# Patient Record
Sex: MALE | Race: WHITE | NOT HISPANIC OR LATINO | Employment: OTHER | URBAN - METROPOLITAN AREA
[De-identification: names, ages, dates, MRNs, and addresses within clinical notes are randomized per-mention and may not be internally consistent; named-entity substitution may affect disease eponyms.]

---

## 2018-10-17 ENCOUNTER — TELEPHONE (OUTPATIENT)
Dept: UROLOGY | Facility: AMBULATORY SURGERY CENTER | Age: 73
End: 2018-10-17

## 2018-10-17 NOTE — TELEPHONE ENCOUNTER
Reason for appointment/Complaint/Diagnosis : elevated PSA  10 8    Insurance: [employer]     History of Cancer? No      Previous urologist?     Dr Austin Fried               Records requested/where? In [facility]     [facility]     Location Preference for office visit?  [place]    Patient stated that he is going to drop off  results at [facility]

## 2018-10-17 NOTE — TELEPHONE ENCOUNTER
Spoke with patient  Patient reports he had been seeing Dr Baca Patient for a number of years  PSA has ranged up to 7-8 over the years  Patient reports he has never had a prostate biopsy done  Patient reports current PSA is 10 87  Patient reports he has a number of PSA results he will bring to the appointment  Patient scheduled for 10/18/18 at 1:15 in the [facility] office with Dr Juan Jose Blackburn, Address and location confirmed with patient  Patient to arrive approx 15 minutes early to complete new patient paperwork

## 2018-10-18 ENCOUNTER — TELEPHONE (OUTPATIENT)
Dept: UROLOGY | Facility: CLINIC | Age: 73
End: 2018-10-18

## 2018-10-18 ENCOUNTER — OFFICE VISIT (OUTPATIENT)
Dept: UROLOGY | Facility: CLINIC | Age: 73
End: 2018-10-18
Payer: MEDICARE

## 2018-10-18 VITALS
HEART RATE: 62 BPM | WEIGHT: 142 LBS | SYSTOLIC BLOOD PRESSURE: 118 MMHG | HEIGHT: 68 IN | BODY MASS INDEX: 21.52 KG/M2 | DIASTOLIC BLOOD PRESSURE: 68 MMHG

## 2018-10-18 DIAGNOSIS — R97.20 ELEVATED PSA: Primary | ICD-10-CM

## 2018-10-18 PROCEDURE — 99204 OFFICE O/P NEW MOD 45 MIN: CPT | Performed by: UROLOGY

## 2018-10-18 RX ORDER — CIPROFLOXACIN 500 MG/1
500 TABLET, FILM COATED ORAL EVERY 12 HOURS SCHEDULED
Qty: 6 TABLET | Refills: 0 | Status: SHIPPED | OUTPATIENT
Start: 2018-10-18 | End: 2018-10-21

## 2018-10-18 RX ORDER — TADALAFIL 5 MG/1
TABLET ORAL EVERY 24 HOURS
COMMUNITY
End: 2018-10-18

## 2018-10-18 RX ORDER — SILDENAFIL CITRATE 20 MG/1
TABLET ORAL
Qty: 90 TABLET | Refills: 0 | Status: SHIPPED | OUTPATIENT
Start: 2018-10-18 | End: 2019-02-26 | Stop reason: ALTCHOICE

## 2018-10-18 RX ORDER — LORATADINE 10 MG/1
TABLET ORAL
COMMUNITY
Start: 2010-01-19 | End: 2019-05-31 | Stop reason: ALTCHOICE

## 2018-10-18 RX ORDER — SIMVASTATIN 20 MG
20 TABLET ORAL
COMMUNITY
End: 2019-09-12 | Stop reason: ALTCHOICE

## 2018-10-18 RX ORDER — MODAFINIL 200 MG/1
TABLET ORAL
COMMUNITY
Start: 2018-05-01 | End: 2019-05-31 | Stop reason: DRUGHIGH

## 2018-10-18 RX ORDER — BIOTIN 1 MG
TABLET ORAL EVERY 24 HOURS
COMMUNITY
Start: 2017-02-17 | End: 2019-02-26 | Stop reason: ALTCHOICE

## 2018-10-18 RX ORDER — ATORVASTATIN CALCIUM 20 MG/1
20 TABLET, FILM COATED ORAL
COMMUNITY
End: 2019-02-26 | Stop reason: ALTCHOICE

## 2018-10-18 NOTE — LETTER
October 18, 2018     [facility] [id number]    Patient: Isela Barton   YOB: 1945   Date of Visit: 10/18/2018       Dear Dr Di Dean:    Thank you for referring Usha Levine to me for evaluation  Below are my notes for this consultation  If you have questions, please do not hesitate to call me  I look forward to following your patient along with you  Sincerely,        Christi William MD        CC: No Recipients  Christi William MD  10/18/2018  1:58 PM  Sign at close encounter  10/18/2018    Isela Barton  1945  [id number]        Assessment  Elevated PSA with prostate nodule    Plan  I am concerned about his PSA elevation, trend, and his prostate examination  We discussed this in detail  He understands that there is reasonable risk for prostate cancer  I recommend prostate biopsy  We discussed the technique, risks, benefits of typical 12 core biopsy  I may also biopsy additional cores from the suspicious areas  He is amenable to this  He had questions about treatment options  We briefly discussed typical treatment options, however understanding that this premature without any pathology  He also asked about treatment for erectile dysfunction  He has been using Cialis 5 mg generic from [facility]  I recommended generic sildenafil for medisuite  He would like to try this, as the cost is much less  Prescription was sent  All questions answered to his satisfaction  History of Present Illness  Lobo Hall is a 75 y o  male who presents for evaluation of elevated PSA  He had been seeing Dr Td Stratton for several years  Initially he was seen for erectile dysfunction, however prostate screening revealed elevated PSA  In 2014 PSA was 7 1  In 2016 PSA was 8 5, and current PSA from the [place] is 10 87  Prior to this year, these had been observed without biopsy or other intervention      His only complaint is nocturia with double voiding at times overnight  AUA symptom score is 8  He is a [place], but served on a [employer], rather than on land  He did not have Agent Orange exposure  His father had bladder cancer and underwent radical cystectomy in his [de-identified], and lived to age 80  AUA SYMPTOM SCORE      Most Recent Value   AUA SYMPTOM SCORE   How often have you had a sensation of not emptying your bladder completely after you finished urinating? 3   How often have you had to urinate again less than two hours after you finished urinating? 2   How often have you found you stopped and started again several times when you urinate? 1   How often have you found it difficult to postpone urination? 0   How often have you had a weak urinary stream?  0   How often have you had to push or strain to begin urination? 0   How many times did you most typically get up to urinate from the time you went to bed at night until the time you got up in the morning? 2   Quality of Life: If you were to spend the rest of your life with your urinary condition just the way it is now, how would you feel about that?  1   AUA SYMPTOM SCORE  8          Review of Systems  Review of Systems   Constitutional: Negative  HENT: Negative  Respiratory: Negative  Cardiovascular: Negative  Gastrointestinal: Negative  Genitourinary:        As per HPI   Musculoskeletal: Negative  Skin: Negative  Neurological: Negative  Hematological: Negative            Past Medical History  Past Medical History:   Diagnosis Date    Cancer ([facility] [address] )     skin    Elevated PSA     Narcolepsy     Pacemaker 1988       Past Social History  Past Surgical History:   Procedure Laterality Date    ATRIAL CARDIAC PACEMAKER INSERTION      CARDIAC CATHETERIZATION  02/12/2015    SKIN CANCER EXCISION      left cheek       Past Family History  Family History   Problem Relation Age of Onset    Coronary artery disease Mother     Coronary artery disease Father        Past Social history  Social History     Social History    Marital status: /Civil Union     Spouse name: N/A    Number of children: N/A    Years of education: N/A     Occupational History    Not on file  Social History Main Topics    Smoking status: Never Smoker    Smokeless tobacco: Never Used      Comment: No passive smoke exposure    Alcohol use Yes      Comment: 1 glass beer/wine daily    Drug use: No    Sexual activity: Not on file     Other Topics Concern    Not on file     Social History Narrative    No narrative on file     History   Smoking Status    Never Smoker   Smokeless Tobacco    Never Used     Comment: No passive smoke exposure       Current Medications  Current Outpatient Prescriptions   Medication Sig Dispense Refill    atorvastatin (LIPITOR) 20 mg tablet Take 20 mg by mouth      Cholecalciferol (VITAMIN D3) 1000 units CAPS every 24 hours      loratadine (CLARITIN) 10 mg tablet       modafinil (PROVIGIL) 200 MG tablet TAKE 1 TABLET BY MOUTH  TWICE A DAY      simvastatin (ZOCOR) 20 mg tablet Take 20 mg by mouth      ciprofloxacin (CIPRO) 500 mg tablet Take 1 tablet (500 mg total) by mouth every 12 (twelve) hours for 3 days Start day prior to procedure 6 tablet 0    sildenafil (REVATIO) 20 mg tablet Take 3 to 5 tablets as directed  Use no more than once daily  90 tablet 0     No current facility-administered medications for this visit  Allergies  No Known Allergies    Past Medical History, Social History, Family History, medications and allergies were reviewed  Vitals  Vitals:    10/18/18 1315   BP: 118/68   BP Location: Left arm   Patient Position: Sitting   Cuff Size: Standard   Pulse: 62   Weight: 64 4 kg (142 lb)   Height: 5' 7 5" (1 715 m)       Physical Exam  Physical Exam   Constitutional: He is oriented to person, place, and time  He appears well-developed and well-nourished  Cardiovascular: Normal rate  Pulmonary/Chest: Effort normal    Abdominal: Soft  Genitourinary:   Genitourinary Comments: Prostate about 35 g, nodule palpable at the right base, additional left-sided nodule left lateral as well  Musculoskeletal: Normal range of motion  Neurological: He is alert and oriented to person, place, and time  Skin: Skin is warm, dry and intact  Psychiatric: He has a normal mood and affect  Vitals reviewed          Results  No results found for: PSA  No results found for: GLUCOSE, CALCIUM, NA, K, CO2, CL, BUN, CREATININE  No results found for: WBC, HGB, HCT, MCV, PLT

## 2018-10-18 NOTE — TELEPHONE ENCOUNTER
Patient managed by Dr Reji Earl  Seen in [facility] office  Patient seen today by Dr Reji Earl, who advised prostate biopsy  Patient called and left message to schedule biopsy  Attempted to return phone call to schedule, left message for patient to return call to schedule

## 2018-10-18 NOTE — PROGRESS NOTES
10/18/2018    Dolly Hendricks  1945  [id number]        Assessment  Elevated PSA with prostate nodule    Plan  I am concerned about his PSA elevation, trend, and his prostate examination  We discussed this in detail  He understands that there is reasonable risk for prostate cancer  I recommend prostate biopsy  We discussed the technique, risks, benefits of typical 12 core biopsy  I may also biopsy additional cores from the suspicious areas  He is amenable to this  He had questions about treatment options  We briefly discussed typical treatment options, however understanding that this premature without any pathology  He also asked about treatment for erectile dysfunction  He has been using Cialis 5 mg generic from [facility]  I recommended generic sildenafil for medisuite  He would like to try this, as the cost is much less  Prescription was sent  All questions answered to his satisfaction  History of Present Illness  Della Daley is a 75 y o  male who presents for evaluation of elevated PSA  He had been seeing Dr Agusto Lake for several years  Initially he was seen for erectile dysfunction, however prostate screening revealed elevated PSA  In 2014 PSA was 7 1  In 2016 PSA was 8 5, and current PSA from the [facility] is 10 87  Prior to this year, these had been observed without biopsy or other intervention  His only complaint is nocturia with double voiding at times overnight  AUA symptom score is 8  He is a [place], but served on a [employer], rather than on land  He did not have Agent Orange exposure  His father had bladder cancer and underwent radical cystectomy in his [de-identified], and lived to age 80  AUA SYMPTOM SCORE      Most Recent Value   AUA SYMPTOM SCORE   How often have you had a sensation of not emptying your bladder completely after you finished urinating? 3   How often have you had to urinate again less than two hours after you finished urinating?   2   How often have you found you stopped and started again several times when you urinate? 1   How often have you found it difficult to postpone urination? 0   How often have you had a weak urinary stream?  0   How often have you had to push or strain to begin urination? 0   How many times did you most typically get up to urinate from the time you went to bed at night until the time you got up in the morning? 2   Quality of Life: If you were to spend the rest of your life with your urinary condition just the way it is now, how would you feel about that?  1   AUA SYMPTOM SCORE  8          Review of Systems  Review of Systems   Constitutional: Negative  HENT: Negative  Respiratory: Negative  Cardiovascular: Negative  Gastrointestinal: Negative  Genitourinary:        As per HPI   Musculoskeletal: Negative  Skin: Negative  Neurological: Negative  Hematological: Negative  Past Medical History  Past Medical History:   Diagnosis Date    Cancer ([facility] [address] )     skin    Elevated PSA     Narcolepsy     Pacemaker 1988       Past Social History  Past Surgical History:   Procedure Laterality Date    ATRIAL CARDIAC PACEMAKER INSERTION      CARDIAC CATHETERIZATION  02/12/2015    SKIN CANCER EXCISION      left cheek       Past Family History  Family History   Problem Relation Age of Onset    Coronary artery disease Mother     Coronary artery disease Father        Past Social history  Social History     Social History    Marital status: /Civil Union     Spouse name: N/A    Number of children: N/A    Years of education: N/A     Occupational History    Not on file       Social History Main Topics    Smoking status: Never Smoker    Smokeless tobacco: Never Used      Comment: No passive smoke exposure    Alcohol use Yes      Comment: 1 glass beer/wine daily    Drug use: No    Sexual activity: Not on file     Other Topics Concern    Not on file     Social History Narrative    No narrative on file History   Smoking Status    Never Smoker   Smokeless Tobacco    Never Used     Comment: No passive smoke exposure       Current Medications  Current Outpatient Prescriptions   Medication Sig Dispense Refill    atorvastatin (LIPITOR) 20 mg tablet Take 20 mg by mouth      Cholecalciferol (VITAMIN D3) 1000 units CAPS every 24 hours      loratadine (CLARITIN) 10 mg tablet       modafinil (PROVIGIL) 200 MG tablet TAKE 1 TABLET BY MOUTH  TWICE A DAY      simvastatin (ZOCOR) 20 mg tablet Take 20 mg by mouth      ciprofloxacin (CIPRO) 500 mg tablet Take 1 tablet (500 mg total) by mouth every 12 (twelve) hours for 3 days Start day prior to procedure 6 tablet 0    sildenafil (REVATIO) 20 mg tablet Take 3 to 5 tablets as directed  Use no more than once daily  90 tablet 0     No current facility-administered medications for this visit  Allergies  No Known Allergies    Past Medical History, Social History, Family History, medications and allergies were reviewed  Vitals  Vitals:    10/18/18 1315   BP: 118/68   BP Location: Left arm   Patient Position: Sitting   Cuff Size: Standard   Pulse: 62   Weight: 64 4 kg (142 lb)   Height: 5' 7 5" (1 715 m)       Physical Exam  Physical Exam   Constitutional: He is oriented to person, place, and time  He appears well-developed and well-nourished  Cardiovascular: Normal rate  Pulmonary/Chest: Effort normal    Abdominal: Soft  Genitourinary:   Genitourinary Comments: Prostate about 35 g, nodule palpable at the right base, additional left-sided nodule left lateral as well  Musculoskeletal: Normal range of motion  Neurological: He is alert and oriented to person, place, and time  Skin: Skin is warm, dry and intact  Psychiatric: He has a normal mood and affect  Vitals reviewed          Results  No results found for: PSA  No results found for: GLUCOSE, CALCIUM, NA, K, CO2, CL, BUN, CREATININE  No results found for: WBC, HGB, HCT, MCV, PLT

## 2018-10-19 NOTE — TELEPHONE ENCOUNTER
Pt called office to schedule biopsy, pt scheduled 11/5/18 in [place] for biopsy, and 11/21/18 in [place] for results  Pt aware both appts in [place], mailed appt cards directions and dirrections

## 2018-11-05 ENCOUNTER — PROCEDURE VISIT (OUTPATIENT)
Dept: UROLOGY | Facility: AMBULATORY SURGERY CENTER | Age: 73
End: 2018-11-05
Payer: MEDICARE

## 2018-11-05 VITALS
WEIGHT: 147.4 LBS | DIASTOLIC BLOOD PRESSURE: 78 MMHG | HEIGHT: 68 IN | SYSTOLIC BLOOD PRESSURE: 118 MMHG | BODY MASS INDEX: 22.34 KG/M2

## 2018-11-05 DIAGNOSIS — R97.20 ELEVATED PSA: Primary | ICD-10-CM

## 2018-11-05 PROCEDURE — 76872 US TRANSRECTAL: CPT | Performed by: UROLOGY

## 2018-11-05 PROCEDURE — G0416 PROSTATE BIOPSY, ANY MTHD: HCPCS | Performed by: PATHOLOGY

## 2018-11-05 PROCEDURE — 55700 PR BIOPSY OF PROSTATE,NEEDLE/PUNCH: CPT | Performed by: UROLOGY

## 2018-11-05 PROCEDURE — 76942 ECHO GUIDE FOR BIOPSY: CPT | Performed by: UROLOGY

## 2018-11-05 RX ORDER — TADALAFIL 5 MG/1
TABLET ORAL EVERY 24 HOURS
COMMUNITY
End: 2018-11-05 | Stop reason: CLARIF

## 2018-11-05 NOTE — PROGRESS NOTES
Prostate Biopsy note: The patient returns to the office today to undergo a transrectal ultrasound-guided biopsy of the prostate secondary to an elevated PSA  Risk and benefits of the procedure were discussed  Informed consent was obtained  The patient's prebiopsy preparation was deemed to be adequate  Antibiotics had been taken as prescribed  If appropriate blood thinners had been placed on hold  The patient completed an enema as prescribed  The patient was placed in the lateral decubitus position  Digital rectal examination was performed revealing a 30 gram prostate  Palpable nodule at the right base and left lateral portions  Viscous lidocaine jelly was instilled into the rectum  Transrectal ultrasonography was then performed  The prostate measured 25 grams  Hypoechoic area at the right base  5 cc of 2% lidocaine were then injected bilaterally between the junction of the base of the prostate and the seminal vesicles  Ultrasound guidance was utilized to place the needle into proper position for the administration of the local anesthetic  A standard 12 core biopsy was then performed  4 cores were taken from the right peripheral zone  2 cores were taken from the right central zone  4 cores were taken from the left peripheral zone  2 cores were taken from the left central zone  Overall the patient tolerated the procedure and there were no complications

## 2018-11-21 ENCOUNTER — OFFICE VISIT (OUTPATIENT)
Dept: UROLOGY | Facility: AMBULATORY SURGERY CENTER | Age: 73
End: 2018-11-21
Payer: MEDICARE

## 2018-11-21 VITALS
SYSTOLIC BLOOD PRESSURE: 116 MMHG | WEIGHT: 148 LBS | HEART RATE: 62 BPM | BODY MASS INDEX: 22.43 KG/M2 | HEIGHT: 68 IN | DIASTOLIC BLOOD PRESSURE: 64 MMHG

## 2018-11-21 DIAGNOSIS — C61 PROSTATE CANCER (HCC): Primary | ICD-10-CM

## 2018-11-21 PROCEDURE — 99215 OFFICE O/P EST HI 40 MIN: CPT | Performed by: UROLOGY

## 2018-11-21 NOTE — PROGRESS NOTES
11/21/2018    [facility]  1945  [id number]        Assessment  High volume [place] 6 prostate cancer    Plan  We had an extended discussion regarding the grading and staging of prostate cancer in detail  We discussed his particular situation with Hi 6 prostate cancer likely for several years, with extensive bilateral invasion  Nevertheless, we discussed that it is unlikely that Hi 6 prostate cancer will metastasize  We spent some time discussing options at this point  Explained that typically [place] prostate cancer is observed with active surveillance protocol and not treated  However, due to the degree of cancer identified, he certainly has the option of treatment  We discussed surgical treatment as well as radiation therapy  We both agree that surgery is not appropriate for him  However he is extremely healthy and fit, and may wish to pursue radiation therapy  He will review literature given to him and also online literature  Will also speak to family members or knowledgeable about this  He may wish to pursue Radiation Oncology consultation, but is not ready for this yet  For now will plan on PSA in 3 months with follow-up  If he changes his mind we may refer him for radiation oncology consultation  Otherwise will plan for now on active surveillance with confirmatory biopsy in 1 year or sooner depending on PSA  All questions answered to their satisfaction  Total visit time was 40 minutes of which over 50% was spent on counseling  History of Present Illness  Norah Benítez is a 75 y o  male returning for prostate biopsy results  He has no complaints regarding his prostate biopsy  This was tolerated well  Review of Systems  Review of Systems   Constitutional: Negative  HENT: Negative  Respiratory: Negative  Cardiovascular: Negative  Gastrointestinal: Negative  Genitourinary:        As per HPI   Musculoskeletal: Negative  Skin: Negative  Neurological: Negative  Hematological: Negative  Past Medical History  Past Medical History:   Diagnosis Date    Cancer ([address] )     skin    Elevated PSA     Narcolepsy     Pacemaker 1988       Past Social History  Past Surgical History:   Procedure Laterality Date    ATRIAL CARDIAC PACEMAKER INSERTION      CARDIAC CATHETERIZATION  02/12/2015    SKIN CANCER EXCISION      left cheek       Past Family History  Family History   Problem Relation Age of Onset    Coronary artery disease Mother     Coronary artery disease Father        Past Social history  Social History     Social History    Marital status: /Civil Union     Spouse name: N/A    Number of children: N/A    Years of education: N/A     Occupational History    Not on file  Social History Main Topics    Smoking status: Never Smoker    Smokeless tobacco: Never Used      Comment: No passive smoke exposure    Alcohol use Yes      Comment: 1 glass beer/wine daily    Drug use: No    Sexual activity: Not on file     Other Topics Concern    Not on file     Social History Narrative    No narrative on file     History   Smoking Status    Never Smoker   Smokeless Tobacco    Never Used     Comment: No passive smoke exposure       Current Medications  Current Outpatient Prescriptions   Medication Sig Dispense Refill    atorvastatin (LIPITOR) 20 mg tablet Take 20 mg by mouth      Cholecalciferol (VITAMIN D3) 1000 units CAPS every 24 hours      loratadine (CLARITIN) 10 mg tablet       modafinil (PROVIGIL) 200 MG tablet TAKE 1 TABLET BY MOUTH  TWICE A DAY      sildenafil (REVATIO) 20 mg tablet Take 3 to 5 tablets as directed  Use no more than once daily  90 tablet 0    simvastatin (ZOCOR) 20 mg tablet Take 20 mg by mouth       No current facility-administered medications for this visit          Allergies  No Known Allergies    Past Medical History, Social History, Family History, medications and allergies were reviewed  Vitals  Vitals:    11/21/18 1550   BP: 116/64   BP Location: Left arm   Patient Position: Sitting   Cuff Size: Adult   Pulse: 62   Weight: 67 1 kg (148 lb)   Height: 5' 7 5" (1 715 m)       Physical Exam  Physical Exam   Constitutional: He is oriented to person, place, and time  He appears well-developed and well-nourished  Cardiovascular: Normal rate  Pulmonary/Chest: Effort normal    Abdominal: Soft  Musculoskeletal: Normal range of motion  Neurological: He is alert and oriented to person, place, and time  Skin: Skin is warm, dry and intact  Psychiatric: He has a normal mood and affect  Vitals reviewed          Results  No results found for: PSA  No results found for: GLUCOSE, CALCIUM, NA, K, CO2, CL, BUN, CREATININE  No results found for: WBC, HGB, HCT, MCV, PLT

## 2018-11-21 NOTE — LETTER
November 21, 2018     [facility] [id number]    Patient: Valentino Fordyce   YOB: 1945   Date of Visit: 11/21/2018       Dear Dr Banegas Post:    Thank you for referring Ledale Dougherty to me for evaluation  Below are my notes for this consultation  If you have questions, please do not hesitate to call me  I look forward to following your patient along with you  Sincerely,        Aisha Gil MD        CC: No Recipients  Aisha Gil MD  11/21/2018  4:25 PM  Sign at close encounter  11/21/2018    Valentino Fordyce  1945  [id number]        Assessment  High volume [place] 6 prostate cancer    Plan  We had an extended discussion regarding the grading and staging of prostate cancer in detail  We discussed his particular situation with Hi 6 prostate cancer likely for several years, with extensive bilateral invasion  Nevertheless, we discussed that it is unlikely that [place] 6 prostate cancer will metastasize  We spent some time discussing options at this point  Explained that typically Hi 6 prostate cancer is observed with active surveillance protocol and not treated  However, due to the degree of cancer identified, he certainly has the option of treatment  We discussed surgical treatment as well as radiation therapy  We both agree that surgery is not appropriate for him  However he is extremely healthy and fit, and may wish to pursue radiation therapy  He will review literature given to him and also online literature  Will also speak to family members or knowledgeable about this  He may wish to pursue Radiation Oncology consultation, but is not ready for this yet  For now will plan on PSA in 3 months with follow-up  If he changes his mind we may refer him for radiation oncology consultation  Otherwise will plan for now on active surveillance with confirmatory biopsy in 1 year or sooner depending on PSA    All questions answered to their satisfaction  Total visit time was 40 minutes of which over 50% was spent on counseling  History of Present Illness  Linda Cole is a 75 y o  male returning for prostate biopsy results  He has no complaints regarding his prostate biopsy  This was tolerated well  Review of Systems  Review of Systems   Constitutional: Negative  HENT: Negative  Respiratory: Negative  Cardiovascular: Negative  Gastrointestinal: Negative  Genitourinary:        As per HPI   Musculoskeletal: Negative  Skin: Negative  Neurological: Negative  Hematological: Negative  Past Medical History  Past Medical History:   Diagnosis Date    Cancer ([facility] [address] )     skin    Elevated PSA     Narcolepsy     Pacemaker 1988       Past Social History  Past Surgical History:   Procedure Laterality Date    ATRIAL CARDIAC PACEMAKER INSERTION      CARDIAC CATHETERIZATION  02/12/2015    SKIN CANCER EXCISION      left cheek       Past Family History  Family History   Problem Relation Age of Onset    Coronary artery disease Mother     Coronary artery disease Father        Past Social history  Social History     Social History    Marital status: /Civil Union     Spouse name: N/A    Number of children: N/A    Years of education: N/A     Occupational History    Not on file       Social History Main Topics    Smoking status: Never Smoker    Smokeless tobacco: Never Used      Comment: No passive smoke exposure    Alcohol use Yes      Comment: 1 glass beer/wine daily    Drug use: No    Sexual activity: Not on file     Other Topics Concern    Not on file     Social History Narrative    No narrative on file     History   Smoking Status    Never Smoker   Smokeless Tobacco    Never Used     Comment: No passive smoke exposure       Current Medications  Current Outpatient Prescriptions   Medication Sig Dispense Refill    atorvastatin (LIPITOR) 20 mg tablet Take 20 mg by mouth      Cholecalciferol (VITAMIN D3) 1000 units CAPS every 24 hours      loratadine (CLARITIN) 10 mg tablet       modafinil (PROVIGIL) 200 MG tablet TAKE 1 TABLET BY MOUTH  TWICE A DAY      sildenafil (REVATIO) 20 mg tablet Take 3 to 5 tablets as directed  Use no more than once daily  90 tablet 0    simvastatin (ZOCOR) 20 mg tablet Take 20 mg by mouth       No current facility-administered medications for this visit  Allergies  No Known Allergies    Past Medical History, Social History, Family History, medications and allergies were reviewed  Vitals  Vitals:    11/21/18 1550   BP: 116/64   BP Location: Left arm   Patient Position: Sitting   Cuff Size: Adult   Pulse: 62   Weight: 67 1 kg (148 lb)   Height: 5' 7 5" (1 715 m)       Physical Exam  Physical Exam   Constitutional: He is oriented to person, place, and time  He appears well-developed and well-nourished  Cardiovascular: Normal rate  Pulmonary/Chest: Effort normal    Abdominal: Soft  Musculoskeletal: Normal range of motion  Neurological: He is alert and oriented to person, place, and time  Skin: Skin is warm, dry and intact  Psychiatric: He has a normal mood and affect  Vitals reviewed          Results  No results found for: PSA  No results found for: GLUCOSE, CALCIUM, NA, K, CO2, CL, BUN, CREATININE  No results found for: WBC, HGB, HCT, MCV, PLT

## 2019-01-30 ENCOUNTER — TELEPHONE (OUTPATIENT)
Dept: CARDIOLOGY CLINIC | Facility: CLINIC | Age: 74
End: 2019-01-30

## 2019-01-30 NOTE — TELEPHONE ENCOUNTER
RECORDS CAME TO [facility] OFFICE ON 1/30/19 FOR AN UPCOMING  APPT BUT IT IS SCHEDULED WITH DR GARCIA AT [facility] OFFICE, SO I SENT THEM INTEROFFICE MAIL TO [place] ON 1/30/19 AT 11:55 PM   Deepak ACKERMAN MA, [facility]

## 2019-02-26 ENCOUNTER — OFFICE VISIT (OUTPATIENT)
Dept: CARDIOLOGY CLINIC | Facility: CLINIC | Age: 74
End: 2019-02-26
Payer: MEDICARE

## 2019-02-26 VITALS
SYSTOLIC BLOOD PRESSURE: 140 MMHG | WEIGHT: 151.3 LBS | HEART RATE: 47 BPM | HEIGHT: 68 IN | BODY MASS INDEX: 22.93 KG/M2 | DIASTOLIC BLOOD PRESSURE: 70 MMHG

## 2019-02-26 DIAGNOSIS — E78.00 PURE HYPERCHOLESTEROLEMIA: ICD-10-CM

## 2019-02-26 DIAGNOSIS — Z95.0 PACEMAKER: Primary | ICD-10-CM

## 2019-02-26 PROCEDURE — 99204 OFFICE O/P NEW MOD 45 MIN: CPT | Performed by: INTERNAL MEDICINE

## 2019-02-26 RX ORDER — TADALAFIL 5 MG/1
5 TABLET ORAL DAILY PRN
COMMUNITY
End: 2019-06-11 | Stop reason: DRUGHIGH

## 2019-03-01 ENCOUNTER — HOSPITAL ENCOUNTER (OUTPATIENT)
Dept: RADIOLOGY | Facility: HOSPITAL | Age: 74
Discharge: HOME | End: 2019-03-01
Attending: INTERNAL MEDICINE
Payer: MEDICARE

## 2019-03-01 DIAGNOSIS — Z95.0 PACEMAKER: ICD-10-CM

## 2019-03-01 PROCEDURE — 71046 X-RAY EXAM CHEST 2 VIEWS: CPT

## 2019-03-06 ENCOUNTER — TELEPHONE (OUTPATIENT)
Dept: CARDIOLOGY CLINIC | Facility: CLINIC | Age: 74
End: 2019-03-06

## 2019-03-06 ENCOUNTER — TELEPHONE (OUTPATIENT)
Dept: UROLOGY | Facility: AMBULATORY SURGERY CENTER | Age: 74
End: 2019-03-06

## 2019-03-06 NOTE — TELEPHONE ENCOUNTER
Patient received message to r/s his appointment with Dr Espinoza Sees he would like [address] [facility]ure[facility] Ontiveros or Gopi as soon as possible can't do 03/07

## 2019-03-06 NOTE — TELEPHONE ENCOUNTER
P/C from radiology about CXR results from 3/1/2019, ordered by DT     "CHEST      INDICATION:   Z95 0: Presence of cardiac pacemaker      COMPARISON:  None     EXAM PERFORMED/VIEWS:  XR CHEST PA & LATERAL  Images: 5     FINDINGS:  Right-sided multilead cardiac pacer  One of the leads exhibits 2 regions of apparent proximal segmental interruption      Cardiomediastinal silhouette appears unremarkable      The lungs are clear    No pneumothorax or pleural effusion      Osseous structures appear within normal limits for patient age      IMPRESSION:  Cardiac pacer with suspected lead interruptions     No acute cardiopulmonary disease "     I spoke with Andree Mcguire in device clinic-no problems at his device check at [address] [place] [address] with DT on 2/26/19; CXR done 3/1/19

## 2019-03-07 ENCOUNTER — OFFICE VISIT (OUTPATIENT)
Dept: UROLOGY | Facility: CLINIC | Age: 74
End: 2019-03-07
Payer: MEDICARE

## 2019-03-07 VITALS
SYSTOLIC BLOOD PRESSURE: 124 MMHG | HEART RATE: 65 BPM | HEIGHT: 68 IN | BODY MASS INDEX: 22.97 KG/M2 | WEIGHT: 151.6 LBS | DIASTOLIC BLOOD PRESSURE: 62 MMHG

## 2019-03-07 DIAGNOSIS — C61 PROSTATE CANCER (HCC): Primary | ICD-10-CM

## 2019-03-07 PROCEDURE — 99213 OFFICE O/P EST LOW 20 MIN: CPT | Performed by: UROLOGY

## 2019-03-07 NOTE — PROGRESS NOTES
3/7/2019    Kristina Lewis  1945  [id number]        Assessment  High volume [place] 6 prostate cancer    Plan  Once again, we discussed continued active surveillance versus treatment with radiation therapy  He is comfortable with surveillance as he is not anxious at this time  Agrees with another PSA and examination in 3 months and confirmatory biopsy in 6 months around September  Discussed potential risks, benefits, complications of radiation therapy if he chooses  All questions answered to satisfaction  History of Present Illness  Candace Hoffmann is a 75 y o  male  of [facility] however no Agent [place] exposure  He was found to have prostate cancer, [place] 6 based on PSA of 10 87 and examination indicating bilateral nodules  We have decided to proceed with active surveillance, however he has still been considering radiation therapy  No current complaints  PSA 10 5        AUA SYMPTOM SCORE      Most Recent Value   AUA SYMPTOM SCORE   How often have you had a sensation of not emptying your bladder completely after you finished urinating? 5   How often have you had to urinate again less than two hours after you finished urinating? 1   How often have you found you stopped and started again several times when you urinate? 1   How often have you found it difficult to postpone urination? 0   How often have you had a weak urinary stream?  1   How often have you had to push or strain to begin urination? 0   How many times did you most typically get up to urinate from the time you went to bed at night until the time you got up in the morning? 2   Quality of Life: If you were to spend the rest of your life with your urinary condition just the way it is now, how would you feel about that?  1   AUA SYMPTOM SCORE  10          Review of Systems  Review of Systems   Constitutional: Negative  HENT: Negative  Respiratory: Negative  Cardiovascular: Negative  Gastrointestinal: Negative  Genitourinary:        As per HPI   Musculoskeletal: Negative  Skin: Negative  Neurological: Negative  Hematological: Negative            Past Medical History  Past Medical History:   Diagnosis Date    Cancer ([facility] [address] )     skin    Elevated PSA     Narcolepsy     Pacemaker 1988       Past Social History  Past Surgical History:   Procedure Laterality Date    ATRIAL CARDIAC PACEMAKER INSERTION      CARDIAC CATHETERIZATION  02/12/2015    SKIN CANCER EXCISION      left cheek       Past Family History  Family History   Problem Relation Age of Onset    Coronary artery disease Mother     Coronary artery disease Father        Past Social history  Social History     Socioeconomic History    Marital status: /Civil Union     Spouse name: Not on file    Number of children: Not on file    Years of education: Not on file    Highest education level: Not on file   Occupational History    Not on file   Social Needs    Financial resource strain: Not on file    Food insecurity:     Worry: Not on file     Inability: Not on file    Transportation needs:     Medical: Not on file     Non-medical: Not on file   Tobacco Use    Smoking status: Never Smoker    Smokeless tobacco: Never Used    Tobacco comment: No passive smoke exposure   Substance and Sexual Activity    Alcohol use: Yes     Comment: 1 glass beer/wine daily    Drug use: No    Sexual activity: Not on file   Lifestyle    Physical activity:     Days per week: Not on file     Minutes per session: Not on file    Stress: Not on file   Relationships    Social connections:     Talks on phone: Not on file     Gets together: Not on file     Attends Restoration service: Not on file     Active member of club or organization: Not on file     Attends meetings of clubs or organizations: Not on file     Relationship status: Not on file    Intimate partner violence:     Fear of current or ex partner: Not on file     Emotionally abused: Not on file Physically abused: Not on file     Forced sexual activity: Not on file   Other Topics Concern    Not on file   Social History Narrative    Not on file     Social History     Tobacco Use   Smoking Status Never Smoker   Smokeless Tobacco Never Used   Tobacco Comment    No passive smoke exposure       Current Medications  Current Outpatient Medications   Medication Sig Dispense Refill    loratadine (CLARITIN) 10 mg tablet       modafinil (PROVIGIL) 200 MG tablet TAKE 1 TABLET BY MOUTH  TWICE A DAY      simvastatin (ZOCOR) 20 mg tablet Take 20 mg by mouth      tadalafil (CIALIS) 5 MG tablet Take 5 mg by mouth daily as needed for erectile dysfunction       No current facility-administered medications for this visit  Allergies  No Known Allergies    Past Medical History, Social History, Family History, medications and allergies were reviewed  Vitals  Vitals:    03/07/19 1432   BP: 124/62   BP Location: Left arm   Patient Position: Sitting   Cuff Size: Adult   Pulse: 65   Weight: 68 8 kg (151 lb 9 6 oz)   Height: 5' 7 5" (1 715 m)       Physical Exam  Physical Exam   Constitutional: He is oriented to person, place, and time  He appears well-developed and well-nourished  Cardiovascular: Normal rate  Pulmonary/Chest: Effort normal    Abdominal: Soft  Genitourinary:   Genitourinary Comments: Prostate about 30 g, nodule at the right base as well as left  Musculoskeletal: Normal range of motion  Neurological: He is alert and oriented to person, place, and time  Skin: Skin is warm, dry and intact  Psychiatric: He has a normal mood and affect  Vitals reviewed          Results  No results found for: PSA  No results found for: GLUCOSE, CALCIUM, NA, K, CO2, CL, BUN, CREATININE  No results found for: WBC, HGB, HCT, MCV, PLT

## 2019-04-25 ENCOUNTER — TELEPHONE (OUTPATIENT)
Dept: UROLOGY | Facility: MEDICAL CENTER | Age: 74
End: 2019-04-25

## 2019-04-25 DIAGNOSIS — N50.82 SCROTAL PAIN: Primary | ICD-10-CM

## 2019-04-29 ENCOUNTER — HOSPITAL ENCOUNTER (OUTPATIENT)
Dept: ULTRASOUND IMAGING | Facility: HOSPITAL | Age: 74
Discharge: HOME | End: 2019-04-29
Payer: MEDICARE

## 2019-04-29 DIAGNOSIS — N50.82 SCROTAL PAIN: ICD-10-CM

## 2019-04-29 PROCEDURE — 76870 US EXAM SCROTUM: CPT

## 2019-05-02 ENCOUNTER — OFFICE VISIT (OUTPATIENT)
Dept: UROLOGY | Facility: CLINIC | Age: 74
End: 2019-05-02
Payer: MEDICARE

## 2019-05-02 VITALS
HEART RATE: 64 BPM | DIASTOLIC BLOOD PRESSURE: 60 MMHG | WEIGHT: 149 LBS | BODY MASS INDEX: 22.58 KG/M2 | SYSTOLIC BLOOD PRESSURE: 118 MMHG | HEIGHT: 68 IN

## 2019-05-02 DIAGNOSIS — C61 PROSTATE CANCER (HCC): Primary | ICD-10-CM

## 2019-05-02 PROCEDURE — 99214 OFFICE O/P EST MOD 30 MIN: CPT | Performed by: UROLOGY

## 2019-05-02 RX ORDER — CIPROFLOXACIN 500 MG/1
500 TABLET, FILM COATED ORAL EVERY 12 HOURS SCHEDULED
Qty: 6 TABLET | Refills: 0 | Status: SHIPPED | OUTPATIENT
Start: 2019-05-02 | End: 2019-05-05

## 2019-05-03 PROCEDURE — 93000 ELECTROCARDIOGRAM COMPLETE: CPT | Performed by: INTERNAL MEDICINE

## 2019-05-20 ENCOUNTER — LAB (OUTPATIENT)
Dept: LAB | Facility: HOSPITAL | Age: 74
End: 2019-05-20
Attending: INTERNAL MEDICINE
Payer: MEDICARE

## 2019-05-20 ENCOUNTER — TRANSCRIBE ORDERS (OUTPATIENT)
Dept: LAB | Facility: HOSPITAL | Age: 74
End: 2019-05-20

## 2019-05-20 DIAGNOSIS — E78.00 PURE HYPERCHOLESTEROLEMIA: Primary | ICD-10-CM

## 2019-05-20 DIAGNOSIS — E78.00 PURE HYPERCHOLESTEROLEMIA: ICD-10-CM

## 2019-05-20 LAB
ALBUMIN SERPL BCP-MCNC: 3.7 G/DL (ref 3.5–5)
ALP SERPL-CCNC: 61 U/L (ref 46–116)
ALT SERPL W P-5'-P-CCNC: 27 U/L (ref 12–78)
ANION GAP SERPL CALCULATED.3IONS-SCNC: 5 MMOL/L (ref 4–13)
AST SERPL W P-5'-P-CCNC: 29 U/L (ref 5–45)
BASOPHILS # BLD AUTO: 0.05 THOUSANDS/ΜL (ref 0–0.1)
BASOPHILS NFR BLD AUTO: 1 % (ref 0–1)
BILIRUB DIRECT SERPL-MCNC: 0.12 MG/DL (ref 0–0.2)
BILIRUB SERPL-MCNC: 0.41 MG/DL (ref 0.2–1)
BUN SERPL-MCNC: 15 MG/DL (ref 5–25)
CALCIUM SERPL-MCNC: 9 MG/DL (ref 8.3–10.1)
CHLORIDE SERPL-SCNC: 105 MMOL/L (ref 100–108)
CHOLEST SERPL-MCNC: 191 MG/DL (ref 50–200)
CO2 SERPL-SCNC: 30 MMOL/L (ref 21–32)
CREAT SERPL-MCNC: 0.9 MG/DL (ref 0.6–1.3)
EOSINOPHIL # BLD AUTO: 0.2 THOUSAND/ΜL (ref 0–0.61)
EOSINOPHIL NFR BLD AUTO: 4 % (ref 0–6)
ERYTHROCYTE [DISTWIDTH] IN BLOOD BY AUTOMATED COUNT: 12.4 % (ref 11.6–15.1)
GFR SERPL CREATININE-BSD FRML MDRD: 84 ML/MIN/1.73SQ M
GLUCOSE P FAST SERPL-MCNC: 97 MG/DL (ref 65–99)
HCT VFR BLD AUTO: 43.6 % (ref 36.5–49.3)
HDLC SERPL-MCNC: 58 MG/DL (ref 40–60)
HGB BLD-MCNC: 14.3 G/DL (ref 12–17)
IMM GRANULOCYTES # BLD AUTO: 0.03 THOUSAND/UL (ref 0–0.2)
IMM GRANULOCYTES NFR BLD AUTO: 1 % (ref 0–2)
LDLC SERPL CALC-MCNC: 118 MG/DL (ref 0–100)
LYMPHOCYTES # BLD AUTO: 0.89 THOUSANDS/ΜL (ref 0.6–4.47)
LYMPHOCYTES NFR BLD AUTO: 16 % (ref 14–44)
MCH RBC QN AUTO: 32.2 PG (ref 26.8–34.3)
MCHC RBC AUTO-ENTMCNC: 32.8 G/DL (ref 31.4–37.4)
MCV RBC AUTO: 98 FL (ref 82–98)
MONOCYTES # BLD AUTO: 0.58 THOUSAND/ΜL (ref 0.17–1.22)
MONOCYTES NFR BLD AUTO: 10 % (ref 4–12)
NEUTROPHILS # BLD AUTO: 3.81 THOUSANDS/ΜL (ref 1.85–7.62)
NEUTS SEG NFR BLD AUTO: 68 % (ref 43–75)
NONHDLC SERPL-MCNC: 133 MG/DL
NRBC BLD AUTO-RTO: 0 /100 WBCS
PLATELET # BLD AUTO: 173 THOUSANDS/UL (ref 149–390)
PMV BLD AUTO: 10.3 FL (ref 8.9–12.7)
POTASSIUM SERPL-SCNC: 4.2 MMOL/L (ref 3.5–5.3)
PROT SERPL-MCNC: 6.8 G/DL (ref 6.4–8.2)
RBC # BLD AUTO: 4.44 MILLION/UL (ref 3.88–5.62)
SODIUM SERPL-SCNC: 140 MMOL/L (ref 136–145)
TRIGL SERPL-MCNC: 75 MG/DL
TSH SERPL DL<=0.05 MIU/L-ACNC: 1.93 UIU/ML (ref 0.36–3.74)
WBC # BLD AUTO: 5.56 THOUSAND/UL (ref 4.31–10.16)

## 2019-05-20 PROCEDURE — 84443 ASSAY THYROID STIM HORMONE: CPT | Performed by: INTERNAL MEDICINE

## 2019-05-20 PROCEDURE — 80061 LIPID PANEL: CPT

## 2019-05-20 PROCEDURE — 80048 BASIC METABOLIC PNL TOTAL CA: CPT | Performed by: INTERNAL MEDICINE

## 2019-05-20 PROCEDURE — 80076 HEPATIC FUNCTION PANEL: CPT | Performed by: INTERNAL MEDICINE

## 2019-05-20 PROCEDURE — 36415 COLL VENOUS BLD VENIPUNCTURE: CPT | Performed by: INTERNAL MEDICINE

## 2019-05-20 PROCEDURE — 85025 COMPLETE CBC W/AUTO DIFF WBC: CPT | Performed by: INTERNAL MEDICINE

## 2019-05-31 ENCOUNTER — OFFICE VISIT (OUTPATIENT)
Dept: FAMILY MEDICINE CLINIC | Facility: CLINIC | Age: 74
End: 2019-05-31
Payer: MEDICARE

## 2019-05-31 ENCOUNTER — TELEPHONE (OUTPATIENT)
Dept: FAMILY MEDICINE CLINIC | Facility: CLINIC | Age: 74
End: 2019-05-31

## 2019-05-31 VITALS
BODY MASS INDEX: 22.69 KG/M2 | WEIGHT: 149.7 LBS | HEIGHT: 68 IN | HEART RATE: 55 BPM | SYSTOLIC BLOOD PRESSURE: 126 MMHG | DIASTOLIC BLOOD PRESSURE: 80 MMHG | TEMPERATURE: 98 F | RESPIRATION RATE: 18 BRPM | OXYGEN SATURATION: 97 %

## 2019-05-31 DIAGNOSIS — N50.819 TESTICLE PAIN: Primary | ICD-10-CM

## 2019-05-31 PROCEDURE — 99213 OFFICE O/P EST LOW 20 MIN: CPT | Performed by: FAMILY MEDICINE

## 2019-05-31 RX ORDER — MODAFINIL 200 MG/1
200 TABLET ORAL DAILY
COMMUNITY

## 2019-05-31 RX ORDER — MELOXICAM 15 MG/1
15 TABLET ORAL DAILY
Qty: 10 TABLET | Refills: 2 | Status: SHIPPED | OUTPATIENT
Start: 2019-05-31 | End: 2019-06-07 | Stop reason: ALTCHOICE

## 2019-06-03 ENCOUNTER — HOSPITAL ENCOUNTER (OUTPATIENT)
Dept: ULTRASOUND IMAGING | Facility: HOSPITAL | Age: 74
Discharge: HOME | End: 2019-06-03
Payer: MEDICARE

## 2019-06-03 ENCOUNTER — APPOINTMENT (OUTPATIENT)
Dept: LAB | Facility: HOSPITAL | Age: 74
End: 2019-06-03
Attending: UROLOGY
Payer: MEDICARE

## 2019-06-03 DIAGNOSIS — C61 PROSTATE CANCER (HCC): ICD-10-CM

## 2019-06-03 DIAGNOSIS — N50.819 TESTICLE PAIN: ICD-10-CM

## 2019-06-03 LAB — PSA SERPL-MCNC: 10.5 NG/ML (ref 0–4)

## 2019-06-03 PROCEDURE — 84153 ASSAY OF PSA TOTAL: CPT

## 2019-06-03 PROCEDURE — 51798 US URINE CAPACITY MEASURE: CPT

## 2019-06-06 ENCOUNTER — IN-CLINIC DEVICE VISIT (OUTPATIENT)
Dept: CARDIOLOGY CLINIC | Facility: CLINIC | Age: 74
End: 2019-06-06
Payer: MEDICARE

## 2019-06-06 DIAGNOSIS — I49.5 SSS (SICK SINUS SYNDROME) (HCC): Primary | ICD-10-CM

## 2019-06-06 DIAGNOSIS — Z95.0 PRESENCE OF CARDIAC PACEMAKER: ICD-10-CM

## 2019-06-06 PROCEDURE — 93280 PM DEVICE PROGR EVAL DUAL: CPT | Performed by: INTERNAL MEDICINE

## 2019-06-07 ENCOUNTER — HOSPITAL ENCOUNTER (EMERGENCY)
Facility: HOSPITAL | Age: 74
Discharge: HOME | End: 2019-06-07
Admitting: EMERGENCY MEDICINE
Payer: MEDICARE

## 2019-06-07 ENCOUNTER — APPOINTMENT (EMERGENCY)
Dept: RADIOLOGY | Facility: HOSPITAL | Age: 74
End: 2019-06-07
Payer: MEDICARE

## 2019-06-07 VITALS
TEMPERATURE: 97.4 F | DIASTOLIC BLOOD PRESSURE: 69 MMHG | HEART RATE: 52 BPM | OXYGEN SATURATION: 100 % | RESPIRATION RATE: 16 BRPM | BODY MASS INDEX: 22.29 KG/M2 | WEIGHT: 146.61 LBS | SYSTOLIC BLOOD PRESSURE: 160 MMHG

## 2019-06-07 DIAGNOSIS — T82.9XXA AICD PROBLEM: ICD-10-CM

## 2019-06-07 DIAGNOSIS — Z95.810 AICD PROBLEM: ICD-10-CM

## 2019-06-07 DIAGNOSIS — R25.3 FASCICULATIONS OF MUSCLE: Primary | ICD-10-CM

## 2019-06-07 PROCEDURE — 99284 EMERGENCY DEPT VISIT MOD MDM: CPT

## 2019-06-07 PROCEDURE — 93005 ELECTROCARDIOGRAM TRACING: CPT

## 2019-06-07 PROCEDURE — 99282 EMERGENCY DEPT VISIT SF MDM: CPT | Performed by: EMERGENCY MEDICINE

## 2019-06-07 PROCEDURE — 71046 X-RAY EXAM CHEST 2 VIEWS: CPT

## 2019-06-09 LAB
ATRIAL RATE: 50 BPM
P AXIS: 80 DEGREES
PR INTERVAL: 180 MS
QRS AXIS: 86 DEGREES
QRSD INTERVAL: 84 MS
QT INTERVAL: 460 MS
QTC INTERVAL: 419 MS
T WAVE AXIS: 49 DEGREES
VENTRICULAR RATE: 50 BPM

## 2019-06-09 PROCEDURE — 93010 ELECTROCARDIOGRAM REPORT: CPT | Performed by: INTERNAL MEDICINE

## 2019-06-10 DIAGNOSIS — N52.9 ERECTILE DYSFUNCTION, UNSPECIFIED ERECTILE DYSFUNCTION TYPE: Primary | ICD-10-CM

## 2019-06-11 ENCOUNTER — TELEPHONE (OUTPATIENT)
Dept: FAMILY MEDICINE CLINIC | Facility: CLINIC | Age: 74
End: 2019-06-11

## 2019-06-12 RX ORDER — SILDENAFIL CITRATE 20 MG/1
TABLET ORAL
Qty: 90 TABLET | Refills: 3 | Status: SHIPPED | OUTPATIENT
Start: 2019-06-12 | End: 2021-03-03

## 2019-09-09 NOTE — TELEPHONE ENCOUNTER
Received fax from [facility] requesting Atorvastation  Med list shows Simvastatin along w/ Dr Blanco Graf note  Left message for Naeem Araujo to c/b office to clarify

## 2019-09-11 ENCOUNTER — TELEPHONE (OUTPATIENT)
Dept: CARDIOLOGY CLINIC | Facility: CLINIC | Age: 74
End: 2019-09-11

## 2019-09-11 DIAGNOSIS — I49.5 SSS (SICK SINUS SYNDROME) (HCC): ICD-10-CM

## 2019-09-11 DIAGNOSIS — E78.00 PURE HYPERCHOLESTEROLEMIA: Primary | ICD-10-CM

## 2019-09-11 NOTE — TELEPHONE ENCOUNTER
Patient called in to clarify his medication He is talking Atorvastatin 20 mg Is this ok to make change?

## 2019-09-11 NOTE — TELEPHONE ENCOUNTER
Dr Eusebio Paulson, do you want to address this about the patient's lipid med,  or do you want to defer to his PCP? Thank you

## 2019-09-12 ENCOUNTER — PROCEDURE VISIT (OUTPATIENT)
Dept: UROLOGY | Facility: CLINIC | Age: 74
End: 2019-09-12
Payer: MEDICARE

## 2019-09-12 VITALS
HEART RATE: 53 BPM | WEIGHT: 149 LBS | DIASTOLIC BLOOD PRESSURE: 80 MMHG | SYSTOLIC BLOOD PRESSURE: 124 MMHG | BODY MASS INDEX: 22.58 KG/M2 | HEIGHT: 68 IN

## 2019-09-12 DIAGNOSIS — E78.5 HYPERLIPIDEMIA: Primary | ICD-10-CM

## 2019-09-12 DIAGNOSIS — C61 PROSTATE CANCER (HCC): Primary | ICD-10-CM

## 2019-09-12 PROCEDURE — G0416 PROSTATE BIOPSY, ANY MTHD: HCPCS | Performed by: PATHOLOGY

## 2019-09-12 PROCEDURE — 76942 ECHO GUIDE FOR BIOPSY: CPT | Performed by: UROLOGY

## 2019-09-12 PROCEDURE — 76872 US TRANSRECTAL: CPT | Performed by: UROLOGY

## 2019-09-12 PROCEDURE — 55700 PR BIOPSY OF PROSTATE,NEEDLE/PUNCH: CPT | Performed by: UROLOGY

## 2019-09-12 RX ORDER — ATORVASTATIN CALCIUM 20 MG/1
20 TABLET, FILM COATED ORAL DAILY
Qty: 90 TABLET | Refills: 3 | Status: SHIPPED | OUTPATIENT
Start: 2019-09-12 | End: 2019-09-26 | Stop reason: SDUPTHER

## 2019-09-12 NOTE — TELEPHONE ENCOUNTER
Pt called back-says he spoke to Dr Bernie Blank already  He will be taking the atorvastatin 20 mg daily  I will be sure to mail the lab slip to the patient to have lipids and LFT's in 3 months  Pt is aware

## 2019-09-12 NOTE — PROGRESS NOTES
Prostate Biopsy note: The patient returns to the office today to undergo a transrectal ultrasound-guided biopsy of the prostate secondary to active surveillance Hi 6 prostate cancer, high-volume  He is not a candidate for MRI due to pacemaker  Risk and benefits of the procedure were discussed  Informed consent was obtained  The patient's prebiopsy preparation was deemed to be adequate  Antibiotics had been taken as prescribed  If appropriate blood thinners had been placed on hold  The patient completed an enema as prescribed  The patient was placed in the lateral decubitus position  Digital rectal examination was performed revealing a 30 gram prostate  Bilateral nodules palpable  Viscous lidocaine jelly was instilled into the rectum  Transrectal ultrasonography was then performed  The prostate measured 21 grams  No obvious mass visible on ultrasound  5 cc of 2% lidocaine were then injected bilaterally between the junction of the base of the prostate and the seminal vesicles  Ultrasound guidance was utilized to place the needle into proper position for the administration of the local anesthetic  A standard 12 core biopsy was then performed  Overall the patient tolerated the procedure and there were no complications

## 2019-09-12 NOTE — TELEPHONE ENCOUNTER
I will address  Can you please ask Iva Correia if he has tried crestor or lipitor in the past   Both are generic and much more effective than zocor  crestor dose would be 5 mg daily and lipitor dose 10 mg daily  simvistatin was the first drug to go generic and likely why he is taking it  My recommendation would be to start with one of those two and recheck LFT and lipids 3 months later  crestor is a bit more potent and very well tolerated       ThanksRicardo

## 2019-09-25 RX ORDER — ATORVASTATIN CALCIUM 20 MG/1
20 TABLET, FILM COATED ORAL DAILY
Qty: 90 TABLET | Refills: 3 | Status: CANCELLED | OUTPATIENT
Start: 2019-09-25

## 2019-09-25 NOTE — PROGRESS NOTES
This is a script that was previously sent to [facility] local pharmacy  Pt needs it sent to the [facility] mail order pharm[facility]  Thank you!

## 2019-09-26 ENCOUNTER — OFFICE VISIT (OUTPATIENT)
Dept: UROLOGY | Facility: CLINIC | Age: 74
End: 2019-09-26
Payer: MEDICARE

## 2019-09-26 VITALS
SYSTOLIC BLOOD PRESSURE: 120 MMHG | HEIGHT: 68 IN | HEART RATE: 60 BPM | BODY MASS INDEX: 22.58 KG/M2 | WEIGHT: 149 LBS | DIASTOLIC BLOOD PRESSURE: 70 MMHG

## 2019-09-26 DIAGNOSIS — C61 PROSTATE CANCER (HCC): Primary | ICD-10-CM

## 2019-09-26 DIAGNOSIS — E78.5 HYPERLIPIDEMIA: ICD-10-CM

## 2019-09-26 PROCEDURE — 99215 OFFICE O/P EST HI 40 MIN: CPT | Performed by: UROLOGY

## 2019-09-26 RX ORDER — ATORVASTATIN CALCIUM 20 MG/1
20 TABLET, FILM COATED ORAL DAILY
Qty: 90 TABLET | Refills: 3 | Status: SHIPPED | OUTPATIENT
Start: 2019-09-26 | End: 2020-06-17

## 2019-09-26 NOTE — PROGRESS NOTES
9/26/2019    Corey Husbands  1945  [id number]        Assessment  [place] 7 (3+4) prostate cancer    Plan  We reviewed his recent biopsy results which reveals 6 cores of [place] 7 prostate cancer  This is an up staging from previous  We had an extensive discussion today reviewing the implications of this finding and all of his options  He has also received his results and reviewed them  He has reviewed literature that had been given to him in the past as well  He and his wife come today with many questions  We discussed that typically surgeries not offered at his age, however exceptions can be made for certain patients in excellent health  We also discussed radiation therapy as primary curative treatment  He seems to be leaning in this direction  We discussed the possibility of second opinion pathology, as well as Prolaris testing to further evaluate the results of his biopsy and his risk  He is not concerned second opinion, however would like to proceed with molecular testing and is very interested in the results and whether it would be reasonable to continue with surveillance rather than active treatment  We will arrange this for him  In the meantime after discussion of risks and benefits of all options, he has decided to proceed with Radiation Oncology consultation  We did discuss the possibility of marker placement as well as SpaceOar  He is interested in this approach  Will plan follow-up appointment to discuss further, we may simply order surgical intervention if indicated  He understands that 1 of my colleagues would likely be performing this procedure  All questions answered to their satisfaction and they are happy with the plan  Total visit time was 40 minutes of which over 50% was spent on counseling  History of Present Illness  Hallie Wang is a 75 y o  male  of [facility], no Agent [place] exposure  Prostate cancer Hi 6 in numerous cores with PSA of 10    Bilateral nodules palpable  Opted for active surveillance, though he was still considering radiation therapy due to volume and PSA  Unable to obtain MRI due to pacemaker  He returns today for prostate biopsy results  Review of Systems  Review of Systems   Constitutional: Negative  HENT: Negative  Respiratory: Negative  Cardiovascular: Negative  Gastrointestinal: Negative  Genitourinary:        As per HPI   Musculoskeletal: Negative  Skin: Negative  Neurological: Negative  Hematological: Negative  Past Medical History  Past Medical History:   Diagnosis Date    Cancer ([facility] [address] )     skin    Elevated PSA     Narcolepsy     Pacemaker 1988       Past Social History  Past Surgical History:   Procedure Laterality Date    ATRIAL CARDIAC PACEMAKER INSERTION      CARDIAC CATHETERIZATION  02/12/2015    SKIN CANCER EXCISION      left cheek       Past Family History  Family History   Problem Relation Age of Onset    Coronary artery disease Mother     Coronary artery disease Father     Skin cancer Father     Hyperlipidemia Father     Coronary artery disease Brother        Past Social history  Social History     Socioeconomic History    Marital status: /Civil Union     Spouse name: Not on file    Number of children: 3    Years of education: Not on file    Highest education level: Not on file   Occupational History    Not on file   Social Needs    Financial resource strain: Not on file    Food insecurity:     Worry: Not on file     Inability: Not on file    Transportation needs:     Medical: Not on file     Non-medical: Not on file   Tobacco Use    Smoking status: Never Smoker    Smokeless tobacco: Never Used    Tobacco comment: No passive smoke exposure   Substance and Sexual Activity    Alcohol use:  Yes     Alcohol/week: 7 0 standard drinks     Types: 7 Glasses of wine per week     Comment: 1 glass beer/wine daily    Drug use: No    Sexual activity: Not on file Lifestyle    Physical activity:     Days per week: Not on file     Minutes per session: Not on file    Stress: Not on file   Relationships    Social connections:     Talks on phone: Not on file     Gets together: Not on file     Attends Anglican service: Not on file     Active member of club or organization: Not on file     Attends meetings of clubs or organizations: Not on file     Relationship status: Not on file    Intimate partner violence:     Fear of current or ex partner: Not on file     Emotionally abused: Not on file     Physically abused: Not on file     Forced sexual activity: Not on file   Other Topics Concern    Not on file   Social History Narrative    Not on file     Social History     Tobacco Use   Smoking Status Never Smoker   Smokeless Tobacco Never Used   Tobacco Comment    No passive smoke exposure       Current Medications  Current Outpatient Medications   Medication Sig Dispense Refill    atorvastatin (LIPITOR) 20 mg tablet Take 1 tablet (20 mg total) by mouth daily 90 tablet 3    modafinil (PROVIGIL) 200 MG tablet Take 200 mg by mouth daily      sildenafil (REVATIO) 20 mg tablet Take 3 to 5 tablets one (1) hour prior to intercourse  Take no more than 5 tablets daily  90 tablet 3     No current facility-administered medications for this visit  Allergies  No Known Allergies    Past Medical History, Social History, Family History, medications and allergies were reviewed      Vitals  Vitals:    09/26/19 1554   BP: 120/70   Pulse: 60   Weight: 67 6 kg (149 lb)   Height: 5' 8" (1 727 m)       Physical Exam  Physical Exam      Results  Lab Results   Component Value Date    PSA 10 5 (H) 06/03/2019     Lab Results   Component Value Date    CALCIUM 9 0 05/20/2019    K 4 2 05/20/2019    CO2 30 05/20/2019     05/20/2019    BUN 15 05/20/2019    CREATININE 0 90 05/20/2019     Lab Results   Component Value Date    WBC 5 56 05/20/2019    HGB 14 3 05/20/2019    HCT 43 6 05/20/2019 MCV 98 05/20/2019     05/20/2019

## 2019-09-27 ENCOUNTER — TELEPHONE (OUTPATIENT)
Dept: UROLOGY | Facility: CLINIC | Age: 74
End: 2019-09-27

## 2019-09-27 NOTE — TELEPHONE ENCOUNTER
Patient was seen yesterday by Dr Johnnie Calhoun   Patient being referred to Rad/Onc  I called Rad/Onc and patient is scheduled with Dr Nancy Sales on 10/29/19 at 8:30 at the [facility] location [address], [address]  I left message for patient to call me back to get appointment information

## 2019-10-29 ENCOUNTER — RADIATION ONCOLOGY CONSULT (OUTPATIENT)
Dept: RADIATION ONCOLOGY | Facility: CLINIC | Age: 74
End: 2019-10-29
Attending: RADIOLOGY
Payer: MEDICARE

## 2019-10-29 ENCOUNTER — TELEPHONE (OUTPATIENT)
Dept: RADIATION ONCOLOGY | Facility: CLINIC | Age: 74
End: 2019-10-29

## 2019-10-29 VITALS
RESPIRATION RATE: 12 BRPM | SYSTOLIC BLOOD PRESSURE: 138 MMHG | WEIGHT: 151.24 LBS | HEIGHT: 68 IN | HEART RATE: 59 BPM | TEMPERATURE: 97.3 F | OXYGEN SATURATION: 99 % | DIASTOLIC BLOOD PRESSURE: 60 MMHG | BODY MASS INDEX: 22.92 KG/M2

## 2019-10-29 DIAGNOSIS — C61 PROSTATE CANCER (HCC): ICD-10-CM

## 2019-10-29 DIAGNOSIS — C61 PROSTATE CANCER (HCC): Primary | ICD-10-CM

## 2019-10-29 PROCEDURE — 99211 OFF/OP EST MAY X REQ PHY/QHP: CPT | Performed by: RADIOLOGY

## 2019-10-29 RX ORDER — MELATONIN
1000 DAILY
COMMUNITY

## 2019-10-29 NOTE — TELEPHONE ENCOUNTER
Gordon Habermann, was seen in Radiation Oncology dept 10/29/19, by Dr Anh Barrientos  She is requesting Fiducial marker and Space Oar insertion, please

## 2019-10-29 NOTE — PROGRESS NOTES
Padmini Calvin 1945 is a 75 y o  male  75year old male is seen today for radiation consult for Hi 7 (3+4) prostate cancer, referred by Dr Lana Resendiz      He is a [address], served in the [employer] exposure  October 2018 he was seen by Dr Lana Resendiz for urology evaluation of elevated PSA  He had been a patient of Dr Na Hedrick prior to that for several years, seen initially for erectile dysfunction and prostate screening  His PSA in 2014 was 7 1, in 2016 increased to 8 5 and in 2018 was 10 87  Digital rectal exam by Dr Lana Resendiz revealed palpable nodule at the right base and additional left-sided nodule in the left lateral area  Biopsy was recommended  In April 2014 he had a prostate cancer gene 3 (PCA3) score of 10, which was negative for the likelihood of a positive biopsy for prostate cancer  PSA Trend:  2014 PSA 7 1  2016 PSA 8 5  2018 PSA 10 87 (done at [facility])  2/21/19 PSA 10 50  6/3/19 PSA 10 50  10/22/19  [facility] 11 94 [facility]     11/5/18 Transrectal ultrasound-guided biopsy of the prostate:   AISLINN: Palpable nodule at the right base and left lateral portions  US: 25 gram prostate, and hypoechoic area at the right base  Pathology:  Adenocarcinoma of the prostate, Hi 6 (3+3), high volume     Active surveillance was pursued, with plan to repeat prostate biopsy in one year  9/12/19 Transrectal ultrasound-guided biopsy of the prostate:   AISLINN: Bilateral palpable nodules  US: 21 gram prostate, and no obvious visible masses  Pathology:  Adenocarcinoma, Hi 7 (3+4) in 6 cores    9/26/19 seen by Dr Lana Resendiz for follow up, biopsy and treatment options discussed  Polaris molecular test ordered  Patient agreeable to radiation consult  Polaris molecular score 3 7    No MRI done due to patient having pacemaker       Family history of father with bladder cancer, he underwent radical cystectomy in his [de-identified], and lived to age 80     11/8/19 Dr Lana Resendiz FU   He did have information that DR Sarahi Rockwell would not be doing [employer] or fiducial marker, but his partner, but no further information was given to him  Prostate cancer ([facility] [address] )    11/5/2018 Initial Diagnosis     Prostate cancer ([facility] [address] )      11/5/2018 Biopsy     A  Prostate, Right Peripheral Zone, Biopsy:  - Prostatic adenocarcinoma, Desha Pattern 3 + 3 (Desha Score 6), Grade Group 1; involving 4 of 6 cores, 20 5 linear mm of carcinoma (approximately 50% of total tissue)      B  Prostate, Right Central Zone, Biopsy:  - Prostatic adenocarcinoma, Desha Pattern 3 + 3 (Desha Score 6), Grade Group 1; involving 1 of 2 cores, 9 linear mm of carcinoma (approximately 30% of total tissue)      C  Prostate, Left Peripheral Zone, Biopsy:  - Atypical small acinar proliferation (ASAP), focally present in 1 of 2 cores      D  Prostate, Left Central Zone, Biopsy:  - Prostatic adenocarcinoma, Hi Pattern 3 + 3 (Desha Score 6), Grade Group 1; discontinuously involving 4 of 4 cores, 9 linear mm of carcinoma (approximately 20% of total tissue)  - High-grade prostatic intraepithelial neoplasia (HGPIN)  Dr Rodolfo Enriquez        9/12/2019 Biopsy     A  Right lateral base prostate core biopsy:  - Adenocarcinoma, acinar type, Hi score 3+4=7, Grade Group 2  - Pattern 4 - 5% of tumor  - Continuously involving 80% of core  - No perineural invasion seen     B  Right lateral mid prostate core biopsy:  - Benign prostatic tissue     C  Right lateral apex prostate core biopsy:  - Benign prostatic tissue     D  Right medial base prostate core biopsy:  - Adenocarcinoma, acinar type, Hi score 3+4=7, Grade Group 2  - Pattern 4 - 10% of tumor  - Discontinuously involving 45% of core  - Perineural invasion is seen     E  Right medial mid prostate core biopsy:  - Adenocarcinoma, acinar type, [place]ha score 3+4=7, Grade Group 2  - Pattern 4 - 10% of tumor  - Continuously involving 10% of core  - No perineural invasion seen     F   Right medial apex prostate core biopsy:  - Benign prostatic tissue     G  Left medial base  Prostate core biopsy:  - Adenocarcinoma, acinar type, Hi score 3+4=7, Grade Group 2  - Pattern 4 - 25% of tumor  - Continuously involving 5% of core  - No perineural invasion seen     H  Left medial mid prostate core biopsy:  - Benign prostatic tissue     I  Left medial apex prostate core bopsy:  - Benign prostatic tissue     J  Left lateral base prostate core biopsy:   - Adenocarcinoma, acinar type, [place] score 3+4=7, Grade Group 2  - Pattern 4 - 20% of tumor  - Discontinuously involving 25% of core  - No perineural invasion seen     K  Left lateral mid prostate core biopsy:   - Adenocarcinoma, acinar type, Hi score 3+4=7, Grade Group 2  - Pattern 4 - 5% of tumor  - Continuously involving 10% of core  - No perineural invasion seen     L   Left lateral apex prostate core biopsy:  - Benign prostatic tissue         Clinical Trial: no    Health Maintenance   Topic Date Due    Medicare Annual Wellness Visit (AWV)  1945    CRC Screening: Colonoscopy  1945    DTaP,Tdap,and Td Vaccines (1 - Tdap) 05/27/1965    Pneumococcal Vaccine: 65+ Years (2 of 2 - PCV13) 02/28/2017    INFLUENZA VACCINE  07/01/2019    Fall Risk  05/31/2020    Depression Screening PHQ  05/31/2020    BMI: Adult  09/26/2020    Pneumococcal Vaccine: Pediatrics (0 to 5 Years) and At-Risk Patients (6 to 59 Years)  Aged Out    HEPATITIS B VACCINES  Aged Out       Past Medical History:   Diagnosis Date    Cancer ([facility] [address] )     skin    Elevated PSA     Narcolepsy     Pacemaker 1988    Prostate cancer [facility])        Past Surgical History:   Procedure Laterality Date    ATRIAL CARDIAC PACEMAKER INSERTION      CARDIAC CATHETERIZATION  02/12/2015    SKIN CANCER EXCISION      left cheek       Family History   Problem Relation Age of Onset    Coronary artery disease Mother     Coronary artery disease Father     Skin cancer Father     Hyperlipidemia Father  Cancer Father         bladder cancer    Coronary artery disease Brother        Social History     Tobacco Use    Smoking status: Never Smoker    Smokeless tobacco: Never Used    Tobacco comment: No passive smoke exposure   Substance Use Topics    Alcohol use: Yes     Alcohol/week: 7 0 standard drinks     Types: 7 Glasses of wine per week     Comment: 1 glass beer/wine daily    Drug use: No          Current Outpatient Medications:     atorvastatin (LIPITOR) 20 mg tablet, Take 1 tablet (20 mg total) by mouth daily, Disp: 90 tablet, Rfl: 3    cholecalciferol (VITAMIN D3) 1,000 units tablet, Take 1,000 Units by mouth daily, Disp: , Rfl:     modafinil (PROVIGIL) 200 MG tablet, Take 200 mg by mouth daily, Disp: , Rfl:     sildenafil (REVATIO) 20 mg tablet, Take 3 to 5 tablets one (1) hour prior to intercourse  Take no more than 5 tablets daily  , Disp: 90 tablet, Rfl: 3    No Known Allergies     Review of Systems:  Review of Systems   Constitutional: Positive for fatigue  HENT: Positive for hearing loss  Eyes: Negative  Respiratory: Negative  Cardiovascular:        Has pacemaker   Gastrointestinal: Negative  Endocrine: Negative  Genitourinary:        Nocturia 2-3  Musculoskeletal: Negative  Skin: Negative  Allergic/Immunologic: Negative  Neurological: Negative  Hematological: Negative  Psychiatric/Behavioral: Negative          Vitals:    10/29/19 0847   BP: 138/60   Pulse: 59   Resp: 12   Temp: (!) 97 3 °F (36 3 °C)   SpO2: 99%   Weight: 68 6 kg (151 lb 3 8 oz)   Height: 5' 8" (1 727 m)            Pain assessment: 0    PSA, Diagnostic   Date Value Ref Range Status   06/03/2019 10 5 (H) 0 0 - 4 0 ng/mL Final     Comment:       American Urological Association Guidelines define biochemical recurrence of prostate cancer as a detectable or rising PSA value post-radical prostatectomy that is greater than or equal to 0 2 ng/mL with a second confirmatory level of greater than or equal to 0 2 ng/mL    :    Imaging:No images are attached to the encounter       Teaching: Prostate EBRT/ simulation

## 2019-10-29 NOTE — TELEPHONE ENCOUNTER
Spoke to [facility]  Janett Vidal wants to discuss Lupron with Dr Pato Alexandre before agreeing to it  He is scheduled to follow up with Dr Pato Alexandre on 11/8/19

## 2019-10-29 NOTE — PROGRESS NOTES
Consultation - Radiation Oncology     YR : 1945  Encounter: [id number]  Patient Information: [address]  Chief Complaint   Patient presents with    Prostate Cancer     Cancer Staging  No matching staging information was found for the patient  History of Present Illness   Hermilo Martin is a 75y o  year old male who is seen today for radiation consult for [place] 7 (3+4) prostate cancer, referred by Dr Srikanth Washington       The patient was seen in 2018  by Dr Srikanth Washington for urology evaluation of elevated PSA  He had been a patient of Dr Aisha Jean prior to that for several years, seen initially for erectile dysfunction and prostate screening  His PSA in  was 7 1, in  increased to 8 5 and in  was 10 87  Digital rectal exam by Dr Srikanth Washington revealed palpable nodule at the right base and additional left-sided nodule in the left lateral area  Biopsy was recommended       In 2014 he had a prostate cancer gene 3 (PCA3) score of 10, which was negative for the likelihood of a positive biopsy for prostate cancer       PSA Trend:   PSA 7 1   PSA 8 5   PSA 10 87 (done at [place])  19 PSA 10 50  6/3/19 PSA 10 50  10/22/19  [facility] 11 94 [facility]      18 Transrectal ultrasound-guided biopsy of the prostate:   AISLINN: Palpable nodule at the right base and left lateral portions  US: 25 gram prostate, and hypoechoic area at the right base  Pathology:  Adenocarcinoma of the prostate, [place] 6 (3+3), high volume      Active surveillance was pursued, with plan to repeat prostate biopsy in one year       19 Transrectal ultrasound-guided biopsy of the prostate:   AISLINN: Bilateral palpable nodules  US: 21 gram prostate, and no obvious visible masses  Pathology:  Adenocarcinoma, [place] 7 (3+4) in 6 cores     19 seen by Dr Srikanth Washington for follow up, biopsy and treatment options discussed  Polaris molecular test ordered   Patient agreeable to radiation consult       Polaris molecular score 3 7     No MRI done due to patient having pacemaker       Family history of father with bladder cancer, he underwent radical cystectomy in his [de-identified], and lived to age 80      11/8/19 Dr Louann AMIN   He did have information that DR Louann Rivas would not be doing [employer] or fiducial marker              He is a [address], served in the [employer], no [employer] exposure     Beverley Diaz denies any significant urinary symptomatology  In particular he has a good urinary stream   He has nocturia several times a night  No dysuria or hematuria  No symptoms of proctitis  He does of have some erectile dysfunction for which he uses sildenifil        Historical Information      [facility] )    11/5/2018 Initial Diagnosis     [facility] )      11/5/2018 Biopsy     A  Prostate, Right Peripheral Zone, Biopsy:  - Prostatic adenocarcinoma, [place] Pattern 3 + 3 (Hi Score 6), Grade Group 1; involving 4 of 6 cores, 20 5 linear mm of carcinoma (approximately 50% of total tissue)      B  Prostate, Right Central Zone, Biopsy:  - Prostatic adenocarcinoma, Hi Pattern 3 + 3 ([place] Score 6), Grade Group 1; involving 1 of 2 cores, 9 linear mm of carcinoma (approximately 30% of total tissue)      C  Prostate, Left Peripheral Zone, Biopsy:  - Atypical small acinar proliferation (ASAP), focally present in 1 of 2 cores      D  Prostate, Left Central Zone, Biopsy:  - Prostatic adenocarcinoma, Hi Pattern 3 + 3 (Hi Score 6), Grade Group 1; discontinuously involving 4 of 4 cores, 9 linear mm of carcinoma (approximately 20% of total tissue)  - High-grade prostatic intraepithelial neoplasia (HGPIN)  Dr Giselle Escamilla        9/12/2019 Biopsy     A  Right lateral base prostate core biopsy:  - Adenocarcinoma, acinar type, [place] score 3+4=7, Grade Group 2  - Pattern 4 - 5% of tumor  - Continuously involving 80% of core  - No perineural invasion seen     B   Right lateral mid prostate core biopsy:  - Benign prostatic tissue     C  Right lateral apex prostate core biopsy:  - Benign prostatic tissue     D  Right medial base prostate core biopsy:  - Adenocarcinoma, acinar type, Hi score 3+4=7, Grade Group 2  - Pattern 4 - 10% of tumor  - Discontinuously involving 45% of core  - Perineural invasion is seen     E  Right medial mid prostate core biopsy:  - Adenocarcinoma, acinar type, [place] score 3+4=7, Grade Group 2  - Pattern 4 - 10% of tumor  - Continuously involving 10% of core  - No perineural invasion seen     F  Right medial apex prostate core biopsy:  - Benign prostatic tissue     G  Left medial base  Prostate core biopsy:  - Adenocarcinoma, acinar type, Hi score 3+4=7, Grade Group 2  - Pattern 4 - 25% of tumor  - Continuously involving 5% of core  - No perineural invasion seen     H  Left medial mid prostate core biopsy:  - Benign prostatic tissue     I  Left medial apex prostate core bopsy:  - Benign prostatic tissue     J  Left lateral base prostate core biopsy:   - Adenocarcinoma, acinar type, Hi score 3+4=7, Grade Group 2  - Pattern 4 - 20% of tumor  - Discontinuously involving 25% of core  - No perineural invasion seen     K  Left lateral mid prostate core biopsy:   - Adenocarcinoma, acinar type, [place] score 3+4=7, Grade Group 2  - Pattern 4 - 5% of tumor  - Continuously involving 10% of core  - No perineural invasion seen     L   Left lateral apex prostate core biopsy:  - Benign prostatic tissue           Past Medical History:   Diagnosis Date    Cancer ([facility] [address] )     skin    Elevated PSA     Narcolepsy     Pacemaker 1988    Prostate cancer [facility])      Past Surgical History:   Procedure Laterality Date    ATRIAL CARDIAC PACEMAKER INSERTION      CARDIAC CATHETERIZATION  02/12/2015    SKIN CANCER EXCISION      left cheek       Family History   Problem Relation Age of Onset    Coronary artery disease Mother     Coronary artery disease Father     Skin cancer Father     Hyperlipidemia Father     Cancer Father         bladder cancer    Coronary artery disease Brother        Social History   Social History     Substance and Sexual Activity   Alcohol Use Yes    Alcohol/week: 7 0 standard drinks    Types: 7 Glasses of wine per week    Comment: 1 glass beer/wine daily     Social History     Substance and Sexual Activity   Drug Use No     Social History     Tobacco Use   Smoking Status Never Smoker   Smokeless Tobacco Never Used   Tobacco Comment    No passive smoke exposure         Meds/Allergies     Current Outpatient Medications:     atorvastatin (LIPITOR) 20 mg tablet, Take 1 tablet (20 mg total) by mouth daily, Disp: 90 tablet, Rfl: 3    cholecalciferol (VITAMIN D3) 1,000 units tablet, Take 1,000 Units by mouth daily, Disp: , Rfl:     modafinil (PROVIGIL) 200 MG tablet, Take 200 mg by mouth daily, Disp: , Rfl:     sildenafil (REVATIO) 20 mg tablet, Take 3 to 5 tablets one (1) hour prior to intercourse  Take no more than 5 tablets daily  , Disp: 90 tablet, Rfl: 3  No Known Allergies      Review of Systems     Constitutional: Positive for fatigue  HENT: Positive for hearing loss  Eyes: Negative  Respiratory: Negative  Cardiovascular:        Has pacemaker   Gastrointestinal: Negative  Endocrine: Negative  Genitourinary:        Nocturia 2-3  Musculoskeletal: Negative  Skin: Negative  Allergic/Immunologic: Negative  Neurological: Negative  Hematological: Negative  Psychiatric/Behavioral: Negative             OBJECTIVE:   /60   Pulse 59   Temp (!) 97 3 °F (36 3 °C)   Resp 12   Ht 5' 8" (1 727 m)   Wt 68 6 kg (151 lb 3 8 oz)   SpO2 99%   BMI 23 00 kg/m²   Pain Assessment:  0  Performance Status: ECOG/Zubrod/WHO: 0 - Asymptomatic    Physical Exam   Constitutional: He appears well-developed  HENT:   Head: Normocephalic  Mouth/Throat: Oropharynx is clear and moist    Eyes: Pupils are equal, round, and reactive to light  EOM are normal    Neck: Normal range of motion  Neck supple  Cardiovascular: Normal rate, regular rhythm and normal heart sounds  Pulmonary/Chest: Effort normal and breath sounds normal  He has no wheezes  He exhibits no tenderness  Abdominal: Soft  Bowel sounds are normal  He exhibits no distension and no mass  There is no tenderness  Genitourinary: Rectum normal    Genitourinary Comments: Rectal exam reveals a nodule noted in the right base  No blood at the tip of the examining glove   Musculoskeletal: Normal range of motion  He exhibits no edema  Lymphadenopathy:     He has no cervical adenopathy  Neurological: He is alert  No cranial nerve deficit  Coordination normal    Skin: Skin is warm  No rash noted  No erythema  Psychiatric: He has a normal mood and affect  His behavior is normal    Vitals reviewed  RESULTS  Lab Results    Chemistry        Component Value Date/Time    K 4 2 05/20/2019 0813     05/20/2019 0813    CO2 30 05/20/2019 0813    BUN 15 05/20/2019 0813    CREATININE 0 90 05/20/2019 0813        Component Value Date/Time    CALCIUM 9 0 05/20/2019 0813    ALKPHOS 61 05/20/2019 0813    AST 29 05/20/2019 0813    ALT 27 05/20/2019 0813            Lab Results   Component Value Date    WBC 5 56 05/20/2019    HGB 14 3 05/20/2019    HCT 43 6 05/20/2019    MCV 98 05/20/2019     05/20/2019         Imaging Studies  No results found  Pathology:  Final Diagnosis  A  Right lateral base prostate core biopsy:  - Adenocarcinoma, acinar type, [place] score 3+4=7, Grade Group 2  - Pattern 4 - 5% of tumor  - Continuously involving 80% of core  - No perineural invasion seen  B  Right lateral mid prostate core biopsy:  - Benign prostatic tissue  C  Right lateral apex prostate core biopsy:  - Benign prostatic tissue  D   Right medial base prostate core biopsy:  - Adenocarcinoma, acinar type, Hi score 3+4=7, Grade Group 2  - Pattern 4 - 10% of tumor  - Discontinuously involving 45% of core  - Perineural invasion is seen  E  Right medial mid prostate core biopsy:  - Adenocarcinoma, acinar type, Hi score 3+4=7, Grade Group 2  - Pattern 4 - 10% of tumor  - Continuously involving 10% of core  - No perineural invasion seen  F  Right medial apex prostate core biopsy:  - Benign prostatic tissue  G  Left medial base Prostate core biopsy:  - Adenocarcinoma, acinar type, Hi score 3+4=7, Grade Group 2  - Pattern 4 - 25% of tumor  - Continuously involving 5% of core  - No perineural invasion seen  H  Left medial mid prostate core biopsy:  - Benign prostatic tissue  I  Left medial apex prostate core bopsy:  - Benign prostatic tissue  J  Left lateral base prostate core biopsy:  - Adenocarcinoma, acinar type, Hi score 3+4=7, Grade Group 2  - Pattern 4 - 20% of tumor  - Discontinuously involving 25% of core  - No perineural invasion seen  K  Left lateral mid prostate core biopsy:  - Adenocarcinoma, acinar type, Hi score 3+4=7, Grade Group 2  - Pattern 4 - 5% of tumor  - Continuously involving 10% of core        ASSESSMENT  1  Prostate cancer [facility])  Ambulatory referral to Radiation Oncology     Cancer Staging  No matching staging information was found for the patient  PLAN/DISCUSSION  No orders of the defined types were placed in this encounter  Abril Jurado is a 75y o  year old male with adenocarcinoma [place] score 7 (3+ 4) of the prostate  His PSA is greater than 10  He has 50% of his cores positive for malignancy placing him in the intermediate risk, unfavorable category  He has been on active surveillance  We discussed with his rise in Hi score as well as number of positive cores for malignancy consideration for definitive treatment would be recommended  I reviewed with him radiation treatment options  In particular we discussed dose escalated external beam radiation treatment    He understands the goal of treatment as well as possible side effects both acute and long-term  We discussed that these can include fatigue, urinary frequency/urgency, dysuria, enteritis, proctitis, cystitis, urethral stenosis, sexual dysfunction  Placement for fiducial markers to assist with daily IGRT was reviewed with him  In addition we discussed spaceoar placement which will reduce his risk of proctitis  We discussed that this would be stay in place for 12 weeks during the course of treatment  In addition we discussed consideration for short term ADT therapy which he did not seem to be in favor of  He will discuss with Dr Rickie Cooley   He has signed consent for proceeding with treatment and we will coordinate with the nurse navigator regarding his markers and space or placement and timing of his CT simulation  The plan will be to treat the prostate and proximal seminal vesicles to dose of 7920 cGy  He is agreeable to the above outlined plan  Charmaine Thompson MD  10/29/2019,9:16 AM      Portions of the record may have been created with voice recognition software   Occasional wrong word or "sound a like" substitutions may have occurred due to the inherent limitations of voice recognition software   Read the chart carefully and recognize, using context, where substitutions have occurred

## 2019-10-31 ENCOUNTER — IN-CLINIC DEVICE VISIT (OUTPATIENT)
Dept: CARDIOLOGY CLINIC | Facility: CLINIC | Age: 74
End: 2019-10-31
Payer: MEDICARE

## 2019-10-31 DIAGNOSIS — Z95.0 PRESENCE OF CARDIAC PACEMAKER: ICD-10-CM

## 2019-10-31 DIAGNOSIS — I49.5 SSS (SICK SINUS SYNDROME) (HCC): Primary | ICD-10-CM

## 2019-10-31 PROCEDURE — 93280 PM DEVICE PROGR EVAL DUAL: CPT | Performed by: INTERNAL MEDICINE

## 2019-10-31 NOTE — PROGRESS NOTES
Results for orders placed or performed in visit on 10/31/19   Cardiac EP device report    Narrative    SJM DUAL PPM  DEVICE INTERROGATED IN THE [place] OFFICE  BATTERY VOLTAGE ADEQUATE (2 75 YRS)  AP: 14%  : 14%  ALL LEAD PARAMETERS WITHIN NORMAL LIMITS  NO SIGNIFICANT HIGH RATE EPISODES  NO PROGRAMMING CHANGES MADE TO DEVICE PARAMETERS  PACEMAKER FUNCTIONING APPROPRIATELY    [address] [place] [address]

## 2019-11-04 ENCOUNTER — TELEPHONE (OUTPATIENT)
Dept: RADIATION ONCOLOGY | Facility: HOSPITAL | Age: 74
End: 2019-11-04

## 2019-11-08 ENCOUNTER — OFFICE VISIT (OUTPATIENT)
Dept: UROLOGY | Facility: AMBULATORY SURGERY CENTER | Age: 74
End: 2019-11-08
Payer: MEDICARE

## 2019-11-08 VITALS
DIASTOLIC BLOOD PRESSURE: 66 MMHG | BODY MASS INDEX: 22.73 KG/M2 | HEART RATE: 60 BPM | HEIGHT: 68 IN | SYSTOLIC BLOOD PRESSURE: 122 MMHG | WEIGHT: 150 LBS

## 2019-11-08 DIAGNOSIS — C61 PROSTATE CANCER (HCC): Primary | ICD-10-CM

## 2019-11-08 PROCEDURE — 99214 OFFICE O/P EST MOD 30 MIN: CPT | Performed by: UROLOGY

## 2019-11-08 NOTE — PROGRESS NOTES
11/8/2019    Helena Cole  1945  [id number]        Assessment  [place] 7 prostate cancer    Plan  We had an extensive discussion today about his treatment options  He is inclined to forego androgen deprivation therapy  After discussion of indications, risks and benefits we agreed that it is not absolutely imperative diffuse androgen deprivation in conjunction with radiation therapy for his degree of prostate cancer  We also discussed the technique, risks, benefits for fiducial marker placement and space oar  He understands this will be done by 1 of my colleagues in the operating room and consent will be obtained by the performing surgeon  All questions answered to his satisfaction  This will need to be performed in conjunction with radiation oncology scheduling for appropriate timing of treatment  History of Present Illness  Irene Espinosa is a 75 y o  male with PSA greater than 10 and recent diagnosis of [place] 7 prostate cancer 3+4, identified in 6/12 random biopsy cores  He has decided upon radiation therapy  He presents today to discuss androgen deprivation therapy and also fiducial marker placement and spaceoar  Review of Systems  Review of Systems   Constitutional: Negative  HENT: Negative  Respiratory: Negative  Cardiovascular: Negative  Gastrointestinal: Negative  Genitourinary:        As per HPI   Musculoskeletal: Negative  Skin: Negative  Neurological: Negative  Hematological: Negative            Past Medical History  Past Medical History:   Diagnosis Date    Cancer ([facility] [address] )     skin    Elevated PSA     Narcolepsy     Pacemaker 1988    Prostate cancer [facility])        Past Social History  Past Surgical History:   Procedure Laterality Date    ATRIAL CARDIAC PACEMAKER INSERTION      CARDIAC CATHETERIZATION  02/12/2015    SKIN CANCER EXCISION      left cheek       Past Family History  Family History   Problem Relation Age of Onset    Coronary artery disease Mother     Coronary artery disease Father     Skin cancer Father     Hyperlipidemia Father     Cancer Father         bladder cancer    Coronary artery disease Brother        Past Social history  Social History     Socioeconomic History    Marital status: /Civil Union     Spouse name: Not on file    Number of children: 3    Years of education: Not on file    Highest education level: Not on file   Occupational History    Not on file   Social Needs    Financial resource strain: Not on file    Food insecurity:     Worry: Not on file     Inability: Not on file    Transportation needs:     Medical: Not on file     Non-medical: Not on file   Tobacco Use    Smoking status: Never Smoker    Smokeless tobacco: Never Used    Tobacco comment: No passive smoke exposure   Substance and Sexual Activity    Alcohol use:  Yes     Alcohol/week: 7 0 standard drinks     Types: 7 Glasses of wine per week     Comment: 1 glass beer/wine daily    Drug use: No    Sexual activity: Yes   Lifestyle    Physical activity:     Days per week: Not on file     Minutes per session: Not on file    Stress: Not on file   Relationships    Social connections:     Talks on phone: Not on file     Gets together: Not on file     Attends [employer] service: Not on file     Active member of club or organization: Not on file     Attends meetings of clubs or organizations: Not on file     Relationship status: Not on file    Intimate partner violence:     Fear of current or ex partner: Not on file     Emotionally abused: Not on file     Physically abused: Not on file     Forced sexual activity: Not on file   Other Topics Concern    Not on file   Social History Narrative    Not on file     Social History     Tobacco Use   Smoking Status Never Smoker   Smokeless Tobacco Never Used   Tobacco Comment    No passive smoke exposure       Current Medications  Current Outpatient Medications   Medication Sig Dispense Refill    atorvastatin (LIPITOR) 20 mg tablet Take 1 tablet (20 mg total) by mouth daily 90 tablet 3    cholecalciferol (VITAMIN D3) 1,000 units tablet Take 1,000 Units by mouth daily      modafinil (PROVIGIL) 200 MG tablet Take 200 mg by mouth daily      sildenafil (REVATIO) 20 mg tablet Take 3 to 5 tablets one (1) hour prior to intercourse  Take no more than 5 tablets daily  90 tablet 3     No current facility-administered medications for this visit  Allergies  No Known Allergies    Past Medical History, Social History, Family History, medications and allergies were reviewed  Vitals  Vitals:    11/08/19 1506   BP: 122/66   BP Location: Left arm   Patient Position: Sitting   Cuff Size: Standard   Pulse: 60   Weight: 68 kg (150 lb)   Height: 5' 8" (1 727 m)       Physical Exam  Physical Exam   Constitutional: He is oriented to person, place, and time  He appears well-developed and well-nourished  Cardiovascular: Normal rate and regular rhythm  Pulmonary/Chest: Effort normal and breath sounds normal    Abdominal: Soft  Musculoskeletal: Normal range of motion  Neurological: He is alert and oriented to person, place, and time  Skin: Skin is warm, dry and intact  Psychiatric: He has a normal mood and affect  Vitals reviewed          Results  Lab Results   Component Value Date    PSA 10 5 (H) 06/03/2019     Lab Results   Component Value Date    CALCIUM 9 0 05/20/2019    K 4 2 05/20/2019    CO2 30 05/20/2019     05/20/2019    BUN 15 05/20/2019    CREATININE 0 90 05/20/2019     Lab Results   Component Value Date    WBC 5 56 05/20/2019    HGB 14 3 05/20/2019    HCT 43 6 05/20/2019    MCV 98 05/20/2019     05/20/2019

## 2019-11-14 ENCOUNTER — TELEPHONE (OUTPATIENT)
Dept: RADIATION ONCOLOGY | Facility: HOSPITAL | Age: 74
End: 2019-11-14

## 2019-11-14 NOTE — TELEPHONE ENCOUNTER
Patient scheduled for SpaceOAR/fiducial markers on 12/19/19  Please schedule for SIM 7-10 days after placement

## 2019-11-18 ENCOUNTER — TELEPHONE (OUTPATIENT)
Dept: RADIATION ONCOLOGY | Facility: HOSPITAL | Age: 74
End: 2019-11-18

## 2019-12-03 ENCOUNTER — LAB (OUTPATIENT)
Dept: LAB | Facility: HOSPITAL | Age: 74
End: 2019-12-03
Attending: UROLOGY
Payer: MEDICARE

## 2019-12-03 ENCOUNTER — HOSPITAL ENCOUNTER (OUTPATIENT)
Dept: NON INVASIVE DIAGNOSTICS | Facility: HOSPITAL | Age: 74
Discharge: HOME | End: 2019-12-03
Attending: UROLOGY
Payer: MEDICARE

## 2019-12-03 ENCOUNTER — TELEPHONE (OUTPATIENT)
Dept: UROLOGY | Facility: CLINIC | Age: 74
End: 2019-12-03

## 2019-12-03 ENCOUNTER — LAB (OUTPATIENT)
Dept: LAB | Facility: HOSPITAL | Age: 74
End: 2019-12-03
Attending: INTERNAL MEDICINE
Payer: MEDICARE

## 2019-12-03 DIAGNOSIS — I49.5 SSS (SICK SINUS SYNDROME) (HCC): ICD-10-CM

## 2019-12-03 DIAGNOSIS — E78.00 PURE HYPERCHOLESTEROLEMIA: ICD-10-CM

## 2019-12-03 DIAGNOSIS — C61 PROSTATE CANCER (HCC): ICD-10-CM

## 2019-12-03 LAB
ALBUMIN SERPL BCP-MCNC: 3.7 G/DL (ref 3.5–5)
ALP SERPL-CCNC: 60 U/L (ref 46–116)
ALT SERPL W P-5'-P-CCNC: 25 U/L (ref 12–78)
ANION GAP SERPL CALCULATED.3IONS-SCNC: 5 MMOL/L (ref 4–13)
AST SERPL W P-5'-P-CCNC: 26 U/L (ref 5–45)
BASOPHILS # BLD AUTO: 0.03 THOUSANDS/ΜL (ref 0–0.1)
BASOPHILS NFR BLD AUTO: 1 % (ref 0–1)
BILIRUB DIRECT SERPL-MCNC: 0.16 MG/DL (ref 0–0.2)
BILIRUB SERPL-MCNC: 0.54 MG/DL (ref 0.2–1)
BUN SERPL-MCNC: 12 MG/DL (ref 5–25)
CALCIUM SERPL-MCNC: 8.6 MG/DL (ref 8.3–10.1)
CHLORIDE SERPL-SCNC: 104 MMOL/L (ref 100–108)
CHOLEST SERPL-MCNC: 162 MG/DL (ref 50–200)
CO2 SERPL-SCNC: 32 MMOL/L (ref 21–32)
CREAT SERPL-MCNC: 0.98 MG/DL (ref 0.6–1.3)
EOSINOPHIL # BLD AUTO: 0.18 THOUSAND/ΜL (ref 0–0.61)
EOSINOPHIL NFR BLD AUTO: 4 % (ref 0–6)
ERYTHROCYTE [DISTWIDTH] IN BLOOD BY AUTOMATED COUNT: 12.2 % (ref 11.6–15.1)
GFR SERPL CREATININE-BSD FRML MDRD: 75 ML/MIN/1.73SQ M
GLUCOSE P FAST SERPL-MCNC: 94 MG/DL (ref 65–99)
HCT VFR BLD AUTO: 45.7 % (ref 36.5–49.3)
HDLC SERPL-MCNC: 61 MG/DL
HGB BLD-MCNC: 15 G/DL (ref 12–17)
IMM GRANULOCYTES # BLD AUTO: 0.02 THOUSAND/UL (ref 0–0.2)
IMM GRANULOCYTES NFR BLD AUTO: 0 % (ref 0–2)
LDLC SERPL CALC-MCNC: 90 MG/DL (ref 0–100)
LYMPHOCYTES # BLD AUTO: 1.26 THOUSANDS/ΜL (ref 0.6–4.47)
LYMPHOCYTES NFR BLD AUTO: 25 % (ref 14–44)
MCH RBC QN AUTO: 31.7 PG (ref 26.8–34.3)
MCHC RBC AUTO-ENTMCNC: 32.8 G/DL (ref 31.4–37.4)
MCV RBC AUTO: 97 FL (ref 82–98)
MONOCYTES # BLD AUTO: 0.49 THOUSAND/ΜL (ref 0.17–1.22)
MONOCYTES NFR BLD AUTO: 10 % (ref 4–12)
NEUTROPHILS # BLD AUTO: 2.98 THOUSANDS/ΜL (ref 1.85–7.62)
NEUTS SEG NFR BLD AUTO: 60 % (ref 43–75)
NRBC BLD AUTO-RTO: 0 /100 WBCS
PLATELET # BLD AUTO: 202 THOUSANDS/UL (ref 149–390)
PMV BLD AUTO: 10.1 FL (ref 8.9–12.7)
POTASSIUM SERPL-SCNC: 4.3 MMOL/L (ref 3.5–5.3)
PROT SERPL-MCNC: 6.6 G/DL (ref 6.4–8.2)
RBC # BLD AUTO: 4.73 MILLION/UL (ref 3.88–5.62)
SODIUM SERPL-SCNC: 141 MMOL/L (ref 136–145)
TRIGL SERPL-MCNC: 57 MG/DL
WBC # BLD AUTO: 4.96 THOUSAND/UL (ref 4.31–10.16)

## 2019-12-03 PROCEDURE — 80048 BASIC METABOLIC PNL TOTAL CA: CPT

## 2019-12-03 PROCEDURE — 85025 COMPLETE CBC W/AUTO DIFF WBC: CPT

## 2019-12-03 PROCEDURE — 93005 ELECTROCARDIOGRAM TRACING: CPT

## 2019-12-03 PROCEDURE — 36415 COLL VENOUS BLD VENIPUNCTURE: CPT

## 2019-12-03 PROCEDURE — 80076 HEPATIC FUNCTION PANEL: CPT

## 2019-12-03 PROCEDURE — 80061 LIPID PANEL: CPT

## 2019-12-04 LAB
ATRIAL RATE: 48 BPM
ATRIAL RATE: 49 BPM
P AXIS: 70 DEGREES
PR INTERVAL: 186 MS
QRS AXIS: 39 DEGREES
QRS AXIS: 47 DEGREES
QRSD INTERVAL: 44 MS
QRSD INTERVAL: 88 MS
QT INTERVAL: 336 MS
QT INTERVAL: 424 MS
QTC INTERVAL: 304 MS
QTC INTERVAL: 378 MS
T WAVE AXIS: -58 DEGREES
T WAVE AXIS: 9 DEGREES
VENTRICULAR RATE: 48 BPM
VENTRICULAR RATE: 49 BPM

## 2019-12-04 PROCEDURE — 93010 ELECTROCARDIOGRAM REPORT: CPT | Performed by: INTERNAL MEDICINE

## 2019-12-09 NOTE — PRE-PROCEDURE INSTRUCTIONS
Pre-Surgery Instructions:   Medication Instructions    atorvastatin (LIPITOR) 20 mg tablet Instructed patient per Anesthesia Guidelines   cholecalciferol (VITAMIN D3) 1,000 units tablet Instructed patient per Anesthesia Guidelines   modafinil (PROVIGIL) 200 MG tablet Instructed patient per Anesthesia Guidelines   sildenafil (REVATIO) 20 mg tablet Instructed patient per Anesthesia Guidelines  Preop,medications and showering instructions using an antibacterial soap reviewed  Instructed to follow Dr Cody Severin

## 2019-12-10 ENCOUNTER — ANESTHESIA EVENT (OUTPATIENT)
Dept: PERIOP | Facility: HOSPITAL | Age: 74
End: 2019-12-10
Payer: MEDICARE

## 2019-12-12 ENCOUNTER — OFFICE VISIT (OUTPATIENT)
Dept: FAMILY MEDICINE CLINIC | Facility: CLINIC | Age: 74
End: 2019-12-12
Payer: MEDICARE

## 2019-12-12 ENCOUNTER — TELEPHONE (OUTPATIENT)
Dept: FAMILY MEDICINE CLINIC | Facility: CLINIC | Age: 74
End: 2019-12-12

## 2019-12-12 VITALS
HEART RATE: 58 BPM | BODY MASS INDEX: 22.79 KG/M2 | DIASTOLIC BLOOD PRESSURE: 60 MMHG | OXYGEN SATURATION: 98 % | SYSTOLIC BLOOD PRESSURE: 104 MMHG | WEIGHT: 150.4 LBS | TEMPERATURE: 98.1 F | HEIGHT: 68 IN

## 2019-12-12 DIAGNOSIS — C61 PROSTATE CANCER (HCC): Primary | ICD-10-CM

## 2019-12-12 PROBLEM — K40.90 INGUINAL HERNIA: Chronic | Status: ACTIVE | Noted: 2019-12-12

## 2019-12-12 PROCEDURE — 99213 OFFICE O/P EST LOW 20 MIN: CPT | Performed by: FAMILY MEDICINE

## 2019-12-12 PROCEDURE — 90670 PCV13 VACCINE IM: CPT | Performed by: FAMILY MEDICINE

## 2019-12-12 PROCEDURE — G0009 ADMIN PNEUMOCOCCAL VACCINE: HCPCS | Performed by: FAMILY MEDICINE

## 2019-12-12 PROCEDURE — 1123F ACP DISCUSS/DSCN MKR DOCD: CPT | Performed by: FAMILY MEDICINE

## 2019-12-12 NOTE — PROGRESS NOTES
Assessment/Plan:    No problem-specific Assessment & Plan notes found for this encounter  There are no diagnoses linked to this encounter  Subjective:      Patient ID: Alley Mata is a 75 y o  male  Dorla Hunting comes for evaluation of possible left groin hernia  He has been having some shooting discomfort in the groin off and on for about 2 weeks over the last several days it is a little bit worse he notices a bulge there as well which is new  Cold sx ; The following portions of the patient's history were reviewed and updated as appropriate: allergies, current medications, past family history, past medical history, past social history, past surgical history and problem list     Review of Systems   Constitutional: Negative for chills and fever  HENT: Negative for congestion, ear pain, rhinorrhea, sinus pain and sore throat  Eyes: Negative for visual disturbance  Respiratory: Negative for cough, shortness of breath and wheezing  Cardiovascular: Negative for chest pain  Gastrointestinal: Negative for abdominal distention, abdominal pain, constipation and diarrhea  Genitourinary: Negative for difficulty urinating  Skin: Negative for rash  Objective:  Vitals:    12/12/19 1557   BP: 104/60   BP Location: Left arm   Patient Position: Sitting   Cuff Size: Adult   Pulse: 58   Temp: 98 1 °F (36 7 °C)   TempSrc: Temporal   SpO2: 98%   Weight: 68 2 kg (150 lb 6 4 oz)   Height: 5' 8" (1 727 m)      Physical Exam   Constitutional: He is oriented to person, place, and time  He appears well-developed and well-nourished  HENT:   Head: Normocephalic and atraumatic  Eyes: Conjunctivae are normal    Abdominal: A hernia is present  Hernia confirmed positive in the left inguinal area  Neurological: He is alert and oriented to person, place, and time  Skin: Skin is warm and dry  He is not diaphoretic  Psychiatric: He has a normal mood and affect  Easily reducible        Should not hold up the anticipated prostate work / treatment

## 2019-12-12 NOTE — TELEPHONE ENCOUNTER
Patient is calling to be seen for a possible  Hernia   He is in pain please review no open spots thank you

## 2019-12-12 NOTE — PATIENT INSTRUCTIONS
HERE IS ALIST OF OVER THE COUNTER MEDS THAT MIGHT HELP YOUR COLD:     AFRIN NASAL SPRAY FOR NOSE BLOCKAGE / SINUS PAIN: USE FOR ONLY 5 DAYS TO AVOID GETTING USED TO IT    ROBITUSSIN DM FOR COUGH : DELSYM IS SIMILAR    ACETOMINOPHEN FOR FEVER / ACHES/ SORE THROAT   MUCINEX TO THIN OUT MUCUS

## 2019-12-13 ENCOUNTER — TELEPHONE (OUTPATIENT)
Dept: UROLOGY | Facility: CLINIC | Age: 74
End: 2019-12-13

## 2019-12-13 NOTE — TELEPHONE ENCOUNTER
Pt called stating he has been diagnosed with an inguinal hernia that is causing a lot of discomfort  He does not feel he will be able to wait through 9 weeks of radiation to have hernia surgery to correct it due to the pain he is having  He asked what the urologist's recommendation would be  I told him it was really something he would need to discuss with his radiation oncologist   I explained we are only putting his markers and SpaceOar in and that it is radiation oncology that is handling his radiation treatments  I advised him to speak with his rad onc doc and to follow up with me if he needs to reschedule his SpaceOar placement to coincide with a new sim schedule

## 2019-12-13 NOTE — TELEPHONE ENCOUNTER
Raphael Post that is his decision  He should notify rad onc and set up a new treatment date  Libra Arizmendi will need to be coordinated with rad onc

## 2019-12-13 NOTE — TELEPHONE ENCOUNTER
Dr Louann Rivas   Patient wishes to postpone fiducial markers and Spaceoar scheduled for 12/19/2019  Wishes to have hernia repair first   Please advise  Left message for patient office will call him back

## 2019-12-13 NOTE — TELEPHONE ENCOUNTER
Pt called asking if clinical could speak with Dr Ruth and get his opinion on putting off scheduled 12/19/19 procedure until he has inguinal hernia taken care of first

## 2019-12-14 ENCOUNTER — TREATMENT (OUTPATIENT)
Dept: FAMILY MEDICINE CLINIC | Facility: CLINIC | Age: 74
End: 2019-12-14

## 2019-12-14 DIAGNOSIS — J40 BRONCHITIS: Primary | ICD-10-CM

## 2019-12-14 DIAGNOSIS — K40.90 NON-RECURRENT UNILATERAL INGUINAL HERNIA WITHOUT OBSTRUCTION OR GANGRENE: Chronic | ICD-10-CM

## 2019-12-14 RX ORDER — GUAIFENESIN AND CODEINE PHOSPHATE 100; 10 MG/5ML; MG/5ML
SOLUTION ORAL
Qty: 120 ML | Refills: 2 | Status: SHIPPED | OUTPATIENT
Start: 2019-12-14 | End: 2020-01-30

## 2019-12-16 ENCOUNTER — TELEPHONE (OUTPATIENT)
Dept: UROLOGY | Facility: AMBULATORY SURGERY CENTER | Age: 74
End: 2019-12-16

## 2019-12-16 PROBLEM — K40.90 NON-RECURRENT UNILATERAL INGUINAL HERNIA WITHOUT OBSTRUCTION OR GANGRENE: Status: ACTIVE | Noted: 2019-12-16

## 2019-12-16 NOTE — TELEPHONE ENCOUNTER
----- Message from [employer] sent at 12/16/2019  9:43 AM EST -----  Regarding: space oar/markers/sim rescheduled  Contact: [phone]  Mr Anu Lee called this morning stating his space oar and markers date was changed to 2/3/20  I rescheduled his simulation to 2/11/10

## 2019-12-18 NOTE — H&P (VIEW-ONLY)
Assessment/Plan:    Diagnoses and all orders for this visit:    Left inguinal hernia    Risks and benefits of a left inguinal hernia repair including the potential for spermatic cord injury, recurrence, or chronic pain issues were discussed with him and he agrees to proceed  Subjective:      Patient ID: Morelia Carter is a 75 y o  male  Patient presents for left inguinal hernia consult  States he has had a bulge and achy, dull, sharp pain LLQ for one week  Limits his activities  Has recently been getting over upper respiratory infections  With his coughing episodes he has noticed the development of a left inguinal hernia  The following portions of the patient's history were reviewed and updated as appropriate:     He  has a past medical history of Cancer ([facility] [address] ), Elevated PSA, Hyperlipidemia, Narcolepsy, Pacemaker (1988), and Prostate cancer ([facility] [address] )  He  has a past surgical history that includes Atrial cardiac pacemaker insertion; Cardiac catheterization (02/12/2015); Skin cancer excision; and Colonoscopy  His family history includes Cancer in his father; Coronary artery disease in his brother, father, and mother; Hyperlipidemia in his father; Skin cancer in his father  He  reports that he has never smoked  He has never used smokeless tobacco  He reports that he drinks about 7 0 standard drinks of alcohol per week  He reports that he does not use drugs    Current Outpatient Medications   Medication Sig Dispense Refill    atorvastatin (LIPITOR) 20 mg tablet Take 1 tablet (20 mg total) by mouth daily (Patient taking differently: Take 20 mg by mouth daily in the early morning ) 90 tablet 3    cholecalciferol (VITAMIN D3) 1,000 units tablet Take 1,000 Units by mouth daily      guaifenesin-codeine (GUAIFENESIN AC) 100-10 MG/5ML liquid 1-2 TSP Q6HR PRN COUGH 120 mL 2    modafinil (PROVIGIL) 200 MG tablet Take 200 mg by mouth daily      sildenafil (REVATIO) 20 mg tablet Take 3 to 5 tablets one (1) hour prior to intercourse  Take no more than 5 tablets daily  90 tablet 3     No current facility-administered medications for this visit  He has No Known Allergies       Review of Systems      Objective:      /73 (BP Location: Left arm, Patient Position: Sitting)   Pulse 60   Resp 12   Ht 5' 8" (1 727 m)   Wt 68 kg (150 lb)   BMI 22 81 kg/m²          Physical Exam   Constitutional: He appears well-developed and well-nourished  HENT:   Head: Atraumatic  Eyes: EOM are normal    Neck: Neck supple  Cardiovascular: Normal rate and regular rhythm  Pulmonary/Chest: Effort normal and breath sounds normal    Abdominal: Soft  Bowel sounds are normal  A hernia (Reducible left inguinal hernia no signs of a right inguinal hernia nor umbilical hernia ) is present  Skin: Skin is warm and dry

## 2019-12-19 ENCOUNTER — ANESTHESIA (OUTPATIENT)
Dept: PERIOP | Facility: HOSPITAL | Age: 74
End: 2019-12-19
Payer: MEDICARE

## 2019-12-19 ENCOUNTER — ANESTHESIA EVENT (OUTPATIENT)
Dept: PERIOP | Facility: HOSPITAL | Age: 74
End: 2019-12-19
Payer: MEDICARE

## 2019-12-20 ENCOUNTER — ANESTHESIA (OUTPATIENT)
Dept: PERIOP | Facility: HOSPITAL | Age: 74
End: 2019-12-20
Payer: MEDICARE

## 2019-12-20 ENCOUNTER — HOSPITAL ENCOUNTER (OUTPATIENT)
Facility: HOSPITAL | Age: 74
Setting detail: OUTPATIENT SURGERY
Discharge: HOME | End: 2019-12-20
Attending: SURGERY | Admitting: SURGERY
Payer: MEDICARE

## 2019-12-20 VITALS
TEMPERATURE: 97.8 F | HEART RATE: 64 BPM | SYSTOLIC BLOOD PRESSURE: 146 MMHG | OXYGEN SATURATION: 99 % | WEIGHT: 150 LBS | HEIGHT: 68 IN | RESPIRATION RATE: 18 BRPM | BODY MASS INDEX: 22.73 KG/M2 | DIASTOLIC BLOOD PRESSURE: 65 MMHG

## 2019-12-20 DIAGNOSIS — K40.90 NON-RECURRENT UNILATERAL INGUINAL HERNIA WITHOUT OBSTRUCTION OR GANGRENE: Primary | ICD-10-CM

## 2019-12-20 PROCEDURE — 49505 PRP I/HERN INIT REDUC >5 YR: CPT | Performed by: SURGERY

## 2019-12-20 PROCEDURE — C1781 MESH (IMPLANTABLE): HCPCS | Performed by: SURGERY

## 2019-12-20 DEVICE — MODIFIED ONFLEX MESH
Type: IMPLANTABLE DEVICE | Status: FUNCTIONAL
Brand: MODIFIED ONFLEX MESH

## 2019-12-20 RX ORDER — FENTANYL CITRATE/PF 50 MCG/ML
25 SYRINGE (ML) INJECTION
Status: DISCONTINUED | OUTPATIENT
Start: 2019-12-20 | End: 2019-12-20 | Stop reason: HOSPADM

## 2019-12-20 RX ORDER — EPHEDRINE SULFATE 50 MG/ML
INJECTION INTRAVENOUS AS NEEDED
Status: DISCONTINUED | OUTPATIENT
Start: 2019-12-20 | End: 2019-12-20 | Stop reason: SURG

## 2019-12-20 RX ORDER — OXYCODONE HYDROCHLORIDE AND ACETAMINOPHEN 5; 325 MG/1; MG/1
1 TABLET ORAL EVERY 4 HOURS PRN
Status: DISCONTINUED | OUTPATIENT
Start: 2019-12-20 | End: 2019-12-20 | Stop reason: HOSPADM

## 2019-12-20 RX ORDER — LIDOCAINE HYDROCHLORIDE 10 MG/ML
0.5 INJECTION, SOLUTION EPIDURAL; INFILTRATION; INTRACAUDAL; PERINEURAL ONCE AS NEEDED
Status: DISCONTINUED | OUTPATIENT
Start: 2019-12-20 | End: 2019-12-20 | Stop reason: HOSPADM

## 2019-12-20 RX ORDER — ONDANSETRON 2 MG/ML
4 INJECTION INTRAMUSCULAR; INTRAVENOUS EVERY 4 HOURS PRN
Status: DISCONTINUED | OUTPATIENT
Start: 2019-12-20 | End: 2019-12-20 | Stop reason: HOSPADM

## 2019-12-20 RX ORDER — PROPOFOL 10 MG/ML
INJECTION, EMULSION INTRAVENOUS AS NEEDED
Status: DISCONTINUED | OUTPATIENT
Start: 2019-12-20 | End: 2019-12-20 | Stop reason: SURG

## 2019-12-20 RX ORDER — MAGNESIUM HYDROXIDE 1200 MG/15ML
LIQUID ORAL AS NEEDED
Status: DISCONTINUED | OUTPATIENT
Start: 2019-12-20 | End: 2019-12-20 | Stop reason: HOSPADM

## 2019-12-20 RX ORDER — MORPHINE SULFATE 10 MG/ML
4 INJECTION, SOLUTION INTRAMUSCULAR; INTRAVENOUS
Status: DISCONTINUED | OUTPATIENT
Start: 2019-12-20 | End: 2019-12-20 | Stop reason: HOSPADM

## 2019-12-20 RX ORDER — ONDANSETRON 2 MG/ML
INJECTION INTRAMUSCULAR; INTRAVENOUS AS NEEDED
Status: DISCONTINUED | OUTPATIENT
Start: 2019-12-20 | End: 2019-12-20 | Stop reason: SURG

## 2019-12-20 RX ORDER — OXYCODONE HYDROCHLORIDE 5 MG/1
5 TABLET ORAL EVERY 4 HOURS PRN
Qty: 10 TABLET | Refills: 0 | Status: SHIPPED | OUTPATIENT
Start: 2019-12-20 | End: 2019-12-30

## 2019-12-20 RX ORDER — FENTANYL CITRATE 50 UG/ML
INJECTION, SOLUTION INTRAMUSCULAR; INTRAVENOUS AS NEEDED
Status: DISCONTINUED | OUTPATIENT
Start: 2019-12-20 | End: 2019-12-20 | Stop reason: SURG

## 2019-12-20 RX ORDER — SODIUM CHLORIDE, SODIUM LACTATE, POTASSIUM CHLORIDE, CALCIUM CHLORIDE 600; 310; 30; 20 MG/100ML; MG/100ML; MG/100ML; MG/100ML
125 INJECTION, SOLUTION INTRAVENOUS CONTINUOUS
Status: DISCONTINUED | OUTPATIENT
Start: 2019-12-20 | End: 2019-12-20 | Stop reason: HOSPADM

## 2019-12-20 RX ORDER — DEXAMETHASONE SODIUM PHOSPHATE 10 MG/ML
INJECTION, SOLUTION INTRAMUSCULAR; INTRAVENOUS AS NEEDED
Status: DISCONTINUED | OUTPATIENT
Start: 2019-12-20 | End: 2019-12-20 | Stop reason: SURG

## 2019-12-20 RX ORDER — BUPIVACAINE HYDROCHLORIDE 2.5 MG/ML
INJECTION, SOLUTION EPIDURAL; INFILTRATION; INTRACAUDAL AS NEEDED
Status: DISCONTINUED | OUTPATIENT
Start: 2019-12-20 | End: 2019-12-20 | Stop reason: HOSPADM

## 2019-12-20 RX ORDER — LIDOCAINE HYDROCHLORIDE 10 MG/ML
INJECTION, SOLUTION EPIDURAL; INFILTRATION; INTRACAUDAL; PERINEURAL AS NEEDED
Status: DISCONTINUED | OUTPATIENT
Start: 2019-12-20 | End: 2019-12-20 | Stop reason: SURG

## 2019-12-20 RX ORDER — ONDANSETRON 2 MG/ML
4 INJECTION INTRAMUSCULAR; INTRAVENOUS ONCE AS NEEDED
Status: DISCONTINUED | OUTPATIENT
Start: 2019-12-20 | End: 2019-12-20 | Stop reason: HOSPADM

## 2019-12-20 RX ORDER — CEFAZOLIN SODIUM 1 G/50ML
1000 SOLUTION INTRAVENOUS ONCE
Status: COMPLETED | OUTPATIENT
Start: 2019-12-20 | End: 2019-12-20

## 2019-12-20 RX ORDER — GLYCOPYRROLATE 0.2 MG/ML
INJECTION INTRAMUSCULAR; INTRAVENOUS AS NEEDED
Status: DISCONTINUED | OUTPATIENT
Start: 2019-12-20 | End: 2019-12-20 | Stop reason: SURG

## 2019-12-20 RX ADMIN — EPHEDRINE SULFATE 5 MG: 50 INJECTION, SOLUTION INTRAVENOUS at 14:26

## 2019-12-20 RX ADMIN — ONDANSETRON 4 MG: 2 INJECTION INTRAMUSCULAR; INTRAVENOUS at 14:55

## 2019-12-20 RX ADMIN — CEFAZOLIN SODIUM 1000 MG: 1 SOLUTION INTRAVENOUS at 14:03

## 2019-12-20 RX ADMIN — FENTANYL CITRATE 25 MCG: 50 INJECTION INTRAMUSCULAR; INTRAVENOUS at 14:42

## 2019-12-20 RX ADMIN — DEXAMETHASONE SODIUM PHOSPHATE 4 MG: 10 INJECTION, SOLUTION INTRAMUSCULAR; INTRAVENOUS at 14:15

## 2019-12-20 RX ADMIN — SODIUM CHLORIDE, SODIUM LACTATE, POTASSIUM CHLORIDE, AND CALCIUM CHLORIDE: .6; .31; .03; .02 INJECTION, SOLUTION INTRAVENOUS at 13:54

## 2019-12-20 RX ADMIN — PROPOFOL 200 MG: 10 INJECTION, EMULSION INTRAVENOUS at 14:15

## 2019-12-20 RX ADMIN — LIDOCAINE HYDROCHLORIDE 25 MG: 10 INJECTION, SOLUTION EPIDURAL; INFILTRATION; INTRACAUDAL; PERINEURAL at 14:15

## 2019-12-20 RX ADMIN — FENTANYL CITRATE 25 MCG: 50 INJECTION INTRAMUSCULAR; INTRAVENOUS at 14:22

## 2019-12-20 RX ADMIN — GLYCOPYRROLATE 0.2 MG: 0.2 INJECTION, SOLUTION INTRAMUSCULAR; INTRAVENOUS at 14:15

## 2019-12-20 RX ADMIN — FENTANYL CITRATE 50 MCG: 50 INJECTION INTRAMUSCULAR; INTRAVENOUS at 14:19

## 2019-12-20 NOTE — OP NOTE
OPERATIVE REPORT  PATIENT NAME: Marybel Stratton    :  1945  MRN: [id number]  Pt Location: AN OR ROOM 03    SURGERY DATE: 2019    Surgeon(s) and Role:     * Peggy Marino, DO - Primary     * Samuel Carrasco MD - Assisting    Preop Diagnosis:  Non-recurrent unilateral inguinal hernia without obstruction or gangrene [K40 90]    Post-Op Diagnosis Codes:     * Non-recurrent unilateral inguinal hernia without obstruction or gangrene [K40 90]    Procedure(s) (LRB):  INGUINAL HERNIA REPAIR (Left) with medium on flex mesh    Specimen(s):  * No specimens in log *    Estimated Blood Loss:   Minimal    Drains:  * No LDAs found *    Anesthesia Type:   General/local    Operative Indications:  Non-recurrent unilateral inguinal hernia without obstruction or gangrene [K40 90]      Operative Findings:  Left indirect inguinal hernia  Height 68 inches weight 68 kg/150 lb  BMI 23  ASA 2  Wound class 1  Cardiovascular  Hyperlipidemia,     Pulmonary         GI/Hepatic                Endo/Other       GYN         Hematology    Musculoskeletal         Neurology    Psychology             Complications:   None    Procedure and Technique:  Patient was brought in the operative suite and identified by visualization, conversation, by armband  Sequential compression pumps were placed  He was given Ancef perioperatively  Once under anesthesia abdomen and left inguinal region was then prepped and draped in a sterile fashion  Time-out was performed and was assured that the prep was dry  Local was instilled over the left inguinal canal   Oblique skin incision was made in the subcutaneous tissues were divided with hot cautery down to the external oblique fascia  This fascia was opened up through the external ring to level the internal ring  Radha retractors then placed for exposure  Ilioinguinal nerve went directly across the spermatic cord structures and therefore sacrificed with Metzenbaum scissors    Cremasteric fibers were skeletonized off the cord structures  Cord structures of the hernia sac were then encircled the Penrose drain for control  Careful dissection was carried out in a high fashion up to the internal ring dissect out the hernia sac  This was reduced and the space was developed with a 4 x 4 sponge  A medium on flex mesh was chosen  I did trim part of the wider aspect of the mesh to allow adequate placement into the indirect hernia  This was placed in the preperitoneal space and spread out  Tails were anchored each side of the transversalis tissues with 2 0 Vicryl  [place] mesh was then placed on the floor the canal anchored 2 Vicryl to the pubic tubercle shelving edge in co joint tendon  I extended a cut from the superior aspect of the mesh down to the level of the cord structures  The resultant 2 tails were then brought around the cord and anchored to themselves as well as the underlying transversalis tissues with 2 0 Vicryl  Tails were trimmed and tucked under the external oblique fascia  Irrigation was carried out  Local was instilled  External oblique fascia was reapproximated top of the cord with a running 2 0 Vicryl suture  Irrigation was carried out yet again  Garfield's was reapproximated in simple 2 0 Vicryl  Skin was closing 4 Monocryl in a subcuticular fashion  Wound was washed and dried  Sterile skin glue was applied  It was assured that the testicles in the scrotum at the completion the case  He was awakened in the operating room and returned to the recovery area in stable condition having tolerated the procedure well     I was present for the entire procedure    Patient Disposition:  PACU     SIGNATURE: Derrick Jade DO  DATE: December 20, 2019  TIME: 2:55 PM

## 2019-12-20 NOTE — ANESTHESIA PREPROCEDURE EVALUATION
Review of Systems/Medical History          Cardiovascular  Hyperlipidemia,    Pulmonary       GI/Hepatic            Endo/Other     GYN       Hematology   Musculoskeletal       Neurology   Psychology           Physical Exam    Airway    Mallampati score: II         Dental   No notable dental hx     Cardiovascular      Pulmonary      Other Findings        Anesthesia Plan  ASA Score- 2     Anesthesia Type- general with ASA Monitors  Additional Monitors:   Airway Plan: LMA  Comment: I, Dr Earl Canales, the attending physician, have personally seen and evaluated the patient prior to anesthetic care  I have reviewed the pre-anesthetic record, and other medical records if appropriate to the anesthetic care  If a CRNA is involved in the case, I have reviewed the CRNA assessment, if present, and agree  The patient is in a suitable condition to proceed with my formulated anesthetic plan        Plan Factors-    Induction- intravenous  Postoperative Plan-     Informed Consent- Anesthetic plan and risks discussed with patient  I personally reviewed this patient with the CRNA  Discussed and agreed on the Anesthesia Plan with the CRNA  Jay Gannon

## 2019-12-20 NOTE — DISCHARGE INSTRUCTIONS
Please call the office when you leave to schedule an appointment to be seen in 2-3 weeks    Activity:    Do not lift more than 25 pounds (a gallon of milk) for 4 weeks post-operatively                 Walking is encouraged  Normal daily activities including climbing steps are okay  Do not engage in strenuous activity or contact sports for 4-6 weeks post-operatively    Return to work:   Return to work to be discussed at first post-operative visit    Diet:   You may return to your normal heart healthy diet    Wound Care: Your wound is closed with skin glue  It is okay to shower  Wash incision gently with soap and water and pat dry  Do not soak incisions in bath water or swim for two weeks  Do not apply any creams or ointments  Apply ice as needed to wound    Pain Medication:   Please take as directed  No driving while taking narcotic pain medications    Other:  If you have questions after discharge please call the office      If you have increased pain, fever >101 5, increased drainage, redness or a bad smell at your surgery site, please call us immediately or come directly to the Emergency Room

## 2019-12-20 NOTE — INTERVAL H&P NOTE
H&P reviewed  After examining the patient I find no changes in the patients condition since the H&P had been written      Vitals:    12/20/19 1343   BP: 159/71   Pulse: 60   Resp: 18   Temp: (!) 97 3 °F (36 3 °C)   SpO2: 98%

## 2019-12-20 NOTE — ANESTHESIA POSTPROCEDURE EVALUATION
Post-Op Assessment Note    CV Status:  Stable  Pain Score: 0    Pain management: adequate     Mental Status:  Sleepy   Hydration Status:  Stable   PONV Controlled:  None   Airway Patency:  Patent   Post Op Vitals Reviewed: Yes      Staff: CRNA   Comments: 6 L FM          BP      Temp     Pulse     Resp      SpO2

## 2020-01-20 DIAGNOSIS — Z01.812 PRE-OPERATIVE LABORATORY EXAMINATION: ICD-10-CM

## 2020-01-20 DIAGNOSIS — C61 PROSTATE CANCER (HCC): Primary | ICD-10-CM

## 2020-01-20 DIAGNOSIS — R39.89 SUSPECTED UTI: ICD-10-CM

## 2020-01-21 ENCOUNTER — TELEPHONE (OUTPATIENT)
Dept: RADIATION ONCOLOGY | Facility: CLINIC | Age: 75
End: 2020-01-21

## 2020-01-21 NOTE — TELEPHONE ENCOUNTER
Spoke with the pt directly  He had questions if he could have time after his simulation to get away for a long weekend before he starts treatment  He was wondering how much time is needed for the treatment planning  I explained about the time needed for tx planning, and the approx start date  He is recovering well from hernia surgery  I reminded him of the pre simulation instruction  He verbalized he understood   NK

## 2020-01-21 NOTE — TELEPHONE ENCOUNTER
----- Message from Giuseppe Cummings sent at 1/20/2020  1:58 PM EST -----  Hello,    Patient called specifically asked for Jocelyn "would like to discuss radiation treatments" Please call patient at [phone]      Thank you,    Jolly Spine

## 2020-01-22 ENCOUNTER — APPOINTMENT (OUTPATIENT)
Dept: LAB | Facility: HOSPITAL | Age: 75
End: 2020-01-22
Attending: UROLOGY
Payer: MEDICARE

## 2020-01-22 DIAGNOSIS — Z01.812 PRE-OPERATIVE LABORATORY EXAMINATION: ICD-10-CM

## 2020-01-22 DIAGNOSIS — R39.89 SUSPECTED UTI: ICD-10-CM

## 2020-01-22 DIAGNOSIS — C61 PROSTATE CANCER (HCC): ICD-10-CM

## 2020-01-22 LAB
ALBUMIN SERPL BCP-MCNC: 3.6 G/DL (ref 3.5–5)
ALP SERPL-CCNC: 71 U/L (ref 46–116)
ALT SERPL W P-5'-P-CCNC: 34 U/L (ref 12–78)
ANION GAP SERPL CALCULATED.3IONS-SCNC: 5 MMOL/L (ref 4–13)
AST SERPL W P-5'-P-CCNC: 31 U/L (ref 5–45)
BASOPHILS # BLD AUTO: 0.04 THOUSANDS/ΜL (ref 0–0.1)
BASOPHILS NFR BLD AUTO: 1 % (ref 0–1)
BILIRUB SERPL-MCNC: 0.43 MG/DL (ref 0.2–1)
BUN SERPL-MCNC: 15 MG/DL (ref 5–25)
CALCIUM SERPL-MCNC: 8.8 MG/DL (ref 8.3–10.1)
CHLORIDE SERPL-SCNC: 104 MMOL/L (ref 100–108)
CO2 SERPL-SCNC: 33 MMOL/L (ref 21–32)
CREAT SERPL-MCNC: 0.96 MG/DL (ref 0.6–1.3)
EOSINOPHIL # BLD AUTO: 0.23 THOUSAND/ΜL (ref 0–0.61)
EOSINOPHIL NFR BLD AUTO: 5 % (ref 0–6)
ERYTHROCYTE [DISTWIDTH] IN BLOOD BY AUTOMATED COUNT: 12.8 % (ref 11.6–15.1)
GFR SERPL CREATININE-BSD FRML MDRD: 77 ML/MIN/1.73SQ M
GLUCOSE P FAST SERPL-MCNC: 100 MG/DL (ref 65–99)
HCT VFR BLD AUTO: 41.8 % (ref 36.5–49.3)
HGB BLD-MCNC: 13.7 G/DL (ref 12–17)
IMM GRANULOCYTES # BLD AUTO: 0.01 THOUSAND/UL (ref 0–0.2)
IMM GRANULOCYTES NFR BLD AUTO: 0 % (ref 0–2)
LYMPHOCYTES # BLD AUTO: 1.14 THOUSANDS/ΜL (ref 0.6–4.47)
LYMPHOCYTES NFR BLD AUTO: 24 % (ref 14–44)
MCH RBC QN AUTO: 32.2 PG (ref 26.8–34.3)
MCHC RBC AUTO-ENTMCNC: 32.8 G/DL (ref 31.4–37.4)
MCV RBC AUTO: 98 FL (ref 82–98)
MONOCYTES # BLD AUTO: 0.52 THOUSAND/ΜL (ref 0.17–1.22)
MONOCYTES NFR BLD AUTO: 11 % (ref 4–12)
NEUTROPHILS # BLD AUTO: 2.86 THOUSANDS/ΜL (ref 1.85–7.62)
NEUTS SEG NFR BLD AUTO: 59 % (ref 43–75)
NRBC BLD AUTO-RTO: 0 /100 WBCS
PLATELET # BLD AUTO: 183 THOUSANDS/UL (ref 149–390)
PMV BLD AUTO: 9.9 FL (ref 8.9–12.7)
POTASSIUM SERPL-SCNC: 4.5 MMOL/L (ref 3.5–5.3)
PROT SERPL-MCNC: 6.7 G/DL (ref 6.4–8.2)
RBC # BLD AUTO: 4.26 MILLION/UL (ref 3.88–5.62)
SODIUM SERPL-SCNC: 142 MMOL/L (ref 136–145)
WBC # BLD AUTO: 4.8 THOUSAND/UL (ref 4.31–10.16)

## 2020-01-22 PROCEDURE — 80053 COMPREHEN METABOLIC PANEL: CPT

## 2020-01-22 PROCEDURE — 36415 COLL VENOUS BLD VENIPUNCTURE: CPT

## 2020-01-22 PROCEDURE — 87086 URINE CULTURE/COLONY COUNT: CPT

## 2020-01-22 PROCEDURE — 85025 COMPLETE CBC W/AUTO DIFF WBC: CPT

## 2020-01-23 LAB — BACTERIA UR CULT: NORMAL

## 2020-01-30 NOTE — PRE-PROCEDURE INSTRUCTIONS
Pre-Surgery Instructions:   Medication Instructions    atorvastatin (LIPITOR) 20 mg tablet Instructed patient per Anesthesia Guidelines   cholecalciferol (VITAMIN D3) 1,000 units tablet Instructed patient per Anesthesia Guidelines   modafinil (PROVIGIL) 200 MG tablet Instructed patient per Anesthesia Guidelines   sildenafil (REVATIO) 20 mg tablet Instructed patient per Anesthesia Guidelines  Spoke to pt  Medication list reviewed & instructed   As of 1/30 pt already stopped vit D  Instructed on tylenol only   Am DOS pt ok to take atorvastatin with 1-2 sips of water   Advised no alcohol 24 hour prior  All instructions verbally understood by patient  No further questions

## 2020-01-31 PROCEDURE — NC001 PR NO CHARGE: Performed by: UROLOGY

## 2020-01-31 NOTE — H&P
HISTORY AND PHYSICAL  ? ? Patient Name: Abebe Baron  Patient MRN: [id number]  Attending Provider: Lety Chaidez MD  Service: Urology  Chief Complaint    Prostate cancer    HPI   Abebe Baron is a 75 y o  male with prostate cancer  I plan fiducial in [facility] and [place]   Potential risks and complications discussed, and informed consent was given by the patient  Medications  Meds/Allergies     No current facility-administered medications for this encounter  Cannot display prior to admission medications because the patient has not been admitted in this contact  No current facility-administered medications for this encounter  Current Outpatient Medications:     atorvastatin (LIPITOR) 20 mg tablet, Take 1 tablet (20 mg total) by mouth daily (Patient taking differently: Take 20 mg by mouth daily in the early morning ), Disp: 90 tablet, Rfl: 3    cholecalciferol (VITAMIN D3) 1,000 units tablet, Take 1,000 Units by mouth daily, Disp: , Rfl:     modafinil (PROVIGIL) 200 MG tablet, Take 200 mg by mouth daily, Disp: , Rfl:     sildenafil (REVATIO) 20 mg tablet, Take 3 to 5 tablets one (1) hour prior to intercourse  Take no more than 5 tablets daily  , Disp: 90 tablet, Rfl: 3  Review of Systems  10 point review of systems negative except as noted in HPI  Allergies  No Known Allergies  PMH  Past Medical History:   Diagnosis Date    Cancer ([facility] [address] )     skin    Elevated PSA     Hyperlipidemia     Narcolepsy     Pacemaker 1988    Symptomatic Bradycardia    Prostate cancer [facility])      Past surgical history  Past Surgical History:   Procedure Laterality Date    ATRIAL CARDIAC PACEMAKER INSERTION      CARDIAC CATHETERIZATION  02/12/2015    COLONOSCOPY      [place] REPAIR ING HERNIA,5+Y/O,REDUCIBL Left 12/20/2019    Procedure: INGUINAL HERNIA REPAIR;  Surgeon: Germain Driscoll DO;  [facility];  Service: General    SKIN CANCER EXCISION      left cheek     Social history  Social History Tobacco Use    Smoking status: Never Smoker    Smokeless tobacco: Never Used   Substance Use Topics    Alcohol use: Yes     Alcohol/week: 7 0 standard drinks     Types: 7 Glasses of wine per week     Frequency: 4 or more times a week     Comment: 1 glass beer/wine daily    Drug use: No     ?  Physical Exam      Ht 5' 8" (1 727 m)   Wt 68 kg (150 lb)   BMI 22 81 kg/m²   General appearance: alert and oriented, in no acute distress  Head: Normocephalic, without obvious abnormality, atraumatic  Neck: supple, symmetrical, trachea midline  Back: symmetric, no curvature  ROM normal  No CVA tenderness    Lungs: clear to auscultation bilaterally  Heart: regular rate and rhythm  Abdomen: soft, non-tender; bowel sounds normal; no masses,  no organomegaly  Male genitalia: normal  Extremities: extremities normal, warm and well-perfused; no cyanosis, clubbing, or edema  Neurologic: Grossly normal     Berenice Soto MD

## 2020-02-02 NOTE — ANESTHESIA PREPROCEDURE EVALUATION
Review of Systems/Medical History  Patient summary reviewed  Chart reviewed      Cardiovascular  Negative cardio ROS Exercise tolerance (METS): >4,  Pacemaker/AICD, Hyperlipidemia, Dysrhythmias ,   Comment: Patient with history of symptomatic bradycardia with pacemaker - Atrial paced 14% of the time on last pacer check,  Pulmonary  Negative pulmonary ROS        GI/Hepatic  Negative GI/hepatic ROS          Negative  ROS        Endo/Other  Negative endo/other ROS      GYN  Negative gynecology ROS          Hematology  Negative hematology ROS      Musculoskeletal  Negative musculoskeletal ROS        Neurology  Negative neurology ROS      Psychology   Negative psychology ROS              Physical Exam    Airway    Mallampati score: II  TM Distance: >3 FB  Neck ROM: full     Dental   No notable dental hx     Cardiovascular  Comment: Negative ROS, Rhythm: regular, Rate: normal, Cardiovascular exam normal    Pulmonary  Pulmonary exam normal Breath sounds clear to auscultation,     Other Findings        Anesthesia Plan  ASA Score- 2     Anesthesia Type- general with ASA Monitors  Additional Monitors:   Airway Plan: LMA  Comment: Risks/benefits and alternatives discussed with patient including possible PONV, sore throat, and possibility of rare anesthetic and surgical emergencies        Plan Factors- Patient instructed to abstain from smoking on day of procedure  Patient did not smoke on day of surgery  Induction- intravenous  Postoperative Plan- Plan for postoperative opioid use  Planned trial extubation    Informed Consent- Anesthetic plan and risks discussed with patient  I personally reviewed this patient with the CRNA  Discussed and agreed on the Anesthesia Plan with the CRNA  Ceasar Orellana

## 2020-02-03 ENCOUNTER — HOSPITAL ENCOUNTER (OUTPATIENT)
Facility: HOSPITAL | Age: 75
Setting detail: OUTPATIENT SURGERY
Discharge: HOME | End: 2020-02-03
Attending: UROLOGY | Admitting: UROLOGY
Payer: MEDICARE

## 2020-02-03 VITALS
OXYGEN SATURATION: 100 % | SYSTOLIC BLOOD PRESSURE: 156 MMHG | RESPIRATION RATE: 18 BRPM | DIASTOLIC BLOOD PRESSURE: 72 MMHG | BODY MASS INDEX: 22.73 KG/M2 | HEART RATE: 57 BPM | TEMPERATURE: 97.3 F | WEIGHT: 150 LBS | HEIGHT: 68 IN

## 2020-02-03 DIAGNOSIS — C61 PROSTATE CANCER (HCC): Primary | ICD-10-CM

## 2020-02-03 PROCEDURE — 55874 TPRNL PLMT BIODEGRDABL MATRL: CPT | Performed by: UROLOGY

## 2020-02-03 PROCEDURE — 55876 PLACE RT DEVICE/MARKER PROS: CPT | Performed by: UROLOGY

## 2020-02-03 PROCEDURE — A4648 IMPLANTABLE TISSUE MARKER: HCPCS | Performed by: UROLOGY

## 2020-02-03 PROCEDURE — NC001 PR NO CHARGE: Performed by: UROLOGY

## 2020-02-03 DEVICE — STERILE PLACEMENT NEEDLES (17GA ETW X 20CM) WITH BONE WAX AND (1.2 X 3MM) SOFT TISSUE GOLD MARKER [3]
Type: IMPLANTABLE DEVICE | Site: PROSTATE | Status: FUNCTIONAL
Brand: FIDUCIAL MARKER KIT

## 2020-02-03 RX ORDER — SODIUM CHLORIDE 9 MG/ML
125 INJECTION, SOLUTION INTRAVENOUS CONTINUOUS
Status: DISCONTINUED | OUTPATIENT
Start: 2020-02-03 | End: 2020-02-03 | Stop reason: HOSPADM

## 2020-02-03 RX ORDER — ACETAMINOPHEN 325 MG/1
650 TABLET ORAL EVERY 6 HOURS PRN
Status: DISCONTINUED | OUTPATIENT
Start: 2020-02-03 | End: 2020-02-03 | Stop reason: HOSPADM

## 2020-02-03 RX ORDER — EPHEDRINE SULFATE 50 MG/ML
INJECTION INTRAVENOUS AS NEEDED
Status: DISCONTINUED | OUTPATIENT
Start: 2020-02-03 | End: 2020-02-03 | Stop reason: SURG

## 2020-02-03 RX ORDER — ONDANSETRON 2 MG/ML
4 INJECTION INTRAMUSCULAR; INTRAVENOUS EVERY 6 HOURS PRN
Status: DISCONTINUED | OUTPATIENT
Start: 2020-02-03 | End: 2020-02-03 | Stop reason: HOSPADM

## 2020-02-03 RX ORDER — ONDANSETRON 2 MG/ML
4 INJECTION INTRAMUSCULAR; INTRAVENOUS ONCE AS NEEDED
Status: DISCONTINUED | OUTPATIENT
Start: 2020-02-03 | End: 2020-02-03 | Stop reason: HOSPADM

## 2020-02-03 RX ORDER — PROPOFOL 10 MG/ML
INJECTION, EMULSION INTRAVENOUS AS NEEDED
Status: DISCONTINUED | OUTPATIENT
Start: 2020-02-03 | End: 2020-02-03 | Stop reason: SURG

## 2020-02-03 RX ORDER — ONDANSETRON 2 MG/ML
INJECTION INTRAMUSCULAR; INTRAVENOUS AS NEEDED
Status: DISCONTINUED | OUTPATIENT
Start: 2020-02-03 | End: 2020-02-03 | Stop reason: SURG

## 2020-02-03 RX ORDER — OXYCODONE HYDROCHLORIDE 5 MG/1
5 TABLET ORAL EVERY 4 HOURS PRN
Status: DISCONTINUED | OUTPATIENT
Start: 2020-02-03 | End: 2020-02-03 | Stop reason: HOSPADM

## 2020-02-03 RX ORDER — LIDOCAINE HYDROCHLORIDE 10 MG/ML
0.5 INJECTION, SOLUTION EPIDURAL; INFILTRATION; INTRACAUDAL; PERINEURAL ONCE AS NEEDED
Status: COMPLETED | OUTPATIENT
Start: 2020-02-03 | End: 2020-02-03

## 2020-02-03 RX ORDER — DEXAMETHASONE SODIUM PHOSPHATE 10 MG/ML
INJECTION, SOLUTION INTRAMUSCULAR; INTRAVENOUS AS NEEDED
Status: DISCONTINUED | OUTPATIENT
Start: 2020-02-03 | End: 2020-02-03 | Stop reason: SURG

## 2020-02-03 RX ORDER — LIDOCAINE HYDROCHLORIDE 10 MG/ML
INJECTION, SOLUTION EPIDURAL; INFILTRATION; INTRACAUDAL; PERINEURAL AS NEEDED
Status: DISCONTINUED | OUTPATIENT
Start: 2020-02-03 | End: 2020-02-03 | Stop reason: SURG

## 2020-02-03 RX ORDER — FENTANYL CITRATE/PF 50 MCG/ML
25 SYRINGE (ML) INJECTION
Status: DISCONTINUED | OUTPATIENT
Start: 2020-02-03 | End: 2020-02-03 | Stop reason: HOSPADM

## 2020-02-03 RX ORDER — SODIUM CHLORIDE 9 MG/ML
INJECTION INTRAVENOUS AS NEEDED
Status: DISCONTINUED | OUTPATIENT
Start: 2020-02-03 | End: 2020-02-03 | Stop reason: HOSPADM

## 2020-02-03 RX ORDER — SODIUM CHLORIDE, SODIUM LACTATE, POTASSIUM CHLORIDE, CALCIUM CHLORIDE 600; 310; 30; 20 MG/100ML; MG/100ML; MG/100ML; MG/100ML
50 INJECTION, SOLUTION INTRAVENOUS CONTINUOUS
Status: DISCONTINUED | OUTPATIENT
Start: 2020-02-03 | End: 2020-02-03 | Stop reason: HOSPADM

## 2020-02-03 RX ORDER — KETOROLAC TROMETHAMINE 30 MG/ML
15 INJECTION, SOLUTION INTRAMUSCULAR; INTRAVENOUS EVERY 6 HOURS SCHEDULED
Status: DISCONTINUED | OUTPATIENT
Start: 2020-02-03 | End: 2020-02-03 | Stop reason: HOSPADM

## 2020-02-03 RX ORDER — FENTANYL CITRATE 50 UG/ML
INJECTION, SOLUTION INTRAMUSCULAR; INTRAVENOUS AS NEEDED
Status: DISCONTINUED | OUTPATIENT
Start: 2020-02-03 | End: 2020-02-03 | Stop reason: SURG

## 2020-02-03 RX ADMIN — FENTANYL CITRATE 50 MCG: 50 INJECTION, SOLUTION INTRAMUSCULAR; INTRAVENOUS at 11:22

## 2020-02-03 RX ADMIN — DEXAMETHASONE SODIUM PHOSPHATE 10 MG: 10 INJECTION, SOLUTION INTRAMUSCULAR; INTRAVENOUS at 11:18

## 2020-02-03 RX ADMIN — PROPOFOL 200 MG: 10 INJECTION, EMULSION INTRAVENOUS at 11:10

## 2020-02-03 RX ADMIN — LIDOCAINE HYDROCHLORIDE 50 MG: 10 INJECTION, SOLUTION EPIDURAL; INFILTRATION; INTRACAUDAL; PERINEURAL at 11:10

## 2020-02-03 RX ADMIN — LIDOCAINE HYDROCHLORIDE 0.5 ML: 10 INJECTION, SOLUTION EPIDURAL; INFILTRATION; INTRACAUDAL; PERINEURAL at 08:44

## 2020-02-03 RX ADMIN — SODIUM CHLORIDE 125 ML/HR: 0.9 INJECTION, SOLUTION INTRAVENOUS at 08:44

## 2020-02-03 RX ADMIN — FENTANYL CITRATE 50 MCG: 50 INJECTION, SOLUTION INTRAMUSCULAR; INTRAVENOUS at 11:44

## 2020-02-03 RX ADMIN — EPHEDRINE SULFATE 10 MG: 50 INJECTION, SOLUTION INTRAVENOUS at 11:19

## 2020-02-03 RX ADMIN — CEFTRIAXONE SODIUM 1000 MG: 10 INJECTION, POWDER, FOR SOLUTION INTRAVENOUS at 11:13

## 2020-02-03 RX ADMIN — SODIUM CHLORIDE: 0.9 INJECTION, SOLUTION INTRAVENOUS at 11:03

## 2020-02-03 RX ADMIN — ONDANSETRON 4 MG: 2 INJECTION INTRAMUSCULAR; INTRAVENOUS at 11:18

## 2020-02-03 RX ADMIN — EPHEDRINE SULFATE 10 MG: 50 INJECTION, SOLUTION INTRAVENOUS at 11:33

## 2020-02-03 NOTE — INTERVAL H&P NOTE
H&P reviewed  After examining the patient I find no changes in the patients condition since the H&P had been written  Vitals:    02/03/20 0819   BP: 146/68   Pulse: (!) 54   Resp: 20   Temp: 97 7 °F (36 5 °C)   SpO2: 99%    Procedure reviewed with the patient in the ambulatory unit  Risks were discussed and consent form signed

## 2020-02-03 NOTE — OP NOTE
OPERATIVE REPORT  PATIENT NAME: Sandra Mitchell    :  1945  MRN: [id number]  Pt Location: [facility] ROOM 01    SURGERY DATE: 2/3/2020    Surgeon(s) and Role:     * Aryan Lynn MD - Primary    Preop Diagnosis:  Prostate cancer ([facility] [address] ) Arian Scherer    Post-Op Diagnosis Codes:     * Prostate cancer ([facility] [address] ) Arian Niesha    Procedure(s) (LRB):  INSERTION OF FIDUCIAL MARKER (TRANSRECTAL ULTRASOUND GUIDANCE) SPACEOAR (N/A)    Specimen(s):  * No specimens in log *    Estimated Blood Loss:   Minimal    Drains:  * No LDAs found *    Anesthesia Type:   Choice    Operative Indications:  Prostate cancer ([facility] [address] ) Arian Niesha      Operative Findings:  Uneventful placement of gold fiducials and SpaceOar    Complications:   None    Procedure and Technique:    The patient was brought to the operating room properly identified  General anesthesia was administered  He was placed in lithotomy position prepped and draped in usual sterile fashion  Compression boots were employed  Intravenous antibiotic was administered by Anesthesia  An appropriate time-out was performed  The patient was prepped and draped  The scrotum was taped up to the anterior abdominal wall to allow access to the perineum  Digital rectal examination was then performed  Transrectal ultrasound probe was then placed into the rectum and the prostate visualized  Under ultrasound guidance gold fiducials were then placed into the prostate in the right base, left base and right apex of the gland  SpaceOAR Hydrogel was then prepared as described in the manufacture's instructions for use  With the patient maintained  dorsal lithotomy position, the transrectal ultrasound probe was positioned  to enable visual guidance of the needle into the space between the prostate and the rectum    Under transrectal ultrasound guidance, the 15 cm 18 gauge needle was  inserted through the skin, subcutaneous tissue and rectal urethralis muscle and the needle tip advanced into the perirectal fat inferior to the prostate all by using a transperineal approach and with side fire transrectal ultrasound guidance  The needle position was confirmed in both sagittal and axial fields  Saline was used to dissect the space between Denonvilliers' fascia and the anterior rectal wall  In this way a space was created with hydro dissection  With the needle tip at mid gland, the axial field was used to  confirm the needle was not in the rectal wall  While maintaining  desired position aspiration was done to ensure that the needle was not in a vascular space  The assembled SpaceOAR  delivery system was then attached to the 18 gauge needle  Under ultrasound guidance in the sagittal plane, smooth continuous  injection technique was used to dispense the SpaceOAR  Hydrogel into the space between the prostate and rectum  The entire syringe contents (10 mL) was injected without stopping  Optimal visualization of the needle during Hydrogel administration was maintained at all times  No suspected penetration or compromise of the rectal wall occurred  The patient tolerated procedure well  The needle was removed  The patient was taken out of lithotomy position and awakened from anesthesia and taken to the recovery room in satisfactory condition       I was present for the entire procedure    Patient Disposition:  PACU     SIGNATURE: Edris Primrose, MD  DATE: February 3, 2020  TIME: 1:49 PM

## 2020-02-03 NOTE — DISCHARGE SUMMARY
DISCHARGE SUMMARY     Patient Name: Gaudencio Bazan    Patient MRN: [id number]    Admitting Provider: Valeria Rossi MD    Discharging Provider: Valeria Rossi MD    Primary Care Physician at Discharge: Lisa Thurman [phone]     Admission Date: 2/3/2020     Discharge Date: 2/3/2020    Admission Diagnosis   Prostate cancer [facility]) Nnamdi Abbott    Discharge Diagnoses  Principal Problem:    Prostate cancer [facility])      Medications  Current Discharge Medication List         Current Discharge Medication List      CONTINUE these medications which have NOT CHANGED    Details   atorvastatin (LIPITOR) 20 mg tablet Take 1 tablet (20 mg total) by mouth daily  Qty: 90 tablet, Refills: 3    Associated Diagnoses: Hyperlipidemia      cholecalciferol (VITAMIN D3) 1,000 units tablet Take 1,000 Units by mouth daily      modafinil (PROVIGIL) 200 MG tablet Take 200 mg by mouth daily      sildenafil (REVATIO) 20 mg tablet Take 3 to 5 tablets one (1) hour prior to intercourse  Take no more than 5 tablets daily  Qty: 90 tablet, Refills: 3    Comments: NEW PATIENT - Allergies:  NKDA - Quoted #90 for $74 - Call patient when ready for pickup  Thanks! Associated Diagnoses: Erectile dysfunction, unspecified erectile dysfunction type             Allergies  No Known Allergies    Outpatient Follow-Up  Valeria Rossi MD  [address] [place] [phone]    Follow up  Please keep your follow-up with radiation therapy as well as with Dr Juan Espinoza    ?  Discharge Disposition  Home    Operative Procedures Performed  Procedure(s):  INSERTION OF FIDUCIAL MARKER (TRANSRECTAL ULTRASOUND GUIDANCE) Mariam Leonardo  ? Physical Exam at Discharge  Condition of Patient on Discharge: stable  ?   Valeria Rossi MD

## 2020-02-03 NOTE — DISCHARGE INSTRUCTIONS
Use ice to the perineum for the next 24 hours  Tylenol and ibuprofen can be used for pain  Expect some blood in the urine as well as a feeling of rectal fullness  Call for fever, severe pain or difficulty voiding

## 2020-02-04 NOTE — ANESTHESIA POSTPROCEDURE EVALUATION
Post-Op Assessment Note    CV Status:  Stable  Pain Score: 0    Pain management: adequate     Mental Status:  Alert and awake   Hydration Status:  Euvolemic   PONV Controlled:  Controlled   Airway Patency:  Patent   Post Op Vitals Reviewed: Yes      Staff: Anesthesiologist, CRNA           /57   Temp 97 2   Pulse 81   Resp 14   SpO2 98

## 2020-02-07 ENCOUNTER — IN-CLINIC DEVICE VISIT (OUTPATIENT)
Dept: CARDIOLOGY CLINIC | Facility: CLINIC | Age: 75
End: 2020-02-07
Payer: MEDICARE

## 2020-02-07 DIAGNOSIS — Z95.0 PACEMAKER: Primary | ICD-10-CM

## 2020-02-07 PROCEDURE — 93280 PM DEVICE PROGR EVAL DUAL: CPT | Performed by: INTERNAL MEDICINE

## 2020-02-07 NOTE — PROGRESS NOTES
Results for orders placed or performed in visit on 02/07/20   Cardiac EP device report    Narrative    SJM DUAL PPM - NOT MRI CONDITIONAL  DEVICE INTERROGATED IN THE [place] OFFICE (FULL TEST)  BATTERY VOLTAGE ADEQUATE (1 25-3 YRS)  AP 11%  12%  ALL LEAD PARAMETERS WITHIN NORMAL LIMITS  ALL OTHER TESTING WITHIN NORMAL LIMITS  NO SIGNIFICANT HIGH RATE EPISODES  NO PROGRAMMING CHANGES MADE TO DEVICE PARAMETERS  PACEMAKER FUNCTIONING APPROPRIATELY      EB

## 2020-02-11 ENCOUNTER — APPOINTMENT (OUTPATIENT)
Dept: RADIATION ONCOLOGY | Facility: CLINIC | Age: 75
End: 2020-02-11
Attending: RADIOLOGY
Payer: MEDICARE

## 2020-02-11 PROCEDURE — 77334 RADIATION TREATMENT AID(S): CPT | Performed by: RADIOLOGY

## 2020-02-19 PROCEDURE — 77300 RADIATION THERAPY DOSE PLAN: CPT | Performed by: RADIOLOGY

## 2020-02-19 PROCEDURE — 77301 RADIOTHERAPY DOSE PLAN IMRT: CPT | Performed by: RADIOLOGY

## 2020-02-19 PROCEDURE — 77338 DESIGN MLC DEVICE FOR IMRT: CPT | Performed by: RADIOLOGY

## 2020-02-20 ENCOUNTER — APPOINTMENT (OUTPATIENT)
Dept: RADIATION ONCOLOGY | Facility: CLINIC | Age: 75
End: 2020-02-20
Attending: RADIOLOGY
Payer: MEDICARE

## 2020-02-20 PROCEDURE — 77385 HB NTSTY MODUL RAD TX DLVR SMPL: CPT | Performed by: RADIOLOGY

## 2020-02-20 PROCEDURE — 77417 THER RADIOLOGY PORT IMAGE(S): CPT | Performed by: RADIOLOGY

## 2020-02-21 ENCOUNTER — APPOINTMENT (OUTPATIENT)
Dept: RADIATION ONCOLOGY | Facility: CLINIC | Age: 75
End: 2020-02-21
Attending: RADIOLOGY
Payer: MEDICARE

## 2020-02-21 DIAGNOSIS — C61 PROSTATE CANCER (HCC): Primary | ICD-10-CM

## 2020-02-21 PROCEDURE — 77385 HB NTSTY MODUL RAD TX DLVR SMPL: CPT | Performed by: RADIOLOGY

## 2020-02-24 ENCOUNTER — APPOINTMENT (OUTPATIENT)
Dept: LAB | Facility: CLINIC | Age: 75
End: 2020-02-24
Attending: RADIOLOGY
Payer: MEDICARE

## 2020-02-24 ENCOUNTER — APPOINTMENT (OUTPATIENT)
Dept: RADIATION ONCOLOGY | Facility: CLINIC | Age: 75
End: 2020-02-24
Attending: RADIOLOGY
Payer: MEDICARE

## 2020-02-24 DIAGNOSIS — C61 PROSTATE CANCER (HCC): ICD-10-CM

## 2020-02-24 LAB
BASOPHILS # BLD AUTO: 0.03 THOUSANDS/ΜL (ref 0–0.1)
BASOPHILS NFR BLD AUTO: 1 % (ref 0–1)
EOSINOPHIL # BLD AUTO: 0.14 THOUSAND/ΜL (ref 0–0.61)
EOSINOPHIL NFR BLD AUTO: 3 % (ref 0–6)
ERYTHROCYTE [DISTWIDTH] IN BLOOD BY AUTOMATED COUNT: 12.8 % (ref 11.6–15.1)
HCT VFR BLD AUTO: 40.3 % (ref 36.5–49.3)
HGB BLD-MCNC: 13.3 G/DL (ref 12–17)
LYMPHOCYTES # BLD AUTO: 0.99 THOUSANDS/ΜL (ref 0.6–4.47)
LYMPHOCYTES NFR BLD AUTO: 18 % (ref 14–44)
MCH RBC QN AUTO: 31.3 PG (ref 26.8–34.3)
MCHC RBC AUTO-ENTMCNC: 33 G/DL (ref 31.4–37.4)
MCV RBC AUTO: 95 FL (ref 82–98)
MONOCYTES # BLD AUTO: 0.48 THOUSAND/ΜL (ref 0.17–1.22)
MONOCYTES NFR BLD AUTO: 9 % (ref 4–12)
NEUTROPHILS # BLD AUTO: 3.98 THOUSANDS/ΜL (ref 1.85–7.62)
NEUTS SEG NFR BLD AUTO: 69 % (ref 43–75)
PLATELET # BLD AUTO: 183 THOUSANDS/UL (ref 149–390)
PMV BLD AUTO: 9.7 FL (ref 8.9–12.7)
PSA SERPL-MCNC: 8.7 NG/ML (ref 0–4)
RBC # BLD AUTO: 4.25 MILLION/UL (ref 3.88–5.62)
WBC # BLD AUTO: 5.62 THOUSAND/UL (ref 4.31–10.16)

## 2020-02-24 PROCEDURE — 85025 COMPLETE CBC W/AUTO DIFF WBC: CPT

## 2020-02-24 PROCEDURE — 84153 ASSAY OF PSA TOTAL: CPT

## 2020-02-24 PROCEDURE — 77385 HB NTSTY MODUL RAD TX DLVR SMPL: CPT | Performed by: RADIOLOGY

## 2020-02-24 PROCEDURE — 36415 COLL VENOUS BLD VENIPUNCTURE: CPT

## 2020-02-25 ENCOUNTER — APPOINTMENT (OUTPATIENT)
Dept: RADIATION ONCOLOGY | Facility: CLINIC | Age: 75
End: 2020-02-25
Attending: RADIOLOGY
Payer: MEDICARE

## 2020-02-25 ENCOUNTER — APPOINTMENT (OUTPATIENT)
Dept: RADIATION ONCOLOGY | Facility: CLINIC | Age: 75
End: 2020-02-25
Payer: MEDICARE

## 2020-02-25 PROCEDURE — 77385 HB NTSTY MODUL RAD TX DLVR SMPL: CPT | Performed by: RADIOLOGY

## 2020-02-26 PROCEDURE — 77336 RADIATION PHYSICS CONSULT: CPT | Performed by: RADIOLOGY

## 2020-02-26 PROCEDURE — 77385 HB NTSTY MODUL RAD TX DLVR SMPL: CPT | Performed by: RADIOLOGY

## 2020-02-27 ENCOUNTER — APPOINTMENT (OUTPATIENT)
Dept: RADIATION ONCOLOGY | Facility: CLINIC | Age: 75
End: 2020-02-27
Attending: RADIOLOGY
Payer: MEDICARE

## 2020-02-27 PROCEDURE — 77417 THER RADIOLOGY PORT IMAGE(S): CPT | Performed by: RADIOLOGY

## 2020-02-27 PROCEDURE — 77385 HB NTSTY MODUL RAD TX DLVR SMPL: CPT | Performed by: RADIOLOGY

## 2020-02-28 ENCOUNTER — APPOINTMENT (OUTPATIENT)
Dept: RADIATION ONCOLOGY | Facility: CLINIC | Age: 75
End: 2020-02-28
Attending: RADIOLOGY
Payer: MEDICARE

## 2020-02-28 PROCEDURE — 77385 HB NTSTY MODUL RAD TX DLVR SMPL: CPT | Performed by: RADIOLOGY

## 2020-03-02 ENCOUNTER — APPOINTMENT (OUTPATIENT)
Dept: RADIATION ONCOLOGY | Facility: CLINIC | Age: 75
End: 2020-03-02
Attending: RADIOLOGY
Payer: MEDICARE

## 2020-03-02 PROCEDURE — 77385 HB NTSTY MODUL RAD TX DLVR SMPL: CPT | Performed by: RADIOLOGY

## 2020-03-03 ENCOUNTER — APPOINTMENT (OUTPATIENT)
Dept: RADIATION ONCOLOGY | Facility: CLINIC | Age: 75
End: 2020-03-03
Attending: RADIOLOGY
Payer: MEDICARE

## 2020-03-03 ENCOUNTER — APPOINTMENT (OUTPATIENT)
Dept: RADIATION ONCOLOGY | Facility: CLINIC | Age: 75
End: 2020-03-03
Payer: MEDICARE

## 2020-03-03 PROCEDURE — 77385 HB NTSTY MODUL RAD TX DLVR SMPL: CPT | Performed by: RADIOLOGY

## 2020-03-04 ENCOUNTER — APPOINTMENT (OUTPATIENT)
Dept: RADIATION ONCOLOGY | Facility: CLINIC | Age: 75
End: 2020-03-04
Attending: RADIOLOGY
Payer: MEDICARE

## 2020-03-04 PROCEDURE — 77385 HB NTSTY MODUL RAD TX DLVR SMPL: CPT | Performed by: RADIOLOGY

## 2020-03-04 PROCEDURE — 77336 RADIATION PHYSICS CONSULT: CPT | Performed by: RADIOLOGY

## 2020-03-05 ENCOUNTER — APPOINTMENT (OUTPATIENT)
Dept: RADIATION ONCOLOGY | Facility: CLINIC | Age: 75
End: 2020-03-05
Attending: RADIOLOGY
Payer: MEDICARE

## 2020-03-05 PROCEDURE — 77417 THER RADIOLOGY PORT IMAGE(S): CPT | Performed by: RADIOLOGY

## 2020-03-05 PROCEDURE — 77385 HB NTSTY MODUL RAD TX DLVR SMPL: CPT | Performed by: RADIOLOGY

## 2020-03-06 ENCOUNTER — APPOINTMENT (OUTPATIENT)
Dept: RADIATION ONCOLOGY | Facility: CLINIC | Age: 75
End: 2020-03-06
Attending: RADIOLOGY
Payer: MEDICARE

## 2020-03-06 PROCEDURE — 77385 HB NTSTY MODUL RAD TX DLVR SMPL: CPT | Performed by: RADIOLOGY

## 2020-03-09 ENCOUNTER — APPOINTMENT (OUTPATIENT)
Dept: RADIATION ONCOLOGY | Facility: CLINIC | Age: 75
End: 2020-03-09
Attending: RADIOLOGY
Payer: MEDICARE

## 2020-03-09 PROCEDURE — 77385 HB NTSTY MODUL RAD TX DLVR SMPL: CPT | Performed by: RADIOLOGY

## 2020-03-10 ENCOUNTER — APPOINTMENT (OUTPATIENT)
Dept: RADIATION ONCOLOGY | Facility: CLINIC | Age: 75
End: 2020-03-10
Attending: RADIOLOGY
Payer: MEDICARE

## 2020-03-10 PROCEDURE — 77385 HB NTSTY MODUL RAD TX DLVR SMPL: CPT | Performed by: RADIOLOGY

## 2020-03-11 ENCOUNTER — APPOINTMENT (OUTPATIENT)
Dept: RADIATION ONCOLOGY | Facility: CLINIC | Age: 75
End: 2020-03-11
Attending: RADIOLOGY
Payer: MEDICARE

## 2020-03-11 PROCEDURE — 77336 RADIATION PHYSICS CONSULT: CPT | Performed by: RADIOLOGY

## 2020-03-11 PROCEDURE — 77385 HB NTSTY MODUL RAD TX DLVR SMPL: CPT | Performed by: RADIOLOGY

## 2020-03-12 ENCOUNTER — APPOINTMENT (OUTPATIENT)
Dept: RADIATION ONCOLOGY | Facility: CLINIC | Age: 75
End: 2020-03-12
Attending: RADIOLOGY
Payer: MEDICARE

## 2020-03-12 PROCEDURE — 77385 HB NTSTY MODUL RAD TX DLVR SMPL: CPT | Performed by: RADIOLOGY

## 2020-03-13 ENCOUNTER — APPOINTMENT (OUTPATIENT)
Dept: RADIATION ONCOLOGY | Facility: CLINIC | Age: 75
End: 2020-03-13
Attending: RADIOLOGY
Payer: MEDICARE

## 2020-03-13 PROCEDURE — 77385 HB NTSTY MODUL RAD TX DLVR SMPL: CPT | Performed by: RADIOLOGY

## 2020-03-16 ENCOUNTER — APPOINTMENT (OUTPATIENT)
Dept: RADIATION ONCOLOGY | Facility: CLINIC | Age: 75
End: 2020-03-16
Attending: RADIOLOGY
Payer: MEDICARE

## 2020-03-16 PROCEDURE — 77385 HB NTSTY MODUL RAD TX DLVR SMPL: CPT | Performed by: RADIOLOGY

## 2020-03-17 ENCOUNTER — APPOINTMENT (OUTPATIENT)
Dept: RADIATION ONCOLOGY | Facility: CLINIC | Age: 75
End: 2020-03-17
Attending: RADIOLOGY
Payer: MEDICARE

## 2020-03-17 ENCOUNTER — DOCUMENTATION (OUTPATIENT)
Dept: INFUSION CENTER | Facility: CLINIC | Age: 75
End: 2020-03-17

## 2020-03-17 DIAGNOSIS — C61 PROSTATE CANCER (HCC): Primary | ICD-10-CM

## 2020-03-17 PROCEDURE — 77385 HB NTSTY MODUL RAD TX DLVR SMPL: CPT | Performed by: RADIOLOGY

## 2020-03-17 RX ORDER — TAMSULOSIN HYDROCHLORIDE 0.4 MG/1
0.4 CAPSULE ORAL
Qty: 30 CAPSULE | Refills: 1 | Status: SHIPPED | OUTPATIENT
Start: 2020-03-17 | End: 2020-04-16 | Stop reason: SDUPTHER

## 2020-03-17 NOTE — SOCIAL WORK
MSW received pt's distress thermometer, scoring low (3)  No concerns noted, no further support needed at this time

## 2020-03-18 ENCOUNTER — APPOINTMENT (OUTPATIENT)
Dept: RADIATION ONCOLOGY | Facility: CLINIC | Age: 75
End: 2020-03-18
Attending: RADIOLOGY
Payer: MEDICARE

## 2020-03-18 PROCEDURE — 77385 HB NTSTY MODUL RAD TX DLVR SMPL: CPT | Performed by: RADIOLOGY

## 2020-03-18 PROCEDURE — 77336 RADIATION PHYSICS CONSULT: CPT | Performed by: RADIOLOGY

## 2020-03-19 ENCOUNTER — APPOINTMENT (OUTPATIENT)
Dept: RADIATION ONCOLOGY | Facility: CLINIC | Age: 75
End: 2020-03-19
Attending: RADIOLOGY
Payer: MEDICARE

## 2020-03-19 PROCEDURE — 77417 THER RADIOLOGY PORT IMAGE(S): CPT | Performed by: RADIOLOGY

## 2020-03-19 PROCEDURE — 77385 HB NTSTY MODUL RAD TX DLVR SMPL: CPT | Performed by: RADIOLOGY

## 2020-03-20 ENCOUNTER — APPOINTMENT (OUTPATIENT)
Dept: RADIATION ONCOLOGY | Facility: CLINIC | Age: 75
End: 2020-03-20
Attending: RADIOLOGY
Payer: MEDICARE

## 2020-03-20 PROCEDURE — 77385 HB NTSTY MODUL RAD TX DLVR SMPL: CPT | Performed by: RADIOLOGY

## 2020-03-23 ENCOUNTER — APPOINTMENT (OUTPATIENT)
Dept: RADIATION ONCOLOGY | Facility: CLINIC | Age: 75
End: 2020-03-23
Attending: RADIOLOGY
Payer: MEDICARE

## 2020-03-23 PROCEDURE — 77385 HB NTSTY MODUL RAD TX DLVR SMPL: CPT | Performed by: RADIOLOGY

## 2020-03-24 ENCOUNTER — APPOINTMENT (OUTPATIENT)
Dept: RADIATION ONCOLOGY | Facility: CLINIC | Age: 75
End: 2020-03-24
Attending: RADIOLOGY
Payer: MEDICARE

## 2020-03-25 ENCOUNTER — APPOINTMENT (OUTPATIENT)
Dept: RADIATION ONCOLOGY | Facility: CLINIC | Age: 75
End: 2020-03-25
Attending: RADIOLOGY
Payer: MEDICARE

## 2020-03-25 PROCEDURE — 77385 HB NTSTY MODUL RAD TX DLVR SMPL: CPT | Performed by: RADIOLOGY

## 2020-03-25 PROCEDURE — 77417 THER RADIOLOGY PORT IMAGE(S): CPT | Performed by: RADIOLOGY

## 2020-03-26 ENCOUNTER — APPOINTMENT (OUTPATIENT)
Dept: RADIATION ONCOLOGY | Facility: CLINIC | Age: 75
End: 2020-03-26
Attending: RADIOLOGY
Payer: MEDICARE

## 2020-03-26 PROCEDURE — 77336 RADIATION PHYSICS CONSULT: CPT | Performed by: RADIOLOGY

## 2020-03-26 PROCEDURE — 77385 HB NTSTY MODUL RAD TX DLVR SMPL: CPT | Performed by: RADIOLOGY

## 2020-03-27 ENCOUNTER — APPOINTMENT (OUTPATIENT)
Dept: RADIATION ONCOLOGY | Facility: CLINIC | Age: 75
End: 2020-03-27
Attending: RADIOLOGY
Payer: MEDICARE

## 2020-03-27 PROCEDURE — 77385 HB NTSTY MODUL RAD TX DLVR SMPL: CPT | Performed by: RADIOLOGY

## 2020-03-30 ENCOUNTER — APPOINTMENT (OUTPATIENT)
Dept: RADIATION ONCOLOGY | Facility: CLINIC | Age: 75
End: 2020-03-30
Attending: RADIOLOGY
Payer: MEDICARE

## 2020-03-30 PROCEDURE — 77385 HB NTSTY MODUL RAD TX DLVR SMPL: CPT | Performed by: RADIOLOGY

## 2020-03-31 ENCOUNTER — APPOINTMENT (OUTPATIENT)
Dept: RADIATION ONCOLOGY | Facility: CLINIC | Age: 75
End: 2020-03-31
Attending: RADIOLOGY
Payer: MEDICARE

## 2020-03-31 PROCEDURE — 77385 HB NTSTY MODUL RAD TX DLVR SMPL: CPT | Performed by: RADIOLOGY

## 2020-04-01 ENCOUNTER — APPOINTMENT (OUTPATIENT)
Dept: RADIATION ONCOLOGY | Facility: CLINIC | Age: 75
End: 2020-04-01
Attending: RADIOLOGY
Payer: MEDICARE

## 2020-04-01 ENCOUNTER — APPOINTMENT (OUTPATIENT)
Dept: LAB | Facility: CLINIC | Age: 75
End: 2020-04-01
Attending: RADIOLOGY
Payer: MEDICARE

## 2020-04-01 DIAGNOSIS — C61 PROSTATE CANCER (HCC): Primary | ICD-10-CM

## 2020-04-01 LAB
BILIRUB UR QL STRIP: NEGATIVE
CLARITY UR: CLEAR
COLOR UR: YELLOW
GLUCOSE UR STRIP-MCNC: NEGATIVE MG/DL
HGB UR QL STRIP.AUTO: NEGATIVE
KETONES UR STRIP-MCNC: NEGATIVE MG/DL
LEUKOCYTE ESTERASE UR QL STRIP: NEGATIVE
NITRITE UR QL STRIP: NEGATIVE
PH UR STRIP.AUTO: 5.5 [PH]
PROT UR STRIP-MCNC: NEGATIVE MG/DL
SP GR UR STRIP.AUTO: 1.02 (ref 1–1.03)
UROBILINOGEN UR QL STRIP.AUTO: 0.2 E.U./DL

## 2020-04-01 PROCEDURE — 77385 HB NTSTY MODUL RAD TX DLVR SMPL: CPT | Performed by: RADIOLOGY

## 2020-04-01 PROCEDURE — 81003 URINALYSIS AUTO W/O SCOPE: CPT

## 2020-04-02 ENCOUNTER — APPOINTMENT (OUTPATIENT)
Dept: RADIATION ONCOLOGY | Facility: CLINIC | Age: 75
End: 2020-04-02
Attending: RADIOLOGY
Payer: MEDICARE

## 2020-04-02 PROCEDURE — 77417 THER RADIOLOGY PORT IMAGE(S): CPT | Performed by: RADIOLOGY

## 2020-04-02 PROCEDURE — 77336 RADIATION PHYSICS CONSULT: CPT | Performed by: RADIOLOGY

## 2020-04-02 PROCEDURE — 77385 HB NTSTY MODUL RAD TX DLVR SMPL: CPT | Performed by: RADIOLOGY

## 2020-04-03 ENCOUNTER — APPOINTMENT (OUTPATIENT)
Dept: RADIATION ONCOLOGY | Facility: CLINIC | Age: 75
End: 2020-04-03
Attending: RADIOLOGY
Payer: MEDICARE

## 2020-04-03 PROCEDURE — 77385 HB NTSTY MODUL RAD TX DLVR SMPL: CPT | Performed by: RADIOLOGY

## 2020-04-06 ENCOUNTER — APPOINTMENT (OUTPATIENT)
Dept: RADIATION ONCOLOGY | Facility: CLINIC | Age: 75
End: 2020-04-06
Attending: RADIOLOGY
Payer: MEDICARE

## 2020-04-06 PROCEDURE — 77385 HB NTSTY MODUL RAD TX DLVR SMPL: CPT | Performed by: RADIOLOGY

## 2020-04-07 ENCOUNTER — APPOINTMENT (OUTPATIENT)
Dept: RADIATION ONCOLOGY | Facility: CLINIC | Age: 75
End: 2020-04-07
Attending: RADIOLOGY
Payer: MEDICARE

## 2020-04-07 PROCEDURE — 77417 THER RADIOLOGY PORT IMAGE(S): CPT | Performed by: RADIOLOGY

## 2020-04-07 PROCEDURE — 77385 HB NTSTY MODUL RAD TX DLVR SMPL: CPT | Performed by: RADIOLOGY

## 2020-04-08 ENCOUNTER — APPOINTMENT (OUTPATIENT)
Dept: RADIATION ONCOLOGY | Facility: CLINIC | Age: 75
End: 2020-04-08
Attending: RADIOLOGY
Payer: MEDICARE

## 2020-04-08 PROCEDURE — 77385 HB NTSTY MODUL RAD TX DLVR SMPL: CPT | Performed by: RADIOLOGY

## 2020-04-09 ENCOUNTER — APPOINTMENT (OUTPATIENT)
Dept: RADIATION ONCOLOGY | Facility: CLINIC | Age: 75
End: 2020-04-09
Attending: RADIOLOGY
Payer: MEDICARE

## 2020-04-09 PROCEDURE — 77336 RADIATION PHYSICS CONSULT: CPT | Performed by: RADIOLOGY

## 2020-04-09 PROCEDURE — 77385 HB NTSTY MODUL RAD TX DLVR SMPL: CPT | Performed by: RADIOLOGY

## 2020-04-10 ENCOUNTER — APPOINTMENT (OUTPATIENT)
Dept: RADIATION ONCOLOGY | Facility: CLINIC | Age: 75
End: 2020-04-10
Attending: RADIOLOGY
Payer: MEDICARE

## 2020-04-10 PROCEDURE — 77385 HB NTSTY MODUL RAD TX DLVR SMPL: CPT | Performed by: RADIOLOGY

## 2020-04-13 ENCOUNTER — APPOINTMENT (OUTPATIENT)
Dept: RADIATION ONCOLOGY | Facility: CLINIC | Age: 75
End: 2020-04-13
Attending: RADIOLOGY
Payer: MEDICARE

## 2020-04-13 PROCEDURE — 77385 HB NTSTY MODUL RAD TX DLVR SMPL: CPT | Performed by: RADIOLOGY

## 2020-04-14 ENCOUNTER — APPOINTMENT (OUTPATIENT)
Dept: RADIATION ONCOLOGY | Facility: CLINIC | Age: 75
End: 2020-04-14
Attending: RADIOLOGY
Payer: MEDICARE

## 2020-04-14 PROCEDURE — 77385 HB NTSTY MODUL RAD TX DLVR SMPL: CPT | Performed by: RADIOLOGY

## 2020-04-15 ENCOUNTER — APPOINTMENT (OUTPATIENT)
Dept: RADIATION ONCOLOGY | Facility: CLINIC | Age: 75
End: 2020-04-15
Attending: RADIOLOGY
Payer: MEDICARE

## 2020-04-15 PROCEDURE — 77385 HB NTSTY MODUL RAD TX DLVR SMPL: CPT | Performed by: RADIOLOGY

## 2020-04-16 ENCOUNTER — APPOINTMENT (OUTPATIENT)
Dept: RADIATION ONCOLOGY | Facility: CLINIC | Age: 75
End: 2020-04-16
Attending: RADIOLOGY
Payer: MEDICARE

## 2020-04-16 ENCOUNTER — TELEPHONE (OUTPATIENT)
Dept: RADIATION ONCOLOGY | Facility: HOSPITAL | Age: 75
End: 2020-04-16

## 2020-04-16 ENCOUNTER — APPOINTMENT (OUTPATIENT)
Dept: LAB | Facility: CLINIC | Age: 75
End: 2020-04-16
Attending: RADIOLOGY
Payer: MEDICARE

## 2020-04-16 DIAGNOSIS — C61 PROSTATE CANCER (HCC): Primary | ICD-10-CM

## 2020-04-16 DIAGNOSIS — C61 PROSTATE CANCER (HCC): ICD-10-CM

## 2020-04-16 LAB
BILIRUB UR QL STRIP: NEGATIVE
CLARITY UR: CLEAR
COLOR UR: YELLOW
GLUCOSE UR STRIP-MCNC: NEGATIVE MG/DL
HGB UR QL STRIP.AUTO: NEGATIVE
KETONES UR STRIP-MCNC: NEGATIVE MG/DL
LEUKOCYTE ESTERASE UR QL STRIP: NEGATIVE
NITRITE UR QL STRIP: NEGATIVE
PH UR STRIP.AUTO: 6 [PH]
PROT UR STRIP-MCNC: NEGATIVE MG/DL
SP GR UR STRIP.AUTO: 1.02 (ref 1–1.03)
UROBILINOGEN UR QL STRIP.AUTO: 0.2 E.U./DL

## 2020-04-16 PROCEDURE — 77385 HB NTSTY MODUL RAD TX DLVR SMPL: CPT | Performed by: RADIOLOGY

## 2020-04-16 PROCEDURE — 77336 RADIATION PHYSICS CONSULT: CPT | Performed by: RADIOLOGY

## 2020-04-16 PROCEDURE — 81003 URINALYSIS AUTO W/O SCOPE: CPT | Performed by: RADIOLOGY

## 2020-04-16 RX ORDER — TAMSULOSIN HYDROCHLORIDE 0.4 MG/1
0.8 CAPSULE ORAL
Qty: 60 CAPSULE | Refills: 1 | Status: SHIPPED | OUTPATIENT
Start: 2020-04-16 | End: 2020-05-07

## 2020-04-17 ENCOUNTER — APPOINTMENT (OUTPATIENT)
Dept: RADIATION ONCOLOGY | Facility: CLINIC | Age: 75
End: 2020-04-17
Attending: RADIOLOGY
Payer: MEDICARE

## 2020-04-17 PROCEDURE — 77385 HB NTSTY MODUL RAD TX DLVR SMPL: CPT | Performed by: RADIOLOGY

## 2020-04-20 ENCOUNTER — APPOINTMENT (OUTPATIENT)
Dept: RADIATION ONCOLOGY | Facility: CLINIC | Age: 75
End: 2020-04-20
Attending: RADIOLOGY
Payer: MEDICARE

## 2020-04-20 PROCEDURE — 77385 HB NTSTY MODUL RAD TX DLVR SMPL: CPT | Performed by: RADIOLOGY

## 2020-04-21 ENCOUNTER — APPOINTMENT (OUTPATIENT)
Dept: RADIATION ONCOLOGY | Facility: CLINIC | Age: 75
End: 2020-04-21
Attending: RADIOLOGY
Payer: MEDICARE

## 2020-04-21 PROCEDURE — 77417 THER RADIOLOGY PORT IMAGE(S): CPT | Performed by: RADIOLOGY

## 2020-04-21 PROCEDURE — 77385 HB NTSTY MODUL RAD TX DLVR SMPL: CPT | Performed by: RADIOLOGY

## 2020-04-22 ENCOUNTER — APPOINTMENT (OUTPATIENT)
Dept: RADIATION ONCOLOGY | Facility: CLINIC | Age: 75
End: 2020-04-22
Payer: MEDICARE

## 2020-04-22 ENCOUNTER — APPOINTMENT (OUTPATIENT)
Dept: RADIATION ONCOLOGY | Facility: CLINIC | Age: 75
End: 2020-04-22
Attending: RADIOLOGY
Payer: MEDICARE

## 2020-04-22 PROCEDURE — 77385 HB NTSTY MODUL RAD TX DLVR SMPL: CPT | Performed by: RADIOLOGY

## 2020-04-22 PROCEDURE — 77336 RADIATION PHYSICS CONSULT: CPT | Performed by: RADIOLOGY

## 2020-04-23 ENCOUNTER — APPOINTMENT (OUTPATIENT)
Dept: RADIATION ONCOLOGY | Facility: CLINIC | Age: 75
End: 2020-04-23
Payer: MEDICARE

## 2020-05-05 DIAGNOSIS — C61 PROSTATE CANCER (HCC): ICD-10-CM

## 2020-05-07 RX ORDER — TAMSULOSIN HYDROCHLORIDE 0.4 MG/1
CAPSULE ORAL
Qty: 30 CAPSULE | Refills: 1 | Status: SHIPPED | OUTPATIENT
Start: 2020-05-07 | End: 2020-11-17 | Stop reason: ALTCHOICE

## 2020-05-26 ENCOUNTER — TELEPHONE (OUTPATIENT)
Dept: RADIATION ONCOLOGY | Facility: CLINIC | Age: 75
End: 2020-05-26

## 2020-05-26 ENCOUNTER — TELEMEDICINE (OUTPATIENT)
Dept: RADIATION ONCOLOGY | Facility: CLINIC | Age: 75
End: 2020-05-26
Attending: RADIOLOGY

## 2020-05-26 DIAGNOSIS — C61 PROSTATE CANCER (HCC): Primary | ICD-10-CM

## 2020-06-17 DIAGNOSIS — E78.5 HYPERLIPIDEMIA: ICD-10-CM

## 2020-06-17 RX ORDER — ATORVASTATIN CALCIUM 20 MG/1
20 TABLET, FILM COATED ORAL
Qty: 90 TABLET | Refills: 3 | Status: SHIPPED | OUTPATIENT
Start: 2020-06-17 | End: 2021-09-10

## 2020-07-22 ENCOUNTER — APPOINTMENT (OUTPATIENT)
Dept: LAB | Facility: HOSPITAL | Age: 75
End: 2020-07-22
Attending: RADIOLOGY
Payer: MEDICARE

## 2020-07-22 DIAGNOSIS — C61 PROSTATE CANCER (HCC): ICD-10-CM

## 2020-07-22 LAB — PSA SERPL-MCNC: 1.6 NG/ML (ref 0–4)

## 2020-07-22 PROCEDURE — 84153 ASSAY OF PSA TOTAL: CPT

## 2020-09-09 DIAGNOSIS — E78.00 PURE HYPERCHOLESTEROLEMIA: Primary | ICD-10-CM

## 2020-09-14 ENCOUNTER — APPOINTMENT (OUTPATIENT)
Dept: LAB | Facility: HOSPITAL | Age: 75
End: 2020-09-14
Attending: INTERNAL MEDICINE
Payer: MEDICARE

## 2020-09-14 DIAGNOSIS — E78.00 PURE HYPERCHOLESTEROLEMIA: ICD-10-CM

## 2020-09-14 LAB
ALBUMIN SERPL BCP-MCNC: 3.5 G/DL (ref 3.5–5)
ALP SERPL-CCNC: 65 U/L (ref 46–116)
ALT SERPL W P-5'-P-CCNC: 31 U/L (ref 12–78)
ANION GAP SERPL CALCULATED.3IONS-SCNC: 6 MMOL/L (ref 4–13)
AST SERPL W P-5'-P-CCNC: 27 U/L (ref 5–45)
BILIRUB DIRECT SERPL-MCNC: 0.12 MG/DL (ref 0–0.2)
BILIRUB SERPL-MCNC: 0.39 MG/DL (ref 0.2–1)
BUN SERPL-MCNC: 13 MG/DL (ref 5–25)
CALCIUM SERPL-MCNC: 8.2 MG/DL (ref 8.3–10.1)
CHLORIDE SERPL-SCNC: 104 MMOL/L (ref 100–108)
CHOLEST SERPL-MCNC: 167 MG/DL (ref 50–200)
CO2 SERPL-SCNC: 29 MMOL/L (ref 21–32)
CREAT SERPL-MCNC: 0.83 MG/DL (ref 0.6–1.3)
ERYTHROCYTE [DISTWIDTH] IN BLOOD BY AUTOMATED COUNT: 12.3 % (ref 11.6–15.1)
GFR SERPL CREATININE-BSD FRML MDRD: 85 ML/MIN/1.73SQ M
GLUCOSE P FAST SERPL-MCNC: 94 MG/DL (ref 65–99)
HCT VFR BLD AUTO: 40 % (ref 36.5–49.3)
HDLC SERPL-MCNC: 69 MG/DL
HGB BLD-MCNC: 13 G/DL (ref 12–17)
LDLC SERPL CALC-MCNC: 90 MG/DL (ref 0–100)
MCH RBC QN AUTO: 32.4 PG (ref 26.8–34.3)
MCHC RBC AUTO-ENTMCNC: 32.5 G/DL (ref 31.4–37.4)
MCV RBC AUTO: 100 FL (ref 82–98)
PLATELET # BLD AUTO: 149 THOUSANDS/UL (ref 149–390)
PMV BLD AUTO: 10 FL (ref 8.9–12.7)
POTASSIUM SERPL-SCNC: 4.3 MMOL/L (ref 3.5–5.3)
PROT SERPL-MCNC: 6.4 G/DL (ref 6.4–8.2)
RBC # BLD AUTO: 4.01 MILLION/UL (ref 3.88–5.62)
SODIUM SERPL-SCNC: 139 MMOL/L (ref 136–145)
TRIGL SERPL-MCNC: 42 MG/DL
TSH SERPL DL<=0.05 MIU/L-ACNC: 1.5 UIU/ML (ref 0.36–3.74)
WBC # BLD AUTO: 3.43 THOUSAND/UL (ref 4.31–10.16)

## 2020-09-14 PROCEDURE — 85027 COMPLETE CBC AUTOMATED: CPT | Performed by: INTERNAL MEDICINE

## 2020-09-14 PROCEDURE — 36415 COLL VENOUS BLD VENIPUNCTURE: CPT | Performed by: INTERNAL MEDICINE

## 2020-09-14 PROCEDURE — 80061 LIPID PANEL: CPT

## 2020-09-14 PROCEDURE — 80048 BASIC METABOLIC PNL TOTAL CA: CPT | Performed by: INTERNAL MEDICINE

## 2020-09-14 PROCEDURE — 84443 ASSAY THYROID STIM HORMONE: CPT | Performed by: INTERNAL MEDICINE

## 2020-09-14 PROCEDURE — 80076 HEPATIC FUNCTION PANEL: CPT | Performed by: INTERNAL MEDICINE

## 2020-09-24 ENCOUNTER — TELEPHONE (OUTPATIENT)
Dept: CARDIOLOGY CLINIC | Facility: CLINIC | Age: 75
End: 2020-09-24

## 2020-09-24 NOTE — TELEPHONE ENCOUNTER
Patient called back and wanted to be sure you saw his CBC results  He notices that he has had some drops in his platelets, white blood count  He is seeing his PCP and I suggested he discuss these results at that visit which he will do

## 2020-09-29 ENCOUNTER — OFFICE VISIT (OUTPATIENT)
Dept: FAMILY MEDICINE CLINIC | Facility: CLINIC | Age: 75
End: 2020-09-29
Payer: MEDICARE

## 2020-09-29 VITALS
WEIGHT: 142 LBS | HEIGHT: 68 IN | SYSTOLIC BLOOD PRESSURE: 130 MMHG | BODY MASS INDEX: 21.52 KG/M2 | TEMPERATURE: 97.5 F | DIASTOLIC BLOOD PRESSURE: 80 MMHG | HEART RATE: 57 BPM | OXYGEN SATURATION: 98 %

## 2020-09-29 DIAGNOSIS — C61 PROSTATE CANCER (HCC): ICD-10-CM

## 2020-09-29 DIAGNOSIS — D72.819 LEUKOPENIA, UNSPECIFIED TYPE: ICD-10-CM

## 2020-09-29 DIAGNOSIS — Z23 IMMUNIZATION DUE: Primary | ICD-10-CM

## 2020-09-29 PROBLEM — K40.90 INGUINAL HERNIA: Chronic | Status: RESOLVED | Noted: 2019-12-12 | Resolved: 2020-09-29

## 2020-09-29 LAB
BASOPHILS # BLD AUTO: 0.03 THOUSANDS/ΜL (ref 0–0.1)
BASOPHILS NFR BLD AUTO: 1 % (ref 0–1)
EOSINOPHIL # BLD AUTO: 0.09 THOUSAND/ΜL (ref 0–0.61)
EOSINOPHIL NFR BLD AUTO: 2 % (ref 0–6)
ERYTHROCYTE [DISTWIDTH] IN BLOOD BY AUTOMATED COUNT: 12.6 % (ref 11.6–15.1)
HCT VFR BLD AUTO: 39.4 % (ref 36.5–49.3)
HGB BLD-MCNC: 13.2 G/DL (ref 12–17)
IMM GRANULOCYTES # BLD AUTO: 0.01 THOUSAND/UL (ref 0–0.2)
IMM GRANULOCYTES NFR BLD AUTO: 0 % (ref 0–2)
LYMPHOCYTES # BLD AUTO: 0.71 THOUSANDS/ΜL (ref 0.6–4.47)
LYMPHOCYTES NFR BLD AUTO: 19 % (ref 14–44)
MCH RBC QN AUTO: 32.7 PG (ref 26.8–34.3)
MCHC RBC AUTO-ENTMCNC: 33.5 G/DL (ref 31.4–37.4)
MCV RBC AUTO: 98 FL (ref 82–98)
MONOCYTES # BLD AUTO: 0.35 THOUSAND/ΜL (ref 0.17–1.22)
MONOCYTES NFR BLD AUTO: 9 % (ref 4–12)
NEUTROPHILS # BLD AUTO: 2.6 THOUSANDS/ΜL (ref 1.85–7.62)
NEUTS SEG NFR BLD AUTO: 69 % (ref 43–75)
NRBC BLD AUTO-RTO: 0 /100 WBCS
PLATELET # BLD AUTO: 177 THOUSANDS/UL (ref 149–390)
PMV BLD AUTO: 10.2 FL (ref 8.9–12.7)
RBC # BLD AUTO: 4.04 MILLION/UL (ref 3.88–5.62)
WBC # BLD AUTO: 3.79 THOUSAND/UL (ref 4.31–10.16)

## 2020-09-29 PROCEDURE — 85025 COMPLETE CBC W/AUTO DIFF WBC: CPT | Performed by: FAMILY MEDICINE

## 2020-09-29 PROCEDURE — G0438 PPPS, INITIAL VISIT: HCPCS | Performed by: FAMILY MEDICINE

## 2020-09-29 PROCEDURE — 36415 COLL VENOUS BLD VENIPUNCTURE: CPT | Performed by: FAMILY MEDICINE

## 2020-09-29 PROCEDURE — 99214 OFFICE O/P EST MOD 30 MIN: CPT | Performed by: FAMILY MEDICINE

## 2020-09-29 RX ORDER — A/SINGAPORE/GP1908/2015 IVR-180 (AN A/MICHIGAN/45/2015 (H1N1)PDM09-LIKE VIRUS, A/HONG KONG/4801/2014, NYMC X-263B (H3N2) (AN A/HONG KONG/4801/2014-LIKE VIRUS), AND B/BRISBANE/60/2008, WILD TYPE (A B/BRISBANE/60/2008-LIKE VIRUS) 15; 15; 15 UG/.5ML; UG/.5ML; UG/.5ML
INJECTION, SUSPENSION INTRAMUSCULAR
COMMUNITY
Start: 2020-09-15 | End: 2020-11-17 | Stop reason: SDUPTHER

## 2020-09-29 NOTE — PROGRESS NOTES
Assessment/Plan:    No problem-specific Assessment & Plan notes found for this encounter  Diagnoses and all orders for this visit:    Immunization due    Prostate cancer ([facility] [address] )    Other orders  -     Cancel: influenza vaccine, high-dose, PF 0 7 mL (FLUZONE HIGH-DOSE)  -     Fluad Quadrivalent 0 5 ML PRSY; PHARMACY ADMINISTERED          Subjective:      Patient ID: Birtha Skiff is a 67 y o  male  HPI    The following portions of the patient's history were reviewed and updated as appropriate: allergies, current medications, past family history, past medical history, past social history, past surgical history and problem list     Review of Systems   Constitutional: Negative for activity change and appetite change  Night sweats/ 8 lbs weight loss   HENT: Negative for trouble swallowing  Eyes: Negative for visual disturbance  Respiratory: Negative for cough and shortness of breath  Cardiovascular: Negative for chest pain, palpitations and leg swelling  Gastrointestinal: Negative for abdominal pain and blood in stool  Endocrine: Negative for polyuria  Genitourinary: Negative for difficulty urinating and hematuria  Skin: Negative for rash  Neurological: Negative for dizziness  Psychiatric/Behavioral: Negative for behavioral problems  Objective:  Vitals:    09/29/20 1125   BP: 130/80   BP Location: Left arm   Patient Position: Sitting   Cuff Size: Adult   Pulse: 57   Temp: 97 5 °F (36 4 °C)   TempSrc: Temporal   SpO2: 98%   Weight: 64 4 kg (142 lb)   Height: 5' 8" (1 727 m)      Physical Exam  Constitutional:       Appearance: He is well-developed  HENT:      Head: Normocephalic and atraumatic  Eyes:      Conjunctiva/sclera: Conjunctivae normal    Neck:      Musculoskeletal: Neck supple  Thyroid: No thyromegaly  Cardiovascular:      Rate and Rhythm: Normal rate and regular rhythm  Heart sounds: Normal heart sounds  No murmur     Pulmonary:      Effort: Pulmonary effort is normal  No respiratory distress  Breath sounds: Normal breath sounds  Lymphadenopathy:      Cervical: No cervical adenopathy  Skin:     General: Skin is warm and dry  Psychiatric:         Behavior: Behavior normal            Patient has developed a mild leukopenia over the last 9-10 months  No differential count was reported on the last CBC so will repeat that along with a differential     A exam shows no specific abnormalities  He has had some mild weight loss as well over the last 9-10 months   Assessment and Plan:     Problem List Items Addressed This Visit        Genitourinary    Prostate cancer [facility])      Other Visit Diagnoses     Immunization due    -  Primary           Preventive health issues were discussed with patient, and age appropriate screening tests were ordered as noted in patient's After Visit Summary  Personalized health advice and appropriate referrals for health education or preventive services given if needed, as noted in patient's After Visit Summary       History of Present Illness:     Patient presents for Medicare Annual Wellness visit    Patient Care Team:  Betina Ingram MD as PCP - General (Family Medicine)  Abdon Bonner MD (Radiation Oncology)  Chito Baca MD (Urology)     Problem List:     Patient Active Problem List   Diagnosis    Elevated PSA    Prostate cancer [facility])    Inguinal hernia    Non-recurrent unilateral inguinal hernia without obstruction or gangrene    Leukopenia      Past Medical and Surgical History:     Past Medical History:   Diagnosis Date    Cancer ([address] )     skin    Elevated PSA     Hyperlipidemia     Narcolepsy     Pacemaker 1988    Symptomatic Bradycardia    Prostate cancer [facility])      Past Surgical History:   Procedure Laterality Date    ATRIAL CARDIAC PACEMAKER INSERTION      CARDIAC CATHETERIZATION  02/12/2015    COLONOSCOPY      INSERTION OF FIDUCIAL MARKER (TRANSRECTAL ULTRASOUND GUIDANCE) N/A 2/3/2020    Procedure: INSERTION OF FIDUCIAL MARKER (TRANSRECTAL ULTRASOUND GUIDANCE) SPACEOAR;  Surgeon: Pedro Jimenez MD;  Location: [facility];  Service: Urology    [place] REPAIR [address] HERNIA,5+Y/O,REDUCIBL Left 12/20/2019    Procedure: INGUINAL HERNIA REPAIR;  Surgeon: Israel Mart DO;  [facility];  Service: General    SKIN CANCER EXCISION      left cheek      Family History:     Family History   Problem Relation Age of Onset    Coronary artery disease Mother     Coronary artery disease Father     Skin cancer Father     Hyperlipidemia Father     Cancer Father         bladder cancer    Coronary artery disease Brother       Social History:        Social History     Socioeconomic History    Marital status: /Civil Union     Spouse name: Not on file    Number of children: 3    Years of education: Not on file    Highest education level: Not on file   Occupational History    Not on file   Social Needs    Financial resource strain: Not on file    Food insecurity     Worry: Not on file     Inability: Not on file   [employer] needs     Medical: Not on file     Non-medical: Not on file   Tobacco Use    Smoking status: Never Smoker    Smokeless tobacco: Never Used   Substance and Sexual Activity    Alcohol use:  Yes     Alcohol/week: 7 0 standard drinks     Types: 7 Glasses of wine per week     Frequency: 4 or more times a week     Comment: 1 glass beer/wine daily    Drug use: No    Sexual activity: Yes   Lifestyle    Physical activity     Days per week: Not on file     Minutes per session: Not on file    Stress: Not on file   Relationships    Social connections     Talks on phone: Not on file     Gets together: Not on file     Attends [employer] service: Not on file     Active member of club or organization: Not on file     Attends meetings of clubs or organizations: Not on file     Relationship status: Not on file    Intimate partner violence     Fear of current or ex partner: Not on file Emotionally abused: Not on file     Physically abused: Not on file     Forced sexual activity: Not on file   Other Topics Concern    Not on file   Social History Narrative    Not on file      Medications and Allergies:     Current Outpatient Medications   Medication Sig Dispense Refill    atorvastatin (LIPITOR) 20 mg tablet Take 1 tablet (20 mg total) by mouth daily in the early morning 90 tablet 3    cholecalciferol (VITAMIN D3) 1,000 units tablet Take 1,000 Units by mouth daily      Fluad Quadrivalent 0 5 ML PRSY PHARMACY ADMINISTERED      modafinil (PROVIGIL) 200 MG tablet Take 200 mg by mouth daily      sildenafil (REVATIO) 20 mg tablet Take 3 to 5 tablets one (1) hour prior to intercourse  Take no more than 5 tablets daily  90 tablet 3    tamsulosin (FLOMAX) 0 4 mg TAKE 1 CAPSULE BY MOUTH EVERY DAY WITH DINNER 30 capsule 1     No current facility-administered medications for this visit  No Known Allergies   Immunizations:     Immunization History   Administered Date(s) Administered    H1N1, All Formulations 01/19/2010    Hep A, adult 03/06/2014    INFLUENZA 10/09/2014, 11/25/2016, 10/05/2018, 10/30/2019, 09/15/2020    Influenza Split High Dose Preservative Free IM 10/29/2015    Influenza, high dose seasonal 0 7 mL 10/30/2019    Pneumococcal Conjugate 13-Valent 09/12/2017, 12/12/2019    Pneumococcal Polysaccharide PPV23 01/19/2010, 02/29/2016, 04/08/2016    Zoster 01/25/2010    Zoster Vaccine Recombinant 06/14/2018, 08/27/2018, 08/27/2018      Health Maintenance: There are no preventive care reminders to display for this patient        Topic Date Due    DTaP,Tdap,and Td Vaccines (1 - Tdap) 05/27/1965      Medicare Health Risk Assessment:     /80 (BP Location: Left arm, Patient Position: Sitting, Cuff Size: Adult)   Pulse 57   Temp 97 5 °F (36 4 °C) (Temporal)   Ht 5' 8" (1 727 m)   Wt 64 4 kg (142 lb)   SpO2 98%   BMI 21 59 kg/m²      Korina Fuller is here for his Initial Wellness visit  Depression Screening:   PHQ-2 Score: 0      Previous Hospitalizations:   Any hospitalizations or ED visits within the last 12 months?: No      Hospitalization Comments: Had external beam radiation for prostate cancer this spring     Advance Care Planning:   Living will: Yes    Durable POA for healthcare:  Yes    Advanced directive: Yes      Cognitive Screening:   Provider or family/friend/caregiver concerned regarding cognition?: No    PREVENTIVE SCREENINGS      Cardiovascular Screening:    General: Screening Not Indicated, History Lipid Disorder and Screening Current      Diabetes Screening:     General: Screening Current      Colorectal Cancer Screening:     General: Screening Current      Prostate Cancer Screening:    General: History Prostate Cancer and Screening Not Indicated      Osteoporosis Screening:    General: Screening Not Indicated      Abdominal Aortic Aneurysm (AAA) Screening:        General: Screening Not Indicated      Lung Cancer Screening:     General: Screening Not Indicated      Hepatitis C Screening:    General: Screening Not Indicated      Martha Carrizales MD

## 2020-09-29 NOTE — PROGRESS NOTES
Assessment and Plan:     Problem List Items Addressed This Visit     None      Visit Diagnoses     Immunization due    -  Primary           Preventive health issues were discussed with patient, and age appropriate screening tests were ordered as noted in patient's After Visit Summary  Personalized health advice and appropriate referrals for health education or preventive services given if needed, as noted in patient's After Visit Summary       History of Present Illness:     Patient presents for Medicare Annual Wellness visit    Patient Care Team:  Mckenna Greenwood MD as PCP - General (Family Medicine)  Brenda Covarrubias MD (Radiation Oncology)  Dion Yanes MD (Urology)     Problem List:     Patient Active Problem List   Diagnosis    Elevated PSA    Prostate cancer [facility])    Inguinal hernia    Non-recurrent unilateral inguinal hernia without obstruction or gangrene      Past Medical and Surgical History:     Past Medical History:   Diagnosis Date    Cancer ([address] )     skin    Elevated PSA     Hyperlipidemia     Narcolepsy     Pacemaker 1988    Symptomatic Bradycardia    Prostate cancer [facility])      Past Surgical History:   Procedure Laterality Date    ATRIAL CARDIAC PACEMAKER INSERTION      CARDIAC CATHETERIZATION  02/12/2015    COLONOSCOPY      INSERTION OF FIDUCIAL MARKER (TRANSRECTAL ULTRASOUND GUIDANCE) N/A 2/3/2020    Procedure: INSERTION OF FIDUCIAL MARKER (TRANSRECTAL ULTRASOUND GUIDANCE) Kulwinder Piedra;  Surgeon: Felipe Owens MD;  [facility];  Service: Urology    CA REPAIR [address] HERNIA,5+Y/O,REDUCIBL Left 12/20/2019    Procedure: INGUINAL HERNIA REPAIR;  Surgeon: Barbara Simeon DO;  [facility];  Service: General    SKIN CANCER EXCISION      left cheek      Family History:     Family History   Problem Relation Age of Onset    Coronary artery disease Mother     Coronary artery disease Father     Skin cancer Father     Hyperlipidemia Father     Cancer Father         bladder cancer    Coronary artery disease Brother       Social History:        Social History     Socioeconomic History    Marital status: /Civil Union     Spouse name: Not on file    Number of children: 3    Years of education: Not on file    Highest education level: Not on file   Occupational History    Not on file   Social Needs    Financial resource strain: Not on file    Food insecurity     Worry: Not on file     Inability: Not on file   [employer] needs     Medical: Not on file     Non-medical: Not on file   Tobacco Use    Smoking status: Never Smoker    Smokeless tobacco: Never Used   Substance and Sexual Activity    Alcohol use:  Yes     Alcohol/week: 7 0 standard drinks     Types: 7 Glasses of wine per week     Frequency: 4 or more times a week     Comment: 1 glass beer/wine daily    Drug use: No    Sexual activity: Yes   Lifestyle    Physical activity     Days per week: Not on file     Minutes per session: Not on file    Stress: Not on file   Relationships    Social connections     Talks on phone: Not on file     Gets together: Not on file     Attends Jainism service: Not on file     Active member of club or organization: Not on file     Attends meetings of clubs or organizations: Not on file     Relationship status: Not on file    Intimate partner violence     Fear of current or ex partner: Not on file     Emotionally abused: Not on file     Physically abused: Not on file     Forced sexual activity: Not on file   Other Topics Concern    Not on file   Social History Narrative    Not on file      Medications and Allergies:     Current Outpatient Medications   Medication Sig Dispense Refill    atorvastatin (LIPITOR) 20 mg tablet Take 1 tablet (20 mg total) by mouth daily in the early morning 90 tablet 3    cholecalciferol (VITAMIN D3) 1,000 units tablet Take 1,000 Units by mouth daily      Fluad Quadrivalent 0 5 ML [facility] ADMINISTERED      modafinil (PROVIGIL) 200 MG tablet Take 200 mg by mouth daily      sildenafil (REVATIO) 20 mg tablet Take 3 to 5 tablets one (1) hour prior to intercourse  Take no more than 5 tablets daily  90 tablet 3    tamsulosin (FLOMAX) 0 4 mg TAKE 1 CAPSULE BY MOUTH EVERY DAY WITH DINNER 30 capsule 1     No current facility-administered medications for this visit  No Known Allergies   Immunizations:     Immunization History   Administered Date(s) Administered    H1N1, All Formulations 01/19/2010    Hep A, adult 03/06/2014    INFLUENZA 10/09/2014, 11/25/2016, 10/05/2018, 10/30/2019, 09/15/2020    Influenza Split High Dose Preservative Free IM 10/29/2015    Influenza, high dose seasonal 0 7 mL 10/30/2019    Pneumococcal Conjugate 13-Valent 09/12/2017, 12/12/2019    Pneumococcal Polysaccharide PPV23 01/19/2010, 02/29/2016, 04/08/2016    Zoster 01/25/2010    Zoster Vaccine Recombinant 06/14/2018, 08/27/2018, 08/27/2018      Health Maintenance: There are no preventive care reminders to display for this patient  Topic Date Due    DTaP,Tdap,and Td Vaccines (1 - Tdap) 05/27/1965    Influenza Vaccine  07/01/2020      Medicare Health Risk Assessment:     There were no vitals taken for this visit  Candace Hoffmann is here for his Initial Wellness visit  Health Risk Assessment:   Patient rates overall health as good  Patient feels that their physical health rating is same  Eyesight was rated as same  Hearing was rated as same  Patient feels that their emotional and mental health rating is slightly worse  Patient states that he has experienced no weight loss or gain in last 6 months  Depression Screening:   PHQ-2 Score: 0      Fall Risk Screening: In the past year, patient has experienced: no history of falling in past year      Home Safety:  Patient does not have trouble with stairs inside or outside of their home  Patient has working smoke alarms and has working carbon monoxide detector  Home safety hazards include: none  Nutrition:   Current diet is Regular  Medications:   Patient is currently taking over-the-counter supplements  OTC medications include: see medication list  Patient is able to manage medications  Activities of Daily Living (ADLs)/Instrumental Activities of Daily Living (IADLs):   Walk and transfer into and out of bed and chair?: Yes  Dress and groom yourself?: Yes    Bathe or shower yourself?: Yes    Feed yourself?  Yes  Do your laundry/housekeeping?: Yes  Manage your money, pay your bills and track your expenses?: Yes  Make your own meals?: Yes    Do your own shopping?: Yes    PREVENTIVE SCREENINGS      Cardiovascular Screening:    General: Screening Not Indicated and History Lipid Disorder      Diabetes Screening:     General: Screening Current      Prostate Cancer Screening:    General: History Prostate Cancer and Screening Not Indicated      Lung Cancer Screening:     General: Screening Not Indicated      Harlan Rios MD

## 2020-09-30 ENCOUNTER — TREATMENT (OUTPATIENT)
Dept: FAMILY MEDICINE CLINIC | Facility: CLINIC | Age: 75
End: 2020-09-30

## 2020-09-30 DIAGNOSIS — D72.819 LEUKOPENIA, UNSPECIFIED TYPE: Primary | ICD-10-CM

## 2020-10-06 ENCOUNTER — OFFICE VISIT (OUTPATIENT)
Dept: CARDIOLOGY CLINIC | Facility: CLINIC | Age: 75
End: 2020-10-06
Payer: MEDICARE

## 2020-10-06 ENCOUNTER — IN-CLINIC DEVICE VISIT (OUTPATIENT)
Dept: CARDIOLOGY CLINIC | Facility: CLINIC | Age: 75
End: 2020-10-06
Payer: MEDICARE

## 2020-10-06 VITALS
HEART RATE: 51 BPM | DIASTOLIC BLOOD PRESSURE: 70 MMHG | WEIGHT: 133 LBS | BODY MASS INDEX: 20.16 KG/M2 | SYSTOLIC BLOOD PRESSURE: 120 MMHG | HEIGHT: 68 IN

## 2020-10-06 DIAGNOSIS — Z95.0 PACEMAKER: Primary | ICD-10-CM

## 2020-10-06 DIAGNOSIS — E78.2 MIXED HYPERLIPIDEMIA: ICD-10-CM

## 2020-10-06 DIAGNOSIS — I49.5 SSS (SICK SINUS SYNDROME) (HCC): Primary | ICD-10-CM

## 2020-10-06 DIAGNOSIS — Z95.0 PACEMAKER: ICD-10-CM

## 2020-10-06 DIAGNOSIS — I49.5 SICK SINUS SYNDROME (HCC): ICD-10-CM

## 2020-10-06 PROCEDURE — 93280 PM DEVICE PROGR EVAL DUAL: CPT | Performed by: INTERNAL MEDICINE

## 2020-10-06 PROCEDURE — 93000 ELECTROCARDIOGRAM COMPLETE: CPT | Performed by: INTERNAL MEDICINE

## 2020-10-06 PROCEDURE — 99212 OFFICE O/P EST SF 10 MIN: CPT | Performed by: INTERNAL MEDICINE

## 2020-10-07 ENCOUNTER — OFFICE VISIT (OUTPATIENT)
Dept: SURGERY | Facility: CLINIC | Age: 75
End: 2020-10-07
Payer: MEDICARE

## 2020-10-07 VITALS — WEIGHT: 140 LBS | BODY MASS INDEX: 21.22 KG/M2 | HEIGHT: 68 IN | TEMPERATURE: 97.2 F

## 2020-10-07 DIAGNOSIS — T14.8XXA MUSCLE STRAIN: Primary | ICD-10-CM

## 2020-10-07 DIAGNOSIS — R59.9 ADENOPATHY: ICD-10-CM

## 2020-10-07 PROCEDURE — 99213 OFFICE O/P EST LOW 20 MIN: CPT | Performed by: SURGERY

## 2020-11-17 ENCOUNTER — OFFICE VISIT (OUTPATIENT)
Dept: FAMILY MEDICINE CLINIC | Facility: CLINIC | Age: 75
End: 2020-11-17
Payer: MEDICARE

## 2020-11-17 VITALS
HEART RATE: 78 BPM | WEIGHT: 143.1 LBS | OXYGEN SATURATION: 100 % | BODY MASS INDEX: 21.69 KG/M2 | TEMPERATURE: 97.9 F | HEIGHT: 68 IN | DIASTOLIC BLOOD PRESSURE: 72 MMHG | SYSTOLIC BLOOD PRESSURE: 142 MMHG

## 2020-11-17 DIAGNOSIS — R59.1 LYMPHADENOPATHY: Primary | Chronic | ICD-10-CM

## 2020-11-17 LAB
BASOPHILS # BLD AUTO: 0.03 THOUSANDS/ΜL (ref 0–0.1)
BASOPHILS NFR BLD AUTO: 1 % (ref 0–1)
EOSINOPHIL # BLD AUTO: 0.1 THOUSAND/ΜL (ref 0–0.61)
EOSINOPHIL NFR BLD AUTO: 2 % (ref 0–6)
ERYTHROCYTE [DISTWIDTH] IN BLOOD BY AUTOMATED COUNT: 12.5 % (ref 11.6–15.1)
HCT VFR BLD AUTO: 39.7 % (ref 36.5–49.3)
HGB BLD-MCNC: 13.2 G/DL (ref 12–17)
IMM GRANULOCYTES # BLD AUTO: 0.02 THOUSAND/UL (ref 0–0.2)
IMM GRANULOCYTES NFR BLD AUTO: 1 % (ref 0–2)
LYMPHOCYTES # BLD AUTO: 0.84 THOUSANDS/ΜL (ref 0.6–4.47)
LYMPHOCYTES NFR BLD AUTO: 20 % (ref 14–44)
MCH RBC QN AUTO: 32.4 PG (ref 26.8–34.3)
MCHC RBC AUTO-ENTMCNC: 33.2 G/DL (ref 31.4–37.4)
MCV RBC AUTO: 98 FL (ref 82–98)
MONOCYTES # BLD AUTO: 0.46 THOUSAND/ΜL (ref 0.17–1.22)
MONOCYTES NFR BLD AUTO: 11 % (ref 4–12)
NEUTROPHILS # BLD AUTO: 2.86 THOUSANDS/ΜL (ref 1.85–7.62)
NEUTS SEG NFR BLD AUTO: 65 % (ref 43–75)
NRBC BLD AUTO-RTO: 0 /100 WBCS
PLATELET # BLD AUTO: 165 THOUSANDS/UL (ref 149–390)
PMV BLD AUTO: 10.1 FL (ref 8.9–12.7)
RBC # BLD AUTO: 4.07 MILLION/UL (ref 3.88–5.62)
WBC # BLD AUTO: 4.31 THOUSAND/UL (ref 4.31–10.16)

## 2020-11-17 PROCEDURE — 36415 COLL VENOUS BLD VENIPUNCTURE: CPT | Performed by: FAMILY MEDICINE

## 2020-11-17 PROCEDURE — 85025 COMPLETE CBC W/AUTO DIFF WBC: CPT | Performed by: FAMILY MEDICINE

## 2020-11-17 PROCEDURE — 99213 OFFICE O/P EST LOW 20 MIN: CPT | Performed by: FAMILY MEDICINE

## 2020-11-23 ENCOUNTER — LAB (OUTPATIENT)
Dept: LAB | Facility: HOSPITAL | Age: 75
End: 2020-11-23
Attending: RADIOLOGY
Payer: MEDICARE

## 2020-11-23 DIAGNOSIS — C61 PROSTATE CANCER (HCC): ICD-10-CM

## 2020-11-23 LAB — PSA SERPL-MCNC: 0.5 NG/ML (ref 0–4)

## 2020-11-23 PROCEDURE — 84153 ASSAY OF PSA TOTAL: CPT

## 2020-12-01 ENCOUNTER — TELEMEDICINE (OUTPATIENT)
Dept: RADIATION ONCOLOGY | Facility: CLINIC | Age: 75
End: 2020-12-01
Attending: RADIOLOGY

## 2020-12-01 DIAGNOSIS — C61 PROSTATE CANCER (HCC): Primary | ICD-10-CM

## 2020-12-01 RX ORDER — IBUPROFEN 200 MG
200 TABLET ORAL EVERY 6 HOURS PRN
COMMUNITY

## 2021-01-19 ENCOUNTER — IN-CLINIC DEVICE VISIT (OUTPATIENT)
Dept: CARDIOLOGY CLINIC | Facility: CLINIC | Age: 76
End: 2021-01-19
Payer: MEDICARE

## 2021-01-19 DIAGNOSIS — Z95.0 PACEMAKER: Primary | ICD-10-CM

## 2021-01-19 PROCEDURE — 93288 INTERROG EVL PM/LDLS PM IP: CPT | Performed by: INTERNAL MEDICINE

## 2021-01-19 NOTE — PROGRESS NOTES
Results for orders placed or performed in visit on 01/19/21   Cardiac EP device report    Narrative    SJM-DUAL CHAMBER PPM  (DDD MODE) - NOT MRI CONDITIONAL  DEVICE INTERROGATED IN THE [place] OFFICE/NO TEST  BATTERY ADEQUATE (1-1 25 YRS)  AP 18%;  19%  ALL AVAILABLE LEAD PARAMETERS WITHIN NORMAL LIMITS  NO EPISODES  NO PROGRAMMING CHANGES MADE TO DEVICE PARAMETERS  NORMAL DEVICE FUNCTION   PL

## 2021-03-03 ENCOUNTER — OFFICE VISIT (OUTPATIENT)
Dept: UROLOGY | Facility: AMBULATORY SURGERY CENTER | Age: 76
End: 2021-03-03
Payer: MEDICARE

## 2021-03-03 VITALS
HEIGHT: 68 IN | DIASTOLIC BLOOD PRESSURE: 72 MMHG | HEART RATE: 70 BPM | SYSTOLIC BLOOD PRESSURE: 120 MMHG | WEIGHT: 143 LBS | BODY MASS INDEX: 21.67 KG/M2

## 2021-03-03 DIAGNOSIS — N52.9 ERECTILE DYSFUNCTION, UNSPECIFIED ERECTILE DYSFUNCTION TYPE: Primary | ICD-10-CM

## 2021-03-03 DIAGNOSIS — N32.81 OVERACTIVE BLADDER: ICD-10-CM

## 2021-03-03 DIAGNOSIS — C61 PROSTATE CANCER (HCC): ICD-10-CM

## 2021-03-03 PROCEDURE — 99214 OFFICE O/P EST MOD 30 MIN: CPT | Performed by: UROLOGY

## 2021-03-03 RX ORDER — TADALAFIL 20 MG/1
20 TABLET ORAL DAILY PRN
Qty: 10 TABLET | Refills: 3 | Status: SHIPPED | OUTPATIENT
Start: 2021-03-03 | End: 2022-03-08

## 2021-03-03 RX ORDER — OXYBUTYNIN CHLORIDE 10 MG/1
10 TABLET, EXTENDED RELEASE ORAL
Qty: 30 TABLET | Refills: 11 | Status: SHIPPED | OUTPATIENT
Start: 2021-03-03 | End: 2022-03-08

## 2021-03-03 NOTE — PROGRESS NOTES
3/3/2021    Helena Cole  1945  [id number]        Assessment  [place] s/p radiation tx completed 4/22/20  Lab Results   Component Value Date    PSA 0 5 11/23/2020    PSA 1 6 07/22/2020    PSA 8 7 (H) 02/24/2020     Erectile Dysfunction  Worsening urinary frequency      Plan    We discussed multiple ED treatments including oral PDE5 inhibitors, intracavernosal injections, vacuum pump, and surgical implant  He does not think he would do anything invasive, however he is interested in additional oral therapy, possibly pump if this is not successful  We discussed the possibility of  using both oral medication and vacuum pump simultaneously  He would like to start Cialis 20 mg  Discussed potential side effects  Regarding his urinary symptoms, we discussed medication  Discussed potential side effects of anticholinergic therapy  He wishes to try this  Start oxybutynin 10 mg qd for frequency  Complete PSA prior to f/u w/rad onc  Follow-up here in 1 year  He may call with any concerns or problems with his medications  Total visit time was 25 minutes of which over 50% was spent on counseling  History of Present Illness  Irene Espinosa is a 67 y o  male w/ PMHx significant for [place] 7 s/p radiation tx completed 4/22/20 presents today for worsening ED and urinary frequency since radiation  He does not endorse any additional urinary sx  He reports he has continued his sildenafil s/p radiation and has minimal response  Previously responded very well  No additional concerns, has f/u scheduled scheduled w/ rad/onc in June  He does report that symptoms prior to radiation therapy, however it is worse now  He has not been treated for this in the past         Review of Systems  Review of Systems   Constitutional: Negative  HENT: Negative  Respiratory: Negative  Cardiovascular: Negative  Gastrointestinal: Negative  Genitourinary:        As per HPI   Musculoskeletal: Negative      Skin: Negative  Neurological: Negative  Hematological: Negative  Past Medical History  Past Medical History:   Diagnosis Date    Cancer ([address] )     skin    Elevated PSA     Hyperlipidemia     Narcolepsy     Pacemaker 1988    Symptomatic Bradycardia    Prostate cancer [facility])        Past Social History  Past Surgical History:   Procedure Laterality Date    ATRIAL CARDIAC PACEMAKER INSERTION      CARDIAC CATHETERIZATION  02/12/2015    COLONOSCOPY      INSERTION OF FIDUCIAL MARKER (TRANSRECTAL ULTRASOUND GUIDANCE) N/A 2/3/2020    Procedure: INSERTION OF FIDUCIAL MARKER (TRANSRECTAL ULTRASOUND GUIDANCE) SPACEOAR;  Surgeon: Meghana Hutchinson MD;  [facility];  Service: Urology    CT REPAIR [address] HERNIA,5+Y/O,REDUCIBL Left 12/20/2019    Procedure: INGUINAL HERNIA REPAIR;  Surgeon: Nelly Triana DO;  [facility];  Service: General    SKIN CANCER EXCISION      left cheek       Past Family History  Family History   Problem Relation Age of Onset    Coronary artery disease Mother     Coronary artery disease Father     Skin cancer Father     Hyperlipidemia Father     Cancer Father         bladder cancer    Coronary artery disease Brother        Past Social history  Social History     Socioeconomic History    Marital status: /Civil Union     Spouse name: Not on file    Number of children: 3    Years of education: Not on file    Highest education level: Not on file   Occupational History    Not on file   Social Needs    Financial resource strain: Not on file    Food insecurity     Worry: Not on file     Inability: Not on file   [employer] needs     Medical: Not on file     Non-medical: Not on file   Tobacco Use    Smoking status: Never Smoker    Smokeless tobacco: Never Used   Substance and Sexual Activity    Alcohol use:  Yes     Alcohol/week: 7 0 standard drinks     Types: 7 Glasses of wine per week     Frequency: 4 or more times a week     Comment: 1 glass beer/wine daily    Drug use: No    Sexual activity: Not Currently     Comment: medication not effective since radiation  Has ED  Lifestyle    Physical activity     Days per week: Not on file     Minutes per session: Not on file    Stress: Not on file   Relationships    Social connections     Talks on phone: Not on file     Gets together: Not on file     Attends Mandaen service: Not on file     Active member of club or organization: Not on file     Attends meetings of clubs or organizations: Not on file     Relationship status: Not on file    Intimate partner violence     Fear of current or ex partner: Not on file     Emotionally abused: Not on file     Physically abused: Not on file     Forced sexual activity: Not on file   Other Topics Concern    Not on file   Social History Narrative    Not on file     Social History     Tobacco Use   Smoking Status Never Smoker   Smokeless Tobacco Never Used       Current Medications  Current Outpatient Medications   Medication Sig Dispense Refill    atorvastatin (LIPITOR) 20 mg tablet Take 1 tablet (20 mg total) by mouth daily in the early morning 90 tablet 3    cholecalciferol (VITAMIN D3) 1,000 units tablet Take 1,000 Units by mouth daily      ibuprofen (MOTRIN) 200 mg tablet Take 200 mg by mouth every 6 (six) hours as needed for mild pain      modafinil (PROVIGIL) 200 MG tablet Take 200 mg by mouth daily      sildenafil (REVATIO) 20 mg tablet Take 3 to 5 tablets one (1) hour prior to intercourse  Take no more than 5 tablets daily  90 tablet 3     No current facility-administered medications for this visit  Allergies  No Known Allergies    Past Medical History, Social History, Family History, medications and allergies were reviewed  Vitals  Vitals:    03/03/21 1431   BP: 120/72   Pulse: 70   Weight: 64 9 kg (143 lb)   Height: 5' 8" (1 727 m)       Physical Exam  Physical Exam  Vitals signs reviewed  Constitutional:       Appearance: He is well-developed  HENT:      Head: Normocephalic and atraumatic  Eyes:      Conjunctiva/sclera: Conjunctivae normal    Cardiovascular:      Rate and Rhythm: Normal rate  Pulmonary:      Effort: Pulmonary effort is normal    Musculoskeletal: Normal range of motion  Skin:     General: Skin is warm and dry  Neurological:      Mental Status: He is alert and oriented to person, place, and time     Psychiatric:         Mood and Affect: Mood normal            Results  Lab Results   Component Value Date    PSA 0 5 11/23/2020    PSA 1 6 07/22/2020    PSA 8 7 (H) 02/24/2020     Lab Results   Component Value Date    CALCIUM 8 2 (L) 09/14/2020    K 4 3 09/14/2020    CO2 29 09/14/2020     09/14/2020    BUN 13 09/14/2020    CREATININE 0 83 09/14/2020     Lab Results   Component Value Date    WBC 4 31 11/17/2020    HGB 13 2 11/17/2020    HCT 39 7 11/17/2020    MCV 98 11/17/2020     11/17/2020

## 2021-05-18 ENCOUNTER — IN-CLINIC DEVICE VISIT (OUTPATIENT)
Dept: CARDIOLOGY CLINIC | Facility: CLINIC | Age: 76
End: 2021-05-18
Payer: MEDICARE

## 2021-05-18 DIAGNOSIS — Z95.0 CARDIAC PACEMAKER IN SITU: Primary | ICD-10-CM

## 2021-05-18 PROCEDURE — 93280 PM DEVICE PROGR EVAL DUAL: CPT | Performed by: INTERNAL MEDICINE

## 2021-05-18 NOTE — PROGRESS NOTES
Results for orders placed or performed in visit on 05/18/21   Cardiac EP device report    Narrative    SJM-DUAL CHAMBER PPM  (DDD MODE) - NOT MRI CONDITIONAL  DEVICE INTERROGATED IN THE [place] OFFICE  BATTERY VOLTAGE ADEQUATE (1-2 25 YRS)  AP-17%, -18%  ALL LEAD PARAMETERS WITHIN NORMAL LIMITS  NO SIGNIFICANT HIGH RATE EPISODES  NORMAL DEVICE FUNCTION   GV

## 2021-05-24 ENCOUNTER — LAB (OUTPATIENT)
Dept: LAB | Facility: HOSPITAL | Age: 76
End: 2021-05-24
Attending: RADIOLOGY
Payer: MEDICARE

## 2021-05-24 DIAGNOSIS — C61 PROSTATE CANCER (HCC): ICD-10-CM

## 2021-05-24 LAB — PSA SERPL-MCNC: 0.3 NG/ML (ref 0–4)

## 2021-05-24 PROCEDURE — 84153 ASSAY OF PSA TOTAL: CPT

## 2021-06-15 ENCOUNTER — CLINICAL SUPPORT (OUTPATIENT)
Dept: RADIATION ONCOLOGY | Facility: CLINIC | Age: 76
End: 2021-06-15
Attending: RADIOLOGY
Payer: MEDICARE

## 2021-06-15 VITALS
HEART RATE: 57 BPM | BODY MASS INDEX: 21.78 KG/M2 | TEMPERATURE: 98.3 F | RESPIRATION RATE: 16 BRPM | OXYGEN SATURATION: 99 % | DIASTOLIC BLOOD PRESSURE: 78 MMHG | SYSTOLIC BLOOD PRESSURE: 128 MMHG | WEIGHT: 143.74 LBS | HEIGHT: 68 IN

## 2021-06-15 DIAGNOSIS — C61 PROSTATE CANCER (HCC): Primary | ICD-10-CM

## 2021-06-15 PROCEDURE — 99211 OFF/OP EST MAY X REQ PHY/QHP: CPT | Performed by: RADIOLOGY

## 2021-06-15 RX ORDER — CHOLECALCIFEROL (VITAMIN D3) 25 MCG
TABLET,CHEWABLE ORAL DAILY
COMMUNITY

## 2021-06-15 NOTE — PROGRESS NOTES
Follow-up - Radiation Oncology   Sergiobrenda Angel 1945 67 y o  male [id number]      History of Present Illness   Cancer Staging  No matching staging information was found for the patient  Interval History:  Bonny Nichole is X 86 year old male with adenocarcinoma [place] score 7 (3+ 4) of the prostate  He was initially diagnosed with [place] 6 (3+3) prostate cancer in 11/2018 and was on active surveillance  He had repeat prostate biopsy Sept 2019, revealing [place] 7 (3+4) disease  He had 50% of his cores positive for malignancy placing him in the intermediate risk, unfavorable category  Definitive treatment was recommended       He completed a course of definitive radiation therapy to the prostate on 4/22/20  He was last seen for telemedicine visit on 12/1/20      Component PSA, Total   Latest Ref Rng & Units 0 0 - 4 0 ng/mL   6/3/2019 10 5 (H)   2/24/2020 8 7 (H)   7/22/2020 1 6   11/23/2020 0 5   5/24/2021 0 3         3/3/21 Dr Ela Maradiaga f/u  C/o worsening ED and urinary frequency since radiation  Minimal response to sildenafil  Change to Cialis  Start oxybutynin 10 mg daily for frequency  Complete PSA prior to f/u w/rad onc         3/8/22 Dr Ramey [facility]   Oncology History   Prostate cancer [facility])   11/5/2018 Initial Diagnosis    Prostate cancer ([facility] [address] )     11/5/2018 Biopsy    A  Prostate, Right Peripheral Zone, Biopsy:  - Prostatic adenocarcinoma, Hi Pattern 3 + 3 (Hi Score 6), Grade Group 1; involving 4 of 6 cores, 20 5 linear mm of carcinoma (approximately 50% of total tissue)      B  Prostate, Right Central Zone, Biopsy:  - Prostatic adenocarcinoma, [place] Pattern 3 + 3 (Hi Score 6), Grade Group 1; involving 1 of 2 cores, 9 linear mm of carcinoma (approximately 30% of total tissue)      C  Prostate, Left Peripheral Zone, Biopsy:  - Atypical small acinar proliferation (ASAP), focally present in 1 of 2 cores      D   Prostate, Left Central Zone, Biopsy:  - Prostatic adenocarcinoma, Jessie Pattern 3 + 3 (Hi Score 6), Grade Group 1; discontinuously involving 4 of 4 cores, 9 linear mm of carcinoma (approximately 20% of total tissue)  - High-grade prostatic intraepithelial neoplasia (HGPIN)  Dr Aisha Gil       9/12/2019 Biopsy    A  Right lateral base prostate core biopsy:  - Adenocarcinoma, acinar type, Hi score 3+4=7, Grade Group 2  - Pattern 4 - 5% of tumor  - Continuously involving 80% of core  - No perineural invasion seen     B  Right lateral mid prostate core biopsy:  - Benign prostatic tissue     C  Right lateral apex prostate core biopsy:  - Benign prostatic tissue     D  Right medial base prostate core biopsy:  - Adenocarcinoma, acinar type, Jessie score 3+4=7, Grade Group 2  - Pattern 4 - 10% of tumor  - Discontinuously involving 45% of core  - Perineural invasion is seen     E  Right medial mid prostate core biopsy:  - Adenocarcinoma, acinar type, Jessie score 3+4=7, Grade Group 2  - Pattern 4 - 10% of tumor  - Continuously involving 10% of core  - No perineural invasion seen     F  Right medial apex prostate core biopsy:  - Benign prostatic tissue     G  Left medial base  Prostate core biopsy:  - Adenocarcinoma, acinar type, Jessie score 3+4=7, Grade Group 2  - Pattern 4 - 25% of tumor  - Continuously involving 5% of core  - No perineural invasion seen     H  Left medial mid prostate core biopsy:  - Benign prostatic tissue     I  Left medial apex prostate core bopsy:  - Benign prostatic tissue     J  Left lateral base prostate core biopsy:   - Adenocarcinoma, acinar type, Ih score 3+4=7, Grade Group 2  - Pattern 4 - 20% of tumor  - Discontinuously involving 25% of core  - No perineural invasion seen     K  Left lateral mid prostate core biopsy:   - Adenocarcinoma, acinar type, Hi score 3+4=7, Grade Group 2  - Pattern 4 - 5% of tumor  - Continuously involving 10% of core  - No perineural invasion seen     L   Left lateral apex prostate core biopsy:  - Benign prostatic tissue     2/20/2020 - 4/22/2020 Radiation    Plan ID Energy Fractions Dose per Fraction (cGy) Dose Correction (cGy) Total Dose Delivered (cGy) Elapsed Days   Prost ProxSV 10X 31 / 44 180 0 5,580 43   Rev ProstPSV 10X 13 / 13 180 0 2,340 16              Past Medical History:   Diagnosis Date    Cancer ([address] )     skin    Elevated PSA     Hyperlipidemia     Narcolepsy     Pacemaker 1988    Symptomatic Bradycardia    Prostate cancer [facility])      Past Surgical History:   Procedure Laterality Date    ATRIAL CARDIAC PACEMAKER INSERTION      CARDIAC CATHETERIZATION  02/12/2015    COLONOSCOPY      INSERTION OF FIDUCIAL MARKER (TRANSRECTAL ULTRASOUND GUIDANCE) N/A 2/3/2020    Procedure: INSERTION OF FIDUCIAL MARKER (TRANSRECTAL ULTRASOUND GUIDANCE) Yvonne Myers;  Surgeon: Gerson Foss MD;  Location[facility];  Service: Urology    [place] REPAIR [address] HERNIA,5+Y/O,REDUCIBL Left 12/20/2019    Procedure: INGUINAL HERNIA REPAIR;  Surgeon: Africa Nj DO;  Location: [facility];  Service: General    SKIN CANCER EXCISION      left cheek       Social History   Social History     Substance and Sexual Activity   Alcohol Use Yes    Alcohol/week: 7 0 standard drinks    Types: 7 Glasses of wine per week    Comment: 1 glass beer/wine daily     Social History     Substance and Sexual Activity   Drug Use No     Social History     Tobacco Use   Smoking Status Never Smoker   Smokeless Tobacco Never Used         Meds/Allergies     Current Outpatient Medications:     atorvastatin (LIPITOR) 20 mg tablet, Take 1 tablet (20 mg total) by mouth daily in the early morning, Disp: 90 tablet, Rfl: 3    cholecalciferol (VITAMIN D3) 1,000 units tablet, Take 1,000 Units by mouth daily, Disp: , Rfl:     Cyanocobalamin (B-12) 1000 MCG CAPS, Take by mouth daily, Disp: , Rfl:     ibuprofen (MOTRIN) 200 mg tablet, Take 200 mg by mouth every 6 (six) hours as needed for mild pain, Disp: , Rfl:     modafinil (PROVIGIL) 200 MG tablet, Take 200 mg by mouth daily, Disp: , Rfl:     tadalafil (CIALIS) 20 MG tablet, Take 1 tablet (20 mg total) by mouth daily as needed for erectile dysfunction, Disp: 10 tablet, Rfl: 3    oxybutynin (DITROPAN-XL) 10 MG 24 hr tablet, Take 1 tablet (10 mg total) by mouth daily at bedtime, Disp: 30 tablet, Rfl: 11  No Known Allergies      Review of Systems   Constitutional: Negative  Negative for appetite change, fatigue and unexpected weight change  HENT: Negative  Eyes: Negative  Respiratory: Negative  Negative for chest tightness and shortness of breath  Cardiovascular: Negative  Negative for chest pain  Gastrointestinal: Negative  Negative for blood in stool, constipation and diarrhea  Intermittent  discomfort in the left groin, noted since the summer  Has seen surgeon,post hernia repair left side  Per pt , he felt a lymph node was palpable, and advised Advil prn  Which does help  Endocrine: Negative  Genitourinary: Positive for urgency  Negative for difficulty urinating, dysuria and hematuria  Nocturia averages x3, stable, with incomplete emptying only at night   Has worsened ED since radiation  Musculoskeletal: Negative  Skin: Negative  Allergic/Immunologic: Negative  Neurological: Negative  Hematological: Negative  Psychiatric/Behavioral: Negative           Vitals       Vitals:     06/15/21 1352   Resp: 16   Weight: 65 2 kg (143 lb 11 8 oz)   Height: 5' 8" (1 727 m)               IPSS Questionnaire (AUA-7): Over the past month     1)  How often have you had a sensation of not emptying your bladder completely after you finish urinating? 4 - More than half the time   2)  How often have you had to urinate again less than two hours after you finished urinating? 3 - About half the time   3)  How often have you found you stopped and started again several times when you urinated?   0 - Not at all   4) How difficult have you found it to postpone urination? 0 - Not at all   5) How often have you had a weak urinary stream?  1 - Less than 1 time in 5   6) How often have you had to push or strain to begin urination? 0 - Not at all   7) How many times did you most typically get up to urinate from the time you went to bed until the time you got up in the morning? 5 - 5+ times   Total Score:  13            OBJECTIVE:   /78   Pulse 57   Temp 98 3 °F (36 8 °C) (Temporal)   Resp 16   Ht 5' 8" (1 727 m)   Wt 65 2 kg (143 lb 11 8 oz)   SpO2 99%   BMI 21 86 kg/m²   Pain Assessment:  0  ECOG/Zubrod/WHO: 0 - Asymptomatic    Physical Exam     He is conversing appropriately  His breathing is unlabored  Ambulating independently  RESULTS    Lab Results:   Recent Results (from the past 672 hour(s))   PSA Total, Diagnostic    Collection Time: 05/24/21  8:36 AM   Result Value Ref Range    PSA, Diagnostic 0 3 0 0 - 4 0 ng/mL       Imaging Studies:Cardiac EP device report    Result Date: 5/18/2021  Narrative: SJM-DUAL CHAMBER PPM  (DDD MODE) - NOT MRI CONDITIONAL DEVICE INTERROGATED IN THE [place] OFFICE  BATTERY VOLTAGE ADEQUATE (1-2 25 YRS)  AP-17%, -18%  ALL LEAD PARAMETERS WITHIN NORMAL LIMITS  NO SIGNIFICANT HIGH RATE EPISODES  NORMAL DEVICE FUNCTION  GV          Assessment/Plan:  No orders of the defined types were placed in this encounter  Valentina Guan is a 67y o  year old male   Who is 14 months status post definitive radiation therapy for [place] 7 favorable risk prostate carcinoma  He has had good biochemical response with this PSA returning at 0 3  He also underwent PSA at the [place] with this PSA 0 2 recently  He has no significant post radiation sequelae  I have ordered follow-up PSA in 6 months and will return for follow-up visit thereafter        Joya Danielson MD  6/15/2021,2:19 PM    Portions of the record may have been created with voice recognition software   Occasional wrong word or "sound a like" substitutions may have occurred due to the inherent limitations of voice recognition software   Read the chart carefully and recognize, using context, where substitutions have occurred

## 2021-06-15 NOTE — PROGRESS NOTES
Isela Barton 1945 is a 67 y o  male       Follow up visit     Isela Barton is a 67year old male with adenocarcinoma Hi score 7 (3+ 4) of the prostate  He was initially diagnosed with Hi 6 (3+3) prostate cancer in 11/2018 and was on active surveillance  He had repeat prostate biopsy Sept 2019, revealing [place] 7 (3+4) disease  He had 50% of his cores positive for malignancy placing him in the intermediate risk, unfavorable category  Definitive treatment was recommended  He completed a course of definitive radiation therapy to the prostate on 4/22/20  He was last seen for telemedicine visit on 12/1/20  Component PSA, Total   Latest Ref Rng & Units 0 0 - 4 0 ng/mL   6/3/2019 10 5 (H)   2/24/2020 8 7 (H)   7/22/2020 1 6   11/23/2020 0 5   5/24/2021 0 3       3/3/21 Dr Otis Kruse f/u  C/o worsening ED and urinary frequency since radiation  Minimal response to sildenafil  Change to Cialis  Start oxybutynin 10 mg daily for frequency  Complete PSA prior to f/u w/rad onc  3/8/22 Dr Otis Kruse       Oncology History   Prostate cancer [facility])   11/5/2018 Initial Diagnosis    Prostate cancer ([facility] [address] )     11/5/2018 Biopsy    A  Prostate, Right Peripheral Zone, Biopsy:  - Prostatic adenocarcinoma, [place] Pattern 3 + 3 (Warren Score 6), Grade Group 1; involving 4 of 6 cores, 20 5 linear mm of carcinoma (approximately 50% of total tissue)      B  Prostate, Right Central Zone, Biopsy:  - Prostatic adenocarcinoma, Hi Pattern 3 + 3 (Warren Score 6), Grade Group 1; involving 1 of 2 cores, 9 linear mm of carcinoma (approximately 30% of total tissue)      C  Prostate, Left Peripheral Zone, Biopsy:  - Atypical small acinar proliferation (ASAP), focally present in 1 of 2 cores      D   Prostate, Left Central Zone, Biopsy:  - Prostatic adenocarcinoma, Hi Pattern 3 + 3 (Warren Score 6), Grade Group 1; discontinuously involving 4 of 4 cores, 9 linear mm of carcinoma (approximately 20% of total 27 yo female with hx frequent UTIs recently seen 2+ weeks ago for similar, finished 10 day of course of cefuroxime presents c/o dysuria with urinary frequency over the past few days. no flank pain or vomiting. no fever. no pelvic pain or abdominal pain. has not followed up with urology yet. Had CT imaging last visit that showed bicornute uterus otherwise unremarkable, past urine culture showed +e coli, sens to cephalosporins and macrobid, resistant to sulfa and fluoroquinolones tissue)  - High-grade prostatic intraepithelial neoplasia (HGPIN)  Dr Christi William       9/12/2019 Biopsy    A  Right lateral base prostate core biopsy:  - Adenocarcinoma, acinar type, [place] score 3+4=7, Grade Group 2  - Pattern 4 - 5% of tumor  - Continuously involving 80% of core  - No perineural invasion seen     B  Right lateral mid prostate core biopsy:  - Benign prostatic tissue     C  Right lateral apex prostate core biopsy:  - Benign prostatic tissue     D  Right medial base prostate core biopsy:  - Adenocarcinoma, acinar type, [place] score 3+4=7, Grade Group 2  - Pattern 4 - 10% of tumor  - Discontinuously involving 45% of core  - Perineural invasion is seen     E  Right medial mid prostate core biopsy:  - Adenocarcinoma, acinar type, [place] score 3+4=7, Grade Group 2  - Pattern 4 - 10% of tumor  - Continuously involving 10% of core  - No perineural invasion seen     F  Right medial apex prostate core biopsy:  - Benign prostatic tissue     G  Left medial base  Prostate core biopsy:  - Adenocarcinoma, acinar type, [place] score 3+4=7, Grade Group 2  - Pattern 4 - 25% of tumor  - Continuously involving 5% of core  - No perineural invasion seen     H  Left medial mid prostate core biopsy:  - Benign prostatic tissue     I  Left medial apex prostate core bopsy:  - Benign prostatic tissue     J  Left lateral base prostate core biopsy:   - Adenocarcinoma, acinar type, Hi score 3+4=7, Grade Group 2  - Pattern 4 - 20% of tumor  - Discontinuously involving 25% of core  - No perineural invasion seen     K  Left lateral mid prostate core biopsy:   - Adenocarcinoma, acinar type, Hi score 3+4=7, Grade Group 2  - Pattern 4 - 5% of tumor  - Continuously involving 10% of core  - No perineural invasion seen     L   Left lateral apex prostate core biopsy:  - Benign prostatic tissue     2/20/2020 - 4/22/2020 Radiation    Plan ID Energy Fractions Dose per Fraction (cGy) Dose Correction (cGy) Total Dose Delivered (cGy) Elapsed Days   Prost ProxSV 10X 31 / 44 180 0 5,580 43   Rev ProstPSV 10X 13 / 13 180 0 2,340 16              Clinical Trial: no      Health Maintenance   Topic Date Due    Hepatitis C Screening  Never done    DTaP,Tdap,and Td Vaccines (1 - Tdap) Never done    Depression Screening PHQ  12/01/2021    Fall Risk  09/29/2021    Medicare Annual Wellness Visit (AWV)  09/29/2021    BMI: Adult  03/03/2022    Pneumococcal Vaccine: 65+ Years  Completed    Influenza Vaccine  Completed    COVID-19 Vaccine  Completed    HIB Vaccine  Aged Out    Hepatitis B Vaccine  Aged Out    IPV Vaccine  Aged Out    Hepatitis A Vaccine  Aged Out    Meningococcal ACWY Vaccine  Aged Out    HPV Vaccine  Aged Out       Patient Active Problem List   Diagnosis    Elevated PSA    Prostate cancer ([facility] [address] )    Non-recurrent unilateral inguinal hernia without obstruction or gangrene    Leukopenia    Sick sinus syndrome ([facility] [address] )    Mixed hyperlipidemia    SSS (sick sinus syndrome) ([facility] [address] )    Pacemaker    Lymphadenopathy     Past Medical History:   Diagnosis Date    Cancer ([facility] [address] )     skin    Elevated PSA     Hyperlipidemia     Narcolepsy     Pacemaker 1988    Symptomatic Bradycardia    Prostate cancer [facility])      Past Surgical History:   Procedure Laterality Date    ATRIAL CARDIAC PACEMAKER INSERTION      CARDIAC CATHETERIZATION  02/12/2015    COLONOSCOPY      INSERTION OF FIDUCIAL MARKER (TRANSRECTAL ULTRASOUND GUIDANCE) N/A 2/3/2020    Procedure: INSERTION OF FIDUCIAL MARKER (TRANSRECTAL ULTRASOUND GUIDANCE) Gary Murphy;  Surgeon: Geoff Chou MD;  Location: [facility];  Service: Urology    [place] REPAIR [address] HERNIA,5+Y/O,REDUCIBL Left 12/20/2019    Procedure: INGUINAL HERNIA REPAIR;  Surgeon: Liliana Stanley DO;  Location[facility];  Service: General    SKIN CANCER EXCISION      left cheek     Family History   Problem Relation Age of Onset    Coronary artery disease Mother     Coronary artery disease Father    Candace Faye Skin cancer Father     Hyperlipidemia Father     Cancer Father         bladder cancer    Coronary artery disease Brother      Social History     Socioeconomic History    Marital status: /Civil Union     Spouse name: Not on file    Number of children: 3    Years of education: Not on file    Highest education level: Not on file   Occupational History    Not on file   Tobacco Use    Smoking status: Never Smoker    Smokeless tobacco: Never Used   Vaping Use    Vaping Use: Never used   Substance and Sexual Activity    Alcohol use: Yes     Alcohol/week: 7 0 standard drinks     Types: 7 Glasses of wine per week     Comment: 1 glass beer/wine daily    Drug use: No    Sexual activity: Not Currently     Comment: medication not effective since radiation  Has ED  Other Topics Concern    Not on file   Social History Narrative    Not on file     Social Determinants of Health     Financial Resource Strain:     Difficulty of Paying Living Expenses:    Food Insecurity:     Worried About Running Out of Food in the Last Year:     [address] in the Last Year:    Transportation Needs:     Lack of Transportation (Medical):      Lack of Transportation (Non-Medical):    Physical Activity:     Days of Exercise per Week:     Minutes of Exercise per Session:    Stress:     Feeling of Stress :    Social Connections:     Frequency of Communication with Friends and Family:     Frequency of Social Gatherings with Friends and Family:     Attends Confucianism Services:     Active Member of Clubs or Organizations:     Attends Club or Organization Meetings:     Marital Status:    Intimate Partner Violence:     Fear of Current or Ex-Partner:     Emotionally Abused:     Physically Abused:     Sexually Abused:        Current Outpatient Medications:     atorvastatin (LIPITOR) 20 mg tablet, Take 1 tablet (20 mg total) by mouth daily in the early morning, Disp: 90 tablet, Rfl: 3    cholecalciferol (VITAMIN D3) 1,000 units tablet, Take 1,000 Units by mouth daily, Disp: , Rfl:     ibuprofen (MOTRIN) 200 mg tablet, Take 200 mg by mouth every 6 (six) hours as needed for mild pain, Disp: , Rfl:     modafinil (PROVIGIL) 200 MG tablet, Take 200 mg by mouth daily, Disp: , Rfl:     oxybutynin (DITROPAN-XL) 10 MG 24 hr tablet, Take 1 tablet (10 mg total) by mouth daily at bedtime, Disp: 30 tablet, Rfl: 11    tadalafil (CIALIS) 20 MG tablet, Take 1 tablet (20 mg total) by mouth daily as needed for erectile dysfunction, Disp: 10 tablet, Rfl: 3  No Known Allergies    Review of Systems:  Review of Systems   Constitutional: Negative  Negative for appetite change, fatigue and unexpected weight change  HENT: Negative  Eyes: Negative  Respiratory: Negative  Negative for chest tightness and shortness of breath  Cardiovascular: Negative  Negative for chest pain  Gastrointestinal: Negative  Negative for blood in stool, constipation and diarrhea  Intermittent  discomfort in the left groin, noted since the summer  Has seen surgeon,post hernia repair left side  Per pt , he felt a lymph node was palpable, and advised Advil prn  Which does help  Endocrine: Negative  Genitourinary: Positive for urgency  Negative for difficulty urinating, dysuria and hematuria  Nocturia averages x3, stable, with incomplete emptying only at night   Has worsened ED since radiation  Musculoskeletal: Negative  Skin: Negative  Allergic/Immunologic: Negative  Neurological: Negative  Hematological: Negative  Psychiatric/Behavioral: Negative  Vitals:    06/15/21 1352   Resp: 16   Weight: 65 2 kg (143 lb 11 8 oz)   Height: 5' 8" (1 727 m)             IPSS Questionnaire (AUA-7): Over the past month    1)  How often have you had a sensation of not emptying your bladder completely after you finish urinating?   4 - More than half the time   2)  How often have you had to urinate again less than two hours after you finished urinating? 3 - About half the time   3)  How often have you found you stopped and started again several times when you urinated? 0 - Not at all   4) How difficult have you found it to postpone urination? 0 - Not at all   5) How often have you had a weak urinary stream?  1 - Less than 1 time in 5   6) How often have you had to push or strain to begin urination? 0 - Not at all   7) How many times did you most typically get up to urinate from the time you went to bed until the time you got up in the morning? 5 - 5+ times   Total Score:  13         Imaging:Cardiac EP device report    Result Date: 5/18/2021  Narrative: SJM-DUAL CHAMBER PPM  (DDD MODE) - NOT MRI CONDITIONAL DEVICE INTERROGATED IN THE [facility]  BATTERY VOLTAGE ADEQUATE (1-2 25 YRS)  AP-17%, -18%  ALL LEAD PARAMETERS WITHIN NORMAL LIMITS  NO SIGNIFICANT HIGH RATE EPISODES  NORMAL DEVICE FUNCTION   [address] [facility] 27 yo female with hx frequent UTIs recently seen 2+ weeks ago for similar, finished 10 day of course of cefuroxime presents c/o dysuria with urinary frequency over the past few days. no flank pain or vomiting. no fever. no pelvic pain or abdominal pain. has not followed up with urology yet. Had CT imaging last visit that showed bicornute uterus otherwise unremarkable, past urine culture showed +e coli, sens to cephalosporins and macrobid, resistant to sulfa and fluoroquinolones. has been taking azo and cranberry supplements

## 2021-09-02 ENCOUNTER — IN-CLINIC DEVICE VISIT (OUTPATIENT)
Dept: CARDIOLOGY CLINIC | Facility: CLINIC | Age: 76
End: 2021-09-02
Payer: MEDICARE

## 2021-09-02 DIAGNOSIS — Z95.0 CARDIAC PACEMAKER IN SITU: Primary | ICD-10-CM

## 2021-09-02 PROCEDURE — 93288 INTERROG EVL PM/LDLS PM IP: CPT | Performed by: INTERNAL MEDICINE

## 2021-09-02 NOTE — PROGRESS NOTES
Results for orders placed or performed in visit on 09/02/21   Cardiac EP device report    Narrative    SJM-DUAL CHAMBER PPM  (DDD MODE) - NOT MRI CONDITIONAL  DEVICE INTERROGATED IN THE [place] OFFICE- NO TEST  BATTERY VOLTAGE ADEQUATE (0 75-2 YRS)  AP-13%, -14%  ALL AVAILABLE LEAD PARAMETERS WITHIN NORMAL LIMITS  NO SIGNIFICANT HIGH RATE EPISODES  NORMAL DEVICE FUNCTION   GV

## 2021-09-10 DIAGNOSIS — E78.5 HYPERLIPIDEMIA: ICD-10-CM

## 2021-09-10 RX ORDER — ATORVASTATIN CALCIUM 20 MG/1
TABLET, FILM COATED ORAL
Qty: 90 TABLET | Refills: 3 | Status: SHIPPED | OUTPATIENT
Start: 2021-09-10 | End: 2022-07-05

## 2021-10-11 ENCOUNTER — OFFICE VISIT (OUTPATIENT)
Dept: CARDIOLOGY CLINIC | Facility: CLINIC | Age: 76
End: 2021-10-11
Payer: MEDICARE

## 2021-10-11 VITALS
SYSTOLIC BLOOD PRESSURE: 102 MMHG | DIASTOLIC BLOOD PRESSURE: 60 MMHG | HEART RATE: 59 BPM | HEIGHT: 68 IN | BODY MASS INDEX: 21.82 KG/M2 | WEIGHT: 144 LBS

## 2021-10-11 DIAGNOSIS — Z95.0 PACEMAKER: ICD-10-CM

## 2021-10-11 DIAGNOSIS — E78.2 MIXED HYPERLIPIDEMIA: ICD-10-CM

## 2021-10-11 DIAGNOSIS — I49.5 SSS (SICK SINUS SYNDROME) (HCC): Primary | ICD-10-CM

## 2021-10-11 PROCEDURE — 93000 ELECTROCARDIOGRAM COMPLETE: CPT | Performed by: INTERNAL MEDICINE

## 2021-10-11 PROCEDURE — 99213 OFFICE O/P EST LOW 20 MIN: CPT | Performed by: INTERNAL MEDICINE

## 2021-12-06 ENCOUNTER — APPOINTMENT (OUTPATIENT)
Dept: LAB | Facility: HOSPITAL | Age: 76
End: 2021-12-06
Attending: UROLOGY
Payer: MEDICARE

## 2021-12-06 DIAGNOSIS — C61 PROSTATE CANCER (HCC): ICD-10-CM

## 2021-12-06 LAB — PSA SERPL-MCNC: 0.2 NG/ML (ref 0–4)

## 2021-12-06 PROCEDURE — 84153 ASSAY OF PSA TOTAL: CPT

## 2021-12-07 ENCOUNTER — OFFICE VISIT (OUTPATIENT)
Dept: FAMILY MEDICINE CLINIC | Facility: CLINIC | Age: 76
End: 2021-12-07
Payer: MEDICARE

## 2021-12-07 VITALS
BODY MASS INDEX: 21.89 KG/M2 | SYSTOLIC BLOOD PRESSURE: 142 MMHG | RESPIRATION RATE: 18 BRPM | TEMPERATURE: 97.4 F | WEIGHT: 144.4 LBS | HEIGHT: 68 IN | DIASTOLIC BLOOD PRESSURE: 70 MMHG | HEART RATE: 55 BPM | OXYGEN SATURATION: 100 %

## 2021-12-07 DIAGNOSIS — K40.90 NON-RECURRENT UNILATERAL INGUINAL HERNIA WITHOUT OBSTRUCTION OR GANGRENE: Primary | ICD-10-CM

## 2021-12-07 PROCEDURE — 99213 OFFICE O/P EST LOW 20 MIN: CPT | Performed by: FAMILY MEDICINE

## 2021-12-08 ENCOUNTER — IN-CLINIC DEVICE VISIT (OUTPATIENT)
Dept: CARDIOLOGY CLINIC | Facility: CLINIC | Age: 76
End: 2021-12-08
Payer: MEDICARE

## 2021-12-08 DIAGNOSIS — Z95.0 CARDIAC PACEMAKER IN SITU: Primary | ICD-10-CM

## 2021-12-08 PROCEDURE — 93280 PM DEVICE PROGR EVAL DUAL: CPT | Performed by: INTERNAL MEDICINE

## 2021-12-13 ENCOUNTER — RADIATION ONCOLOGY FOLLOW-UP (OUTPATIENT)
Dept: RADIATION ONCOLOGY | Facility: CLINIC | Age: 76
End: 2021-12-13
Attending: RADIOLOGY
Payer: MEDICARE

## 2021-12-13 VITALS
WEIGHT: 146.16 LBS | HEART RATE: 56 BPM | DIASTOLIC BLOOD PRESSURE: 60 MMHG | TEMPERATURE: 97.8 F | SYSTOLIC BLOOD PRESSURE: 124 MMHG | HEIGHT: 68 IN | RESPIRATION RATE: 16 BRPM | OXYGEN SATURATION: 99 % | BODY MASS INDEX: 22.15 KG/M2

## 2021-12-13 DIAGNOSIS — C61 PROSTATE CANCER (HCC): Primary | ICD-10-CM

## 2021-12-13 PROCEDURE — 99213 OFFICE O/P EST LOW 20 MIN: CPT | Performed by: RADIOLOGY

## 2021-12-13 PROCEDURE — 99211 OFF/OP EST MAY X REQ PHY/QHP: CPT | Performed by: RADIOLOGY

## 2021-12-22 ENCOUNTER — OFFICE VISIT (OUTPATIENT)
Dept: SURGERY | Facility: CLINIC | Age: 76
End: 2021-12-22
Payer: MEDICARE

## 2021-12-22 VITALS — HEIGHT: 68 IN | BODY MASS INDEX: 21.22 KG/M2 | WEIGHT: 140 LBS

## 2021-12-22 DIAGNOSIS — K40.90 NON-RECURRENT UNILATERAL INGUINAL HERNIA WITHOUT OBSTRUCTION OR GANGRENE: ICD-10-CM

## 2021-12-22 PROCEDURE — 99213 OFFICE O/P EST LOW 20 MIN: CPT | Performed by: SURGERY

## 2021-12-22 RX ORDER — TRIAMCINOLONE ACETONIDE 5 MG/G
CREAM TOPICAL
COMMUNITY
Start: 2021-12-17

## 2021-12-29 ENCOUNTER — OFFICE VISIT (OUTPATIENT)
Dept: FAMILY MEDICINE CLINIC | Facility: CLINIC | Age: 76
End: 2021-12-29
Payer: MEDICARE

## 2021-12-29 VITALS
HEART RATE: 61 BPM | OXYGEN SATURATION: 99 % | BODY MASS INDEX: 22.13 KG/M2 | RESPIRATION RATE: 16 BRPM | WEIGHT: 146 LBS | HEIGHT: 68 IN | SYSTOLIC BLOOD PRESSURE: 140 MMHG | DIASTOLIC BLOOD PRESSURE: 62 MMHG | TEMPERATURE: 97.6 F

## 2021-12-29 DIAGNOSIS — R10.32 LEFT INGUINAL PAIN: ICD-10-CM

## 2021-12-29 DIAGNOSIS — M53.3 SI (SACROILIAC) JOINT DYSFUNCTION: Primary | ICD-10-CM

## 2021-12-29 PROCEDURE — 99214 OFFICE O/P EST MOD 30 MIN: CPT | Performed by: FAMILY MEDICINE

## 2022-03-08 ENCOUNTER — OFFICE VISIT (OUTPATIENT)
Dept: UROLOGY | Facility: AMBULATORY SURGERY CENTER | Age: 77
End: 2022-03-08
Payer: MEDICARE

## 2022-03-08 VITALS
DIASTOLIC BLOOD PRESSURE: 76 MMHG | SYSTOLIC BLOOD PRESSURE: 112 MMHG | WEIGHT: 142 LBS | HEART RATE: 69 BPM | HEIGHT: 68 IN | BODY MASS INDEX: 21.52 KG/M2

## 2022-03-08 DIAGNOSIS — C61 PROSTATE CANCER (HCC): Primary | ICD-10-CM

## 2022-03-08 PROCEDURE — 99214 OFFICE O/P EST MOD 30 MIN: CPT | Performed by: NURSE PRACTITIONER

## 2022-03-08 NOTE — PATIENT INSTRUCTIONS
Vacuum assisted device for erectile dysfunction  [email or web address]  Call the office for concerns or questions  Increase water intake to 3-4 bottle of water per day  Repeat PSA in 1 year

## 2022-03-08 NOTE — PROGRESS NOTES
3/8/2022      Chief Complaint   Patient presents with    Follow-up    Erectile Dysfunction    Prostate Cancer     Assessment and Plan    67 y o  male managed by Dr Otis Kruse    1  Prostate Cancer  · Completed XRT 4/22/20  · PSA in 1 year  · Alternating of evaluation and with radiation oncology-annually  · We discussed his use of pelvic floor exercises/Kegel exercises  · If urinary symptoms persist consider pelvic floor therapy with physical therapy  · Follow up in the office in 1 year    2  Erectile Dysfunction  · Failed therapy with PDE5 inhibitors  · Discussed vacuum assisted device-patient education material provided    History of Present Illness  Isela Barton is a 67 y o  male here for follow up evaluation of  Hi 7 prostate cancer status post radiation therapy 04/22/2020  He presents to the office today with the complaint of urinary urgency and urinary frequency since radiation  He reports no significant worsening since his last office evaluation  He was previously given oxybutynin but reports stopping  At this point, he is mildly bothered by his urinary symptoms  Review of Systems   Constitutional: Negative for chills and fever  Respiratory: Negative for cough and shortness of breath  Cardiovascular: Negative for chest pain  Gastrointestinal: Negative for abdominal distention, abdominal pain, blood in stool, nausea and vomiting  Genitourinary: Positive for frequency and urgency  Negative for difficulty urinating, dysuria, enuresis, flank pain and hematuria  Skin: Negative for rash  Urinary Incontinence Screening      Most Recent Value   Urinary Incontinence    Urinary Incontinence? No   Incomplete emptying? No   Urinary frequency? No   Urinary urgency? No   Urinary hesitancy? No   Dysuria (painful difficult urination)? No   Nocturia (waking up to use the bathroom)? Yes  [3-4x]   Straining (having to push to go)?  No   Weak stream? No   Intermittent stream? No Past Medical History  Past Medical History:   Diagnosis Date    Cancer ([address] )     skin    Elevated PSA     Hyperlipidemia     Narcolepsy     Pacemaker 1988    Symptomatic Bradycardia    Prostate cancer [facility])        Past Social History  Past Surgical History:   Procedure Laterality Date    ATRIAL CARDIAC PACEMAKER INSERTION      CARDIAC CATHETERIZATION  02/12/2015    COLONOSCOPY      INSERTION OF FIDUCIAL MARKER (TRANSRECTAL ULTRASOUND GUIDANCE) N/A 2/3/2020    Procedure: INSERTION OF FIDUCIAL MARKER (TRANSRECTAL ULTRASOUND GUIDANCE) Miguel Gonzalez;  Surgeon: Janice Vizcarra MD;  [facility];  Service: Urology    RI REPAIR [address] HERNIA,5+Y/O,REDUCIBL Left 12/20/2019    Procedure: INGUINAL HERNIA REPAIR;  Surgeon: Samson Phillips DO;  [facility];  Service: General    SKIN CANCER EXCISION      left cheek     Social History     Tobacco Use   Smoking Status Never Smoker   Smokeless Tobacco Never Used       Past Family History  Family History   Problem Relation Age of Onset    Coronary artery disease Mother     Coronary artery disease Father     Skin cancer Father     Hyperlipidemia Father     Cancer Father         bladder cancer    Coronary artery disease Brother        Past Social history  Social History     Socioeconomic History    Marital status: /Civil Union     Spouse name: Not on file    Number of children: 3    Years of education: Not on file    Highest education level: Not on file   Occupational History    Not on file   Tobacco Use    Smoking status: Never Smoker    Smokeless tobacco: Never Used   Vaping Use    Vaping Use: Never used   Substance and Sexual Activity    Alcohol use: Yes     Alcohol/week: 7 0 standard drinks     Types: 7 Glasses of wine per week     Comment: 1 glass beer/wine daily    Drug use: No    Sexual activity: Not Currently     Comment: medication not effective since radiation  Has ED     Other Topics Concern    Not on file   Social History Narrative    Not on file     Social Determinants of Health     Financial Resource Strain: Not on file   Food Insecurity: Not on file   Transportation Needs: Not on file   Physical Activity: Not on file   Stress: Not on file   Social Connections: Not on file   Intimate Partner Violence: Not on file   Housing Stability: Not on file       Current Medications  Current Outpatient Medications   Medication Sig Dispense Refill    atorvastatin (LIPITOR) 20 mg tablet TAKE 1 TABLET BY MOUTH  DAILY IN THE EARLY MORNING 90 tablet 3    cholecalciferol (VITAMIN D3) 1,000 units tablet Take 1,000 Units by mouth daily      Cyanocobalamin (B-12) 1000 MCG CAPS Take by mouth daily      modafinil (PROVIGIL) 200 MG tablet Take 200 mg by mouth daily      fluticasone (FLONASE) 50 mcg/act nasal spray 1 spray into each nostril 2 (two) times a day (Patient not taking: Reported on 3/8/2022 ) 16 g 5    ibuprofen (MOTRIN) 200 mg tablet Take 200 mg by mouth every 6 (six) hours as needed for mild pain (Patient not taking: Reported on 1/3/2022 )      triamcinolone (KENALOG) 0 5 % cream  (Patient not taking: Reported on 1/3/2022 )       No current facility-administered medications for this visit  Allergies  No Known Allergies      The following portions of the patient's history were reviewed and updated as appropriate: allergies, current medications, past medical history, past social history, past surgical history and problem list       Vitals  Vitals:    03/08/22 1542   BP: 112/76   BP Location: Left arm   Patient Position: Sitting   Cuff Size: Adult   Pulse: 69   Weight: 64 4 kg (142 lb)   Height: 5' 8" (1 727 m)     Physical Exam  Physical Exam  Vitals reviewed  Constitutional:       General: He is not in acute distress  Appearance: Normal appearance  He is normal weight  HENT:      Head: Normocephalic  Pulmonary:      Effort: No respiratory distress  Breath sounds: Normal breath sounds     Skin:     General: Skin is warm and dry  Neurological:      General: No focal deficit present  Mental Status: He is alert and oriented to person, place, and time     Psychiatric:         Mood and Affect: Mood normal          Behavior: Behavior normal        Results  No results found for this or any previous visit (from the past 1 hour(s)) ]  Lab Results   Component Value Date    PSA 0 2 12/06/2021    PSA 0 3 05/24/2021    PSA 0 5 11/23/2020     Lab Results   Component Value Date    CALCIUM 8 2 (L) 09/14/2020    K 4 3 09/14/2020    CO2 29 09/14/2020     09/14/2020    BUN 13 09/14/2020    CREATININE 0 83 09/14/2020     Lab Results   Component Value Date    WBC 4 31 11/17/2020    HGB 13 2 11/17/2020    HCT 39 7 11/17/2020    MCV 98 11/17/2020     11/17/2020       Orders  Orders Placed This Encounter   Procedures    PSA Total, Diagnostic     Standing Status:   Future     Standing Expiration Date:   3/8/2023       CHRISTY Oneill

## 2022-03-09 ENCOUNTER — IN-CLINIC DEVICE VISIT (OUTPATIENT)
Dept: CARDIOLOGY CLINIC | Facility: CLINIC | Age: 77
End: 2022-03-09
Payer: MEDICARE

## 2022-03-09 DIAGNOSIS — Z95.0 PACEMAKER: Primary | ICD-10-CM

## 2022-03-09 PROCEDURE — 93288 INTERROG EVL PM/LDLS PM IP: CPT | Performed by: INTERNAL MEDICINE

## 2022-03-09 NOTE — PROGRESS NOTES
Results for orders placed or performed in visit on 03/09/22   Cardiac EP device report    Narrative    SJM-DUAL CHAMBER PPM  (DDD MODE) - NOT MRI CONDITIONAL  DEVICE INTERROGATED IN THE [place] OFFICE/NO TEST  BATTERY ADEQUATE (0 5-12 25 YRS)  AP 10%;  11% (SSS/DDD 50)  ALL AVAILABLE LEAD PARAMETERS WITHIN NORMAL LIMITS  NO EPISODES  NO PROGRAMMING CHANGES MADE TO DEVICE PARAMETERS  NORMAL DEVICE FUNCTION   PL

## 2022-03-29 ENCOUNTER — TELEPHONE (OUTPATIENT)
Dept: FAMILY MEDICINE CLINIC | Facility: CLINIC | Age: 77
End: 2022-03-29

## 2022-03-29 ENCOUNTER — TRANSCRIBE ORDERS (OUTPATIENT)
Dept: FAMILY MEDICINE CLINIC | Facility: CLINIC | Age: 77
End: 2022-03-29

## 2022-03-29 DIAGNOSIS — Z11.59 NEED FOR HEPATITIS C SCREENING TEST: Primary | ICD-10-CM

## 2022-03-29 NOTE — TELEPHONE ENCOUNTER
Patient came to the office to get Tdap and Hep C screening - received letter from our office stating that he needs those test  Explained to patient that it is better to get Tdap at the pharmacy for insurance coverage  Patient wants to know if he really needs to get the Hep C screening   Please advise

## 2022-06-13 ENCOUNTER — IN-CLINIC DEVICE VISIT (OUTPATIENT)
Dept: CARDIOLOGY CLINIC | Facility: CLINIC | Age: 77
End: 2022-06-13
Payer: MEDICARE

## 2022-06-13 DIAGNOSIS — Z95.0 CARDIAC PACEMAKER IN SITU: Primary | ICD-10-CM

## 2022-06-13 PROCEDURE — 93280 PM DEVICE PROGR EVAL DUAL: CPT | Performed by: INTERNAL MEDICINE

## 2022-06-13 NOTE — PROGRESS NOTES
Results for orders placed or performed in visit on 06/13/22   Cardiac EP device report    Narrative    SJM-DUAL CHAMBER PPM  (DDD MODE) - NOT MRI CONDITIONAL  DEVICE INTERROGATED IN THE [place] OFFICE: BATTERY VOLTAGE ADEQUATE 0 25-1 YR  AP 16%  17%  ALL AVAILABLE LEAD PARAMETERS WITHIN NORMAL LIMITS  MULTILPE DEVICE CLASSIFIED AMS NOTED; NO EGRAMS AVAIL; 1% BURDEN  NO PROGRAMMING CHANGES MADE TO DEVICE PARAMETERS  NORMAL DEVICE FUNCTION   NC

## 2022-07-02 DIAGNOSIS — E78.5 HYPERLIPIDEMIA: ICD-10-CM

## 2022-07-05 RX ORDER — ATORVASTATIN CALCIUM 20 MG/1
TABLET, FILM COATED ORAL
Qty: 90 TABLET | Refills: 3 | Status: SHIPPED | OUTPATIENT
Start: 2022-07-05

## 2022-07-25 ENCOUNTER — IN-CLINIC DEVICE VISIT (OUTPATIENT)
Dept: CARDIOLOGY CLINIC | Facility: CLINIC | Age: 77
End: 2022-07-25

## 2022-07-25 DIAGNOSIS — Z95.0 PRESENCE OF PERMANENT CARDIAC PACEMAKER: Primary | ICD-10-CM

## 2022-07-25 PROCEDURE — RECHECK: Performed by: INTERNAL MEDICINE

## 2022-07-25 NOTE — PROGRESS NOTES
Results for orders placed or performed in visit on 07/25/22   Cardiac EP device report    Narrative    SJM-DUAL CHAMBER PPM  (DDD MODE) - NOT MRI CONDITIONAL  DEVICE INTERROGATED IN THE [place] OFFICE  N/B--BATTERY CHECK--BATTERY VOLTAGE NEARING IMER  WILL SCHEDULE MONTHLY BATTERY CHECKS  ( 25-- 75 YRS)  NO SIGNIFICANT HIGH RATE EPISODES  NORMAL DEVICE FUNCTION  ---CHRISTIAN

## 2022-08-26 ENCOUNTER — OFFICE VISIT (OUTPATIENT)
Dept: FAMILY MEDICINE CLINIC | Facility: CLINIC | Age: 77
End: 2022-08-26
Payer: MEDICARE

## 2022-08-26 VITALS
BODY MASS INDEX: 21.2 KG/M2 | OXYGEN SATURATION: 97 % | WEIGHT: 139.4 LBS | RESPIRATION RATE: 18 BRPM | SYSTOLIC BLOOD PRESSURE: 110 MMHG | DIASTOLIC BLOOD PRESSURE: 60 MMHG | HEART RATE: 62 BPM | TEMPERATURE: 98.2 F

## 2022-08-26 DIAGNOSIS — K43.9 EPIGASTRIC HERNIA: Primary | ICD-10-CM

## 2022-08-26 DIAGNOSIS — E78.2 MIXED HYPERLIPIDEMIA: ICD-10-CM

## 2022-08-26 DIAGNOSIS — D72.819 LEUKOPENIA, UNSPECIFIED TYPE: ICD-10-CM

## 2022-08-26 PROCEDURE — 99213 OFFICE O/P EST LOW 20 MIN: CPT | Performed by: FAMILY MEDICINE

## 2022-08-26 NOTE — ASSESSMENT & PLAN NOTE
New  · Ongoing for the past several months and getting larger  · Patient reports a palpable mass 1 in above umbilicus worse when lying flat and is reducible  · On physical exam I do not palpate a hernia nor am I able to reproduce it with Valsalva    · Patient has a history of inguinal hernias previously treated with surgery  · He would like further evaluation by General surgery  · Will obtain CT abdomen to evaluate extent of hernia prior to referral  · Discussed ED precaution  · Follow-up as needed

## 2022-08-26 NOTE — PROGRESS NOTES
Assessment/Plan:      1  Epigastric hernia  Assessment & Plan:  New  Ongoing for the past several months and getting larger  Patient reports a palpable mass 1 in above umbilicus worse when lying flat and is reducible  On physical exam I do not palpate a hernia nor am I able to reproduce it with Valsalva  Patient has a history of inguinal hernias previously treated with surgery  He would like further evaluation by General surgery  Will obtain CT abdomen to evaluate extent of hernia prior to referral  Discussed ED precaution  Follow-up as needed    Orders:  -     Ambulatory Referral to General Surgery; Future  -     CT abdomen pelvis w wo contrast; Future; Expected date: 08/26/2022    2  Mixed hyperlipidemia  -     Lipid panel; Future  -     Comprehensive metabolic panel; Future    3  Leukopenia, unspecified type  -     CBC and differential; Future            Subjective:      Patient ID: Alyssia Thomas is a 65 y o  male presents today of evaluation of possible hernia  No pain or discomfort  Occurs moslty when laying  He is able to reduce it  He would like further evaluation of this  Denies any nausea, vomiting, or diarrhea  Moving bowels normally  HPI    The following portions of the patient's history were reviewed and updated as appropriate: allergies, current medications, past family history, past medical history, past social history, past surgical history and problem list     Review of Systems   Constitutional: Negative for activity change, chills, fatigue and fever  HENT: Negative for congestion, hearing loss, rhinorrhea, sinus pressure, sinus pain, sneezing and sore throat  Eyes: Negative for visual disturbance  Respiratory: Negative for cough, chest tightness, shortness of breath and wheezing  Cardiovascular: Negative for chest pain, palpitations and leg swelling  Gastrointestinal: Negative for abdominal pain, blood in stool, constipation, diarrhea, nausea and vomiting          Abdominal lump   Genitourinary: Negative for difficulty urinating, dysuria, flank pain, frequency, hematuria and urgency  Musculoskeletal: Negative for back pain, myalgias and neck pain  Neurological: Negative for dizziness, syncope, light-headedness, numbness and headaches  Objective:      /60 (BP Location: Left arm, Patient Position: Sitting, Cuff Size: Standard)   Pulse 62   Temp 98 2 °F (36 8 °C) (Temporal)   Resp 18   Wt 63 2 kg (139 lb 6 4 oz)   SpO2 97%   BMI 21 20 kg/m²          Physical Exam  Vitals reviewed  Constitutional:       General: He is not in acute distress  Appearance: He is well-developed and normal weight  He is not ill-appearing, toxic-appearing or diaphoretic  HENT:      Head: Normocephalic and atraumatic  Right Ear: External ear normal       Left Ear: External ear normal       Nose: Nose normal  No congestion  Mouth/Throat:      Mouth: Mucous membranes are moist       Pharynx: Oropharynx is clear  No oropharyngeal exudate  Eyes:      General: No scleral icterus  Right eye: No discharge  Left eye: No discharge  Pupils: Pupils are equal, round, and reactive to light  Neck:      Thyroid: No thyromegaly  Vascular: No JVD  Trachea: No tracheal deviation  Cardiovascular:      Rate and Rhythm: Normal rate and regular rhythm  Pulses: Normal pulses  Heart sounds: Normal heart sounds  No murmur heard  No friction rub  Pulmonary:      Effort: Pulmonary effort is normal  No respiratory distress  Breath sounds: Normal breath sounds  No stridor  No wheezing, rhonchi or rales  Chest:      Chest wall: No tenderness  Abdominal:      General: Bowel sounds are normal  There is no distension  Palpations: Abdomen is soft  Tenderness: There is no abdominal tenderness  There is no right CVA tenderness, left CVA tenderness, guarding or rebound  Musculoskeletal:         General: No tenderness   Normal range of motion  Cervical back: Normal range of motion and neck supple  No tenderness  Right lower leg: No edema  Left lower leg: No edema  Lymphadenopathy:      Cervical: No cervical adenopathy  Skin:     General: Skin is warm and dry  Coloration: Skin is not pale  Findings: No erythema  Neurological:      Mental Status: He is alert and oriented to person, place, and time  Mental status is at baseline  Deep Tendon Reflexes: Reflexes are normal and symmetric  Psychiatric:         Mood and Affect: Mood normal          Behavior: Behavior normal          Thought Content:  Thought content normal          Judgment: Judgment normal

## 2022-08-27 ENCOUNTER — APPOINTMENT (OUTPATIENT)
Dept: LAB | Facility: HOSPITAL | Age: 77
End: 2022-08-27
Payer: MEDICARE

## 2022-08-27 DIAGNOSIS — D72.819 LEUKOPENIA, UNSPECIFIED TYPE: ICD-10-CM

## 2022-08-27 DIAGNOSIS — E78.2 MIXED HYPERLIPIDEMIA: ICD-10-CM

## 2022-08-27 LAB
ALBUMIN SERPL BCP-MCNC: 3.4 G/DL (ref 3.5–5)
ALP SERPL-CCNC: 59 U/L (ref 46–116)
ALT SERPL W P-5'-P-CCNC: 24 U/L (ref 12–78)
ANION GAP SERPL CALCULATED.3IONS-SCNC: 9 MMOL/L (ref 4–13)
AST SERPL W P-5'-P-CCNC: 24 U/L (ref 5–45)
BASOPHILS # BLD AUTO: 0.05 THOUSANDS/ΜL (ref 0–0.1)
BASOPHILS NFR BLD AUTO: 1 % (ref 0–1)
BILIRUB SERPL-MCNC: 0.3 MG/DL (ref 0.2–1)
BUN SERPL-MCNC: 18 MG/DL (ref 5–25)
CALCIUM ALBUM COR SERPL-MCNC: 8.8 MG/DL (ref 8.3–10.1)
CALCIUM SERPL-MCNC: 8.3 MG/DL (ref 8.3–10.1)
CHLORIDE SERPL-SCNC: 105 MMOL/L (ref 96–108)
CHOLEST SERPL-MCNC: 155 MG/DL
CO2 SERPL-SCNC: 26 MMOL/L (ref 21–32)
CREAT SERPL-MCNC: 0.94 MG/DL (ref 0.6–1.3)
EOSINOPHIL # BLD AUTO: 0.14 THOUSAND/ΜL (ref 0–0.61)
EOSINOPHIL NFR BLD AUTO: 3 % (ref 0–6)
ERYTHROCYTE [DISTWIDTH] IN BLOOD BY AUTOMATED COUNT: 12.6 % (ref 11.6–15.1)
GFR SERPL CREATININE-BSD FRML MDRD: 77 ML/MIN/1.73SQ M
GLUCOSE P FAST SERPL-MCNC: 101 MG/DL (ref 65–99)
HCT VFR BLD AUTO: 41.4 % (ref 36.5–49.3)
HDLC SERPL-MCNC: 59 MG/DL
HGB BLD-MCNC: 13.8 G/DL (ref 12–17)
IMM GRANULOCYTES # BLD AUTO: 0.01 THOUSAND/UL (ref 0–0.2)
IMM GRANULOCYTES NFR BLD AUTO: 0 % (ref 0–2)
LDLC SERPL CALC-MCNC: 88 MG/DL (ref 0–100)
LYMPHOCYTES # BLD AUTO: 1.35 THOUSANDS/ΜL (ref 0.6–4.47)
LYMPHOCYTES NFR BLD AUTO: 32 % (ref 14–44)
MCH RBC QN AUTO: 31.8 PG (ref 26.8–34.3)
MCHC RBC AUTO-ENTMCNC: 33.3 G/DL (ref 31.4–37.4)
MCV RBC AUTO: 95 FL (ref 82–98)
MONOCYTES # BLD AUTO: 0.47 THOUSAND/ΜL (ref 0.17–1.22)
MONOCYTES NFR BLD AUTO: 11 % (ref 4–12)
NEUTROPHILS # BLD AUTO: 2.21 THOUSANDS/ΜL (ref 1.85–7.62)
NEUTS SEG NFR BLD AUTO: 53 % (ref 43–75)
NONHDLC SERPL-MCNC: 96 MG/DL
NRBC BLD AUTO-RTO: 0 /100 WBCS
PLATELET # BLD AUTO: 169 THOUSANDS/UL (ref 149–390)
PMV BLD AUTO: 9.9 FL (ref 8.9–12.7)
POTASSIUM SERPL-SCNC: 4.5 MMOL/L (ref 3.5–5.3)
PROT SERPL-MCNC: 6.6 G/DL (ref 6.4–8.4)
RBC # BLD AUTO: 4.34 MILLION/UL (ref 3.88–5.62)
SODIUM SERPL-SCNC: 140 MMOL/L (ref 135–147)
TRIGL SERPL-MCNC: 41 MG/DL
WBC # BLD AUTO: 4.23 THOUSAND/UL (ref 4.31–10.16)

## 2022-08-27 PROCEDURE — 36415 COLL VENOUS BLD VENIPUNCTURE: CPT

## 2022-08-27 PROCEDURE — 80053 COMPREHEN METABOLIC PANEL: CPT

## 2022-08-27 PROCEDURE — 85025 COMPLETE CBC W/AUTO DIFF WBC: CPT

## 2022-08-27 PROCEDURE — 80061 LIPID PANEL: CPT

## 2022-09-06 ENCOUNTER — IN-CLINIC DEVICE VISIT (OUTPATIENT)
Dept: CARDIOLOGY CLINIC | Facility: CLINIC | Age: 77
End: 2022-09-06

## 2022-09-06 ENCOUNTER — HOSPITAL ENCOUNTER (OUTPATIENT)
Dept: CT IMAGING | Facility: HOSPITAL | Age: 77
Discharge: HOME | End: 2022-09-06
Attending: FAMILY MEDICINE
Payer: MEDICARE

## 2022-09-06 DIAGNOSIS — K43.9 EPIGASTRIC HERNIA: ICD-10-CM

## 2022-09-06 DIAGNOSIS — Z95.0 PRESENCE OF PERMANENT CARDIAC PACEMAKER: Primary | ICD-10-CM

## 2022-09-06 PROCEDURE — 74177 CT ABD & PELVIS W/CONTRAST: CPT

## 2022-09-06 PROCEDURE — RECHECK: Performed by: INTERNAL MEDICINE

## 2022-09-06 RX ADMIN — IOHEXOL 65 ML: 350 INJECTION, SOLUTION INTRAVENOUS at 11:17

## 2022-09-06 NOTE — PROGRESS NOTES
Results for orders placed or performed in visit on 09/06/22   Cardiac EP device report    Narrative    SJM-DUAL CHAMBER PPM  (DDD MODE) - NOT MRI CONDITIONAL  DEVICE INTERROGATED IN THE [place] OFFICE  N/B--BATTERY CHECK--BATTERY VOLTAGE NEARING IMER  WILL SCHEDULE MONTHLY BATTERY CHECKS  ( 25-- 75 YRS)  NO SIGNIFICANT HIGH RATE EPISODES  NORMAL DEVICE FUNCTION  ---CHRISTIAN

## 2022-09-21 ENCOUNTER — OFFICE VISIT (OUTPATIENT)
Dept: FAMILY MEDICINE CLINIC | Facility: CLINIC | Age: 77
End: 2022-09-21
Payer: MEDICARE

## 2022-09-21 VITALS
WEIGHT: 143.2 LBS | DIASTOLIC BLOOD PRESSURE: 66 MMHG | SYSTOLIC BLOOD PRESSURE: 120 MMHG | RESPIRATION RATE: 16 BRPM | TEMPERATURE: 97.3 F | OXYGEN SATURATION: 99 % | HEART RATE: 53 BPM | BODY MASS INDEX: 21.7 KG/M2 | HEIGHT: 68 IN

## 2022-09-21 DIAGNOSIS — D73.89 SPLENIC LESION: Primary | ICD-10-CM

## 2022-09-21 DIAGNOSIS — Z23 ENCOUNTER FOR VACCINATION: ICD-10-CM

## 2022-09-21 PROCEDURE — G0008 ADMIN INFLUENZA VIRUS VAC: HCPCS | Performed by: FAMILY MEDICINE

## 2022-09-21 PROCEDURE — 99213 OFFICE O/P EST LOW 20 MIN: CPT | Performed by: FAMILY MEDICINE

## 2022-09-21 PROCEDURE — 90662 IIV NO PRSV INCREASED AG IM: CPT | Performed by: FAMILY MEDICINE

## 2022-09-21 NOTE — PROGRESS NOTES
Assessment/Plan:      1  Splenic lesion  Assessment & Plan:  Incidentally found on CT AP 9/6/2022  Multiple lesions up to 9mm in size  No history of malignancy  8/27/2022: CBC - mild leukopenia 4 23 but otherwise normal and normal differential  Denies night sweats, weight loss, or unexplained fevers  Unable to complete MRI due to incompatible pacemaker  Continue to monitor and discuss with pcp and next OV      2  Encounter for vaccination  -     influenza vaccine, high-dose, PF 0 7 mL (FLUZONE HIGH-DOSE)            Subjective:      Patient ID: Flaquita Horton is a 65 y o  male presents today to discuss recent CT scan  He would also like to discuss options for flu vaccine  CT scan showed incidental splenic lesions  Patient is unable to complete an MRI for follow-up due to incompatible pacemaker  CT AP also showed atherosclerosis of his aorta  Discussed extensively with patient that unless he has history of CVA or MI there is no indication for treat meant at this time  I recommended continued modification of ASCVD risk factors and continuation of Lipitor 20 mg  HPI    The following portions of the patient's history were reviewed and updated as appropriate: allergies, current medications, past family history, past medical history, past social history, past surgical history and problem list     Review of Systems   Constitutional: Negative for activity change, chills, diaphoresis and fever  HENT: Negative for ear pain, hearing loss, postnasal drip, rhinorrhea, sinus pressure, sinus pain, sneezing and sore throat  Respiratory: Negative for cough, chest tightness, shortness of breath and wheezing  Cardiovascular: Negative for chest pain, palpitations and leg swelling  Gastrointestinal: Negative for abdominal pain, blood in stool, constipation, diarrhea, nausea and vomiting  Genitourinary: Negative for dysuria, frequency, hematuria and urgency     Musculoskeletal: Negative for arthralgias and myalgias  Neurological: Negative for dizziness, syncope, weakness, light-headedness, numbness and headaches  Objective:      /66 (BP Location: Left arm, Patient Position: Sitting, Cuff Size: Adult)   Pulse (!) 53   Temp (!) 97 3 °F (36 3 °C)   Resp 16   Ht 5' 8" (1 727 m)   Wt 65 kg (143 lb 3 2 oz)   SpO2 99%   BMI 21 77 kg/m²          Physical Exam  Vitals reviewed  Constitutional:       General: He is not in acute distress  Appearance: He is well-developed  He is not diaphoretic  HENT:      Head: Normocephalic and atraumatic  Right Ear: External ear normal       Left Ear: External ear normal       Nose: Nose normal  No congestion  Mouth/Throat:      Mouth: Mucous membranes are moist       Pharynx: Oropharynx is clear  No oropharyngeal exudate  Eyes:      General: No scleral icterus  Pupils: Pupils are equal, round, and reactive to light  Neck:      Thyroid: No thyromegaly  Vascular: No JVD  Trachea: No tracheal deviation  Cardiovascular:      Rate and Rhythm: Normal rate and regular rhythm  Pulses: Normal pulses  Heart sounds: Normal heart sounds  No murmur heard  No friction rub  Pulmonary:      Effort: Pulmonary effort is normal  No respiratory distress  Breath sounds: Normal breath sounds  No wheezing or rales  Chest:      Chest wall: No tenderness  Abdominal:      General: Bowel sounds are normal  There is no distension  Palpations: Abdomen is soft  Tenderness: There is no abdominal tenderness  There is no right CVA tenderness, left CVA tenderness, guarding or rebound  Musculoskeletal:         General: No tenderness  Normal range of motion  Cervical back: Normal range of motion and neck supple  No tenderness  Right lower leg: No edema  Left lower leg: No edema  Lymphadenopathy:      Cervical: No cervical adenopathy  Skin:     General: Skin is warm and dry     Neurological:      Mental Status: He is alert and oriented to person, place, and time  Mental status is at baseline  Deep Tendon Reflexes: Reflexes are normal and symmetric  Psychiatric:         Mood and Affect: Mood normal          Behavior: Behavior normal          Thought Content:  Thought content normal          Judgment: Judgment normal

## 2022-09-21 NOTE — ASSESSMENT & PLAN NOTE
· Incidentally found on CT AP 9/6/2022  · Multiple lesions up to 9mm in size  · No history of malignancy  · 8/27/2022: CBC - mild leukopenia 4 23 but otherwise normal and normal differential  · Denies night sweats, weight loss, or unexplained fevers  · Unable to complete MRI due to incompatible pacemaker  · Continue to monitor and discuss with pcp and next OV

## 2022-09-22 ENCOUNTER — TREATMENT (OUTPATIENT)
Dept: FAMILY MEDICINE CLINIC | Facility: CLINIC | Age: 77
End: 2022-09-22

## 2022-09-22 PROBLEM — R93.5 ABNORMAL CT OF THE ABDOMEN: Chronic | Status: ACTIVE | Noted: 2022-09-22

## 2022-09-22 NOTE — PROGRESS NOTES
Assessment/Plan:   Garry Maldonado is a 65 y o male who is here for  Consider epigastric hernia  CT scan shows a mild diastasis of the rectus abdominal muscles without hernia  Incidentally found multiple splenic lesions all subcentimeter  Six-month follow-up MRI or in his case, CT scan with oral contrast, indicated  Patient is not able to have an MRI secondary to a pacemaker  On exam nonpalpable epigastric hernia but the history is quite consistent  Plan:   1  Probable epigastric hernia based on clinical history  Possibly visualized on CT scan to my view  Primary repair open when he is ready to have this done or when becomes more symptomatic  2  Splenic lesions, multiple, less than 1 cm but up to 9 mm  He had does have a history of prostate cancer and melanoma so we will follow radiology's recommendations with a contrast enhanced CT scan, since he cannot have an MRI, in 6 months and at 12 months  Imaging:    Patient understands hernia occurrence or re-occurrence risk is higher in a diabetic, tobacco user, with elevated BMIs    _____________________________________________      HPI:  Garry Maldonado is a 65 y o male who was referred for evaluation of possible epigastric hernia  Currently complaining of  persistent Ventral Hernia  worse with bending, coughing,     A tiny lump comes out of the mid epigastric region, especially at night  It seems to self reduce  It is very palpable and he is a very thin gentleman  no nausea and no vomiting,     regular bowel movement  Duration of pain or symptoms:  over 3 months and no pain       Prior abdominal surgery:   None in the epigastrium  He has had an inguinal hernia  BMI: Body mass index is 21 71 kg/m²       Smoking Status: Non-smoker     Lab Results   Component Value Date    WBC 4 23 (L) 08/27/2022    HGB 13 8 08/27/2022    HCT 41 4 08/27/2022    MCV 95 08/27/2022     08/27/2022     Lab Results   Component Value Date    K 4 5 08/27/2022     08/27/2022    CO2 26 08/27/2022    BUN 18 08/27/2022    CREATININE 0 94 08/27/2022    GLUF 101 (H) 08/27/2022    CALCIUM 8 3 08/27/2022    CORRECTEDCA 8 8 08/27/2022    AST 24 08/27/2022    ALT 24 08/27/2022    ALKPHOS 59 08/27/2022    EGFR 77 08/27/2022     No results found for: INR, PROTIME        ROS:  General ROS: negative  negative for - chills, fatigue, fever or night sweats, weight loss  Respiratory ROS: no cough, shortness of breath, or wheezing  Cardiovascular ROS: no chest pain or dyspnea on exertion  Genito-Urinary ROS: no dysuria, trouble voiding, or hematuria  Musculoskeletal ROS: negative for - gait disturbance, joint pain or muscle pain  Neurological ROS: no TIA or stroke symptoms  Abdominal ROS: see HPI  GI ROS: see HPI  Skin ROS: no new rashes or lesions   Lymphatic ROS: no new adenopathy noted by pt  GYN ROS: see HPI, no new GYN history or bleeding noted  Psy ROS: no new mental or behavioral disturbances       Patient Active Problem List   Diagnosis    Elevated PSA    Pro[facility] cancer [facility] [address] [facility]    Non-recurrent unilateral inguinal hernia without obstruction or gangrene    Leukopenia    Sick sinus syndrome ([facility] [address] [facility]    Mixed hyperlipidemia    SSS (sick sinus syndrome) ([facility] [address] [facility]    Pacemaker    Lymphadenopathy    Left inguinal pain    SI (sacroiliac) joint dysfunction    Epigastric hernia    Splenic lesion    Abnormal CT of the abdomen         Allergies: Patient has no known allergies      Meds:  Current Outpatient Medications:     atorvastatin (LIPITOR) 20 mg tablet, TAKE 1 TABLET BY MOUTH  DAILY IN THE EARLY MORNING, Disp: 90 tablet, Rfl: 3    cholecalciferol (VITAMIN D3) 1,000 units tablet, Take 1,000 Units by mouth daily, Disp: , Rfl:     Cyanocobalamin (B-12) 1000 MCG CAPS, Take by mouth daily, Disp: , Rfl:     modafinil (PROVIGIL) 200 MG tablet, Take 200 mg by mouth daily, Disp: , Rfl:     fluticasone (FLONASE) 50 mcg/act nasal spray, 1 spray into each nostril 2 (two) times a day (Patient not taking: No sig reported), Disp: 16 g, Rfl: 5    ibuprofen (MOTRIN) 200 mg tablet, Take 200 mg by mouth every 6 (six) hours as needed for mild pain (Patient not taking: No sig reported), Disp: , Rfl:     triamcinolone (KENALOG) 0 5 % cream, , Disp: , Rfl:     PMH:  Past Medical History:   Diagnosis Date    Cancer ([facility] [address] )     skin    Elevated PSA     Hyperlipidemia     Narcolepsy     Pacemaker 1988    Symptomatic Bradycardia    Prostate cancer (HCC)        PSH:  Past Surgical History:   Procedure Laterality Date    ATRIAL CARDIAC PACEMAKER INSERTION      CARDIAC CATHETERIZATION  02/12/2015    COLONOSCOPY      INSERTION OF FIDUCIAL MARKER (TRANSRECTAL ULTRASOUND GUIDANCE) N/A 2/3/2020    Procedure: INSERTION OF FIDUCIAL MARKER (TRANSRECTAL ULTRASOUND GUIDANCE) SPACEOAR;  Surgeon: Eliu Fernandez MD;  [facility];  Service: Urology    NE REPAIR [address] HERNIA,5+Y/O,REDUCIBL Left 12/20/2019    Procedure: INGUINAL HERNIA REPAIR;  Surgeon: Carl Bernardo DO;  Location[facility];  Service: General    SKIN CANCER EXCISION      left cheek       Family History   Problem Relation Age of Onset    Coronary artery disease Mother     Coronary artery disease Father     Skin cancer Father     Hyperlipidemia Father     Cancer Father         bladder cancer    Coronary artery disease Brother         reports that he has never smoked  He has never used smokeless tobacco  He reports current alcohol use of about 8 0 standard drinks of alcohol per week  He reports that he does not use drugs      Vitals:    09/26/22 0919   BP: 120/70   Pulse: 56   Temp: (!) 97 °F (36 1 °C)       PHYSICAL EXAM  General Appearance:    Alert, cooperative, no distress,    Head:    Normocephalic without obvious abnormality   Eyes:    PERRL, conjunctiva/corneas clear,      Neck:   Supple, no adenopathy, no JVD   Back:     Symmetric, no spinal or CVA tenderness   Lungs:     Clear to auscultation bilaterally, no wheezing or rhonchi   Heart:    Regular rate and rhythm, S1 and S2 normal, no murmur   Abdomen:      abdomen is soft without significant tenderness, masses, organomegaly or guarding Bowel sounds are normal   Not really palpable today on the epigastrium but he does have a mild to modest diastasis recti  However his history is very consistent with an epigastric hernia  Extremities:   Extremities normal  No clubbing, cyanosis or edema   Psych:   Normal Affect, AOx3  Neurologic:  Skin:   CNII-XII intact  Strength symmetric, speech intact    Warm, dry, intact, no visible rashes or lesions                   Informed consent for procedure was personally discussed, reviewed, and signed by Dr Humphrey Corporal  Discussion by Dr Humphrey Corporal was carried out regarding risks, benefits, and alternatives with the patient  Risks include but are not limited to:  bleeding, infection, and delayed wound healing or an open wound, pulmonary embolus, leaks from bowel or bile ducts or other viscus, transfusions, death  Discussed in further detail the more common complications and their rates of occurrence   was used if necessary  Patient expressed understanding of the issues discussed and wished/consented to proceed  All questions were answered by Dr Humphrey Corporal  Discussion performed between patient and the provider signing below  Signature:   Russ Bull MD    Date: 9/26/2022 Time: 10:06 AM       Some portions of this record may have been generated with voice recognition software  There may be translation, syntax,  or grammatical errors  Occasional wrong word or "sound-a-like" substitutions may have occurred due to the inherent limitations of the voice recognition software  Read the chart carefully and recognize, using context, where substitutions may have occurred  If you have any questions, please contact the dictating provider for clarification or correction, as needed   This encounter has been coded by lam non-certified coder

## 2022-09-26 ENCOUNTER — OFFICE VISIT (OUTPATIENT)
Dept: SURGERY | Facility: CLINIC | Age: 77
End: 2022-09-26
Payer: MEDICARE

## 2022-09-26 VITALS
HEIGHT: 68 IN | BODY MASS INDEX: 21.64 KG/M2 | HEART RATE: 56 BPM | SYSTOLIC BLOOD PRESSURE: 120 MMHG | DIASTOLIC BLOOD PRESSURE: 70 MMHG | WEIGHT: 142.8 LBS | TEMPERATURE: 97 F

## 2022-09-26 DIAGNOSIS — K43.9 EPIGASTRIC HERNIA: ICD-10-CM

## 2022-09-26 PROCEDURE — 99213 OFFICE O/P EST LOW 20 MIN: CPT | Performed by: SURGERY

## 2022-10-12 ENCOUNTER — IN-CLINIC DEVICE VISIT (OUTPATIENT)
Dept: CARDIOLOGY CLINIC | Facility: CLINIC | Age: 77
End: 2022-10-12
Payer: MEDICARE

## 2022-10-12 DIAGNOSIS — Z95.0 PRESENCE OF PERMANENT CARDIAC PACEMAKER: Primary | ICD-10-CM

## 2022-10-12 PROCEDURE — 93280 PM DEVICE PROGR EVAL DUAL: CPT | Performed by: INTERNAL MEDICINE

## 2022-10-12 NOTE — PROGRESS NOTES
Results for orders placed or performed in visit on 10/12/22   Cardiac EP device report    Narrative    SJM-DUAL CHAMBER PPM  (DDD MODE) - NOT MRI CONDITIONAL  DEVICE INTERROGATED IN THE [place] OFFICE  BATTERY VOLTAGE NEARING IMER (0 25-0 50 YR / 2 68V-RRT 2 5V)  WILL CONTINUE MONTHLY BATTERY CHECKS  AP 15%  16% (DDD 50); ALL LEAD PARAMETERS WITHIN NORMAL LIMITS/STABLE; 4,090 AMS EVENTS NOTED (NO EGM'S STORED WITH HX OF SAME)  AT/AF BURDEN 1 1%  NO PROGRAMMING CHANGES MADE TO DEVICE PARAMETERS  PATIENT SCHEDULED TO SEE DR WILSON ON 10/18/22 FOR CARDIAC F/U  NO PROGRAMMING CHANGES MADE TO DEVICE PARAMETERS  NORMAL DEVICE FUNCTION NEARING RRT      ES

## 2022-10-14 NOTE — PROGRESS NOTES
EPS Progress Note - Jamel Ortiz 65 y o  male MRN: [id number]           ASSESSMENT:  1  Presence of permanent cardiac pacemaker  IR upper extremity venogram- Diagnostic   2  Sick sinus syndrome (HCC)  POCT ECG    IR upper extremity venogram- Diagnostic   3  Epigastric hernia     4  Abnormal CT of the abdomen     5  Mixed hyperlipidemia     6  Pacemaker          Device Interrogation  10/12/2022  SJM-DUAL CHAMBER PPM  (DDD MODE) - NOT MRI CONDITIONAL   DEVICE INTERROGATED IN THE [place] OFFICE  BATTERY VOLTAGE NEARING IMER (0 25-0 50 YR / 2 68V-RRT 2 5V)  WILL CONTINUE MONTHLY BATTERY CHECKS  AP 15%  16% (DDD 50); ALL LEAD PARAMETERS WITHIN NORMAL LIMITS/STABLE; 4,090 AMS EVENTS NOTED (NO EGM'S STORED WITH HX OF SAME)  AT/AF BURDEN 1 1%  NO PROGRAMMING CHANGES MADE TO DEVICE PARAMETERS  PATIENT SCHEDULED TO SEE DR WILSON ON 10/18/22 FOR CARDIAC F/U  NO PROGRAMMING CHANGES MADE TO DEVICE PARAMETERS  NORMAL DEVICE FUNCTION NEARING RRT     ES           PLAN:  1  Pacemaker approaching IMER will schedule right-sided venogram to assess venous patency in case he requires an extra leads were new lead at the time of generator placement  2 of his leads are from [place] and or unipolar one of which does clearly fractured I reviewed his x-ray with him today his atrial lead is bipolar  2  Hyperlipidemia well controlled on statin therapy     3  History of syncope secondary to sick sinus syndrome relieved with pacemaker placement     4  Aortic atherosclerosis I explained to him that he is on a statin with an LDL of 88 and I would not recommend any further therapy at this point time he can walk up to 8 miles at a time and has no claudication  He did take a baby aspirin in the past but this caused GI symptoms    5  Atrial lead fracture his right atrial lead is clearly fractured but he has a new bipolar lead in place  6  Atrial high rate episode not stored on his current device    With new device will better ascertain the nature of these      As stated in my previous note dated 10/11/2021,   sick sinus syndrome his pacemaker is working appropriately  I reviewed his chest x-ray in detail today as well as his pacemaker card and it appears that his right atrial lead is bipolar and right ventricular lead is unipolar  Both appear to be working appropriately  Dating back to 2018 he has had atrial high rate episodes of up to 1% which may be artifact  This particular pacemaker does not stored electrogram so I cannot see with they are  We did consider performing 1-2 week monitor however since it has been going on for years we agreed to not pursue any monitoring at this time  Once he has a generator change we will be able to see what they are  Interestingly every time his pacemaker is turned on he has phrenic nerve stimulation which causes him to jump  Hyperlipidemia is very well controlled on statin therapy    He remains remarkably healthy    HPI:     Pt presents in the office today for F/U  Pt went to [facility] in June, walked about 8 miles daily  He goes with a [employer] group, [facility], in [facility]  2 daughters doing well, son is well also  Pt had a CT [id number] to review results  Pt is on a statin dose that is tolerated, no baby ASA  No changes recommended  Pt did see Dr Sandor Murphy, who suggests pt just monitor this at this time  Labs   Pt has adopted a new method of breathing through his nose, feels this has increased his endurance  Pt follows a book by the name of "Breath" by Colletta Harries  Interim history  Follow-up for above        ROS:  He offers absolutely no complaints he feels well exercises regularly does quite a bit of hiking all other 12 point ROS negative       Objective:     Vitals:   ? Blood pressure 126/62, pulse (!) 54, height 5' 8" (1 727 m), weight 64 7 kg (142 lb 9 6 oz)             Physical Exam:    GEN: Serena Sanchez appears well, alert and oriented x 3, pleasant and cooperative   HEENT: pupils equal, round, and reactive to light; extraocular muscles intact  NECK: supple, no carotid bruits   HEART: regular rhythm, normal S1 and S2, no murmurs, clicks, gallops or rubs   LUNGS: clear to auscultation bilaterally; no wheezes, rales, or rhonchi   ABDOMEN: normal bowel sounds, soft, no tenderness, no distention  EXTREMITIES: peripheral pulses normal; no clubbing, cyanosis, or edema  NEURO: no focal findings   SKIN: normal without suspicious lesions on exposed skin    Medications:      Current Outpatient Medications:   •  atorvastatin (LIPITOR) 20 mg tablet, TAKE 1 TABLET BY MOUTH  DAILY IN THE EARLY MORNING, Disp: 90 tablet, Rfl: 3  •  cholecalciferol (VITAMIN D3) 1,000 units tablet, Take 1,000 Units by mouth daily, Disp: , Rfl:   •  Cyanocobalamin (B-12) 1000 MCG CAPS, Take by mouth daily, Disp: , Rfl:   •  ibuprofen (MOTRIN) 200 mg tablet, Take 200 mg by mouth every 6 (six) hours as needed for mild pain, Disp: , Rfl:   •  modafinil (PROVIGIL) 200 MG tablet, Take 200 mg by mouth daily, Disp: , Rfl:   •  fluticasone (FLONASE) 50 mcg/act nasal spray, 1 spray into each nostril 2 (two) times a day (Patient not taking: No sig reported), Disp: 16 g, Rfl: 5  •  triamcinolone (KENALOG) 0 5 % cream, , Disp: , Rfl:      Family History   Problem Relation Age of Onset   • Coronary artery disease Mother    • Coronary artery disease Father    • Skin cancer Father    • Hyperlipidemia Father    • Cancer Father         bladder cancer   • Coronary artery disease Brother      Social History     Socioeconomic History   • Marital status: /Civil Union     Spouse name: Not on file   • Number of children: 3   • Years of education: Not on file   • Highest education level: Not on file   Occupational History   • Not on file   Tobacco Use   • Smoking status: Never Smoker   • Smokeless tobacco: Never Used   Vaping Use   • Vaping Use: Never used   Substance and Sexual Activity   • Alcohol use:  Yes     Alcohol/week: 8 0 standard drinks     Types: 7 Glasses of wine, 1 Cans of beer per week     Comment: 1 glass beer/wine daily   • Drug use: No   • Sexual activity: Not Currently     Comment: medication not effective since radiation  Has ED  Other Topics Concern   • Not on file   Social History Narrative   • Not on file     Social Determinants of Health     Financial Resource Strain: Not on file   Food Insecurity: Not on file   Transportation Needs: Not on file   Physical Activity: Not on file   Stress: Not on file   Social Connections: Not on file   Intimate Partner Violence: Not on file   Housing Stability: Not on file     Social History     Tobacco Use   Smoking Status Never Smoker   Smokeless Tobacco Never Used     Social History     Substance and Sexual Activity   Alcohol Use Yes   • Alcohol/week: 8 0 standard drinks   • Types: 7 Glasses of wine, 1 Cans of beer per week    Comment: 1 glass beer/wine daily       Labs & Results:  Below is the patient's most recent value for Albumin, ALT, AST, BUN, Calcium, Chloride, Cholesterol, CO2, Creatinine, GFR, Glucose, HDL, Hematocrit, Hemoglobin, Hemoglobin A1C, LDL, Magnesium, Phosphorus, Platelets, Potassium, PSA, Sodium, Triglycerides, and WBC  Lab Results   Component Value Date    ALT 24 08/27/2022    AST 24 08/27/2022    BUN 18 08/27/2022    CALCIUM 8 3 08/27/2022     08/27/2022    CO2 26 08/27/2022    CREATININE 0 94 08/27/2022    HDL 59 08/27/2022    HCT 41 4 08/27/2022    HGB 13 8 08/27/2022     08/27/2022    K 4 5 08/27/2022    PSA 0 2 12/06/2021    TRIG 41 08/27/2022    WBC 4 23 (L) 08/27/2022     Note: for a comprehensive list of the patient's lab results, access the Results Review activity  Cardiac testing:   No results found for this or any previous visit  No results found for this or any previous visit  No results found for this or any previous visit  No results found for this or any previous visit

## 2022-10-18 ENCOUNTER — OFFICE VISIT (OUTPATIENT)
Dept: CARDIOLOGY CLINIC | Facility: CLINIC | Age: 77
End: 2022-10-18
Payer: MEDICARE

## 2022-10-18 VITALS
SYSTOLIC BLOOD PRESSURE: 126 MMHG | HEART RATE: 54 BPM | DIASTOLIC BLOOD PRESSURE: 62 MMHG | HEIGHT: 68 IN | BODY MASS INDEX: 21.61 KG/M2 | WEIGHT: 142.6 LBS

## 2022-10-18 DIAGNOSIS — I49.5 SICK SINUS SYNDROME (HCC): ICD-10-CM

## 2022-10-18 DIAGNOSIS — Z95.0 PACEMAKER: ICD-10-CM

## 2022-10-18 DIAGNOSIS — Z95.0 PRESENCE OF PERMANENT CARDIAC PACEMAKER: Primary | ICD-10-CM

## 2022-10-18 DIAGNOSIS — E78.2 MIXED HYPERLIPIDEMIA: ICD-10-CM

## 2022-10-18 DIAGNOSIS — R93.5 ABNORMAL CT OF THE ABDOMEN: Chronic | ICD-10-CM

## 2022-10-18 DIAGNOSIS — K43.9 EPIGASTRIC HERNIA: ICD-10-CM

## 2022-10-18 PROBLEM — T82.110A FRACTURED ATRIAL PACEMAKER LEAD WIRE: Status: ACTIVE | Noted: 2022-10-18

## 2022-10-18 PROCEDURE — 99214 OFFICE O/P EST MOD 30 MIN: CPT | Performed by: INTERNAL MEDICINE

## 2022-10-18 PROCEDURE — 93000 ELECTROCARDIOGRAM COMPLETE: CPT | Performed by: INTERNAL MEDICINE

## 2022-10-19 ENCOUNTER — TELEPHONE (OUTPATIENT)
Dept: ADMINISTRATIVE | Facility: OTHER | Age: 77
End: 2022-10-19

## 2022-10-19 ENCOUNTER — OFFICE VISIT (OUTPATIENT)
Dept: FAMILY MEDICINE CLINIC | Facility: CLINIC | Age: 77
End: 2022-10-19
Payer: MEDICARE

## 2022-10-19 VITALS
HEIGHT: 67 IN | WEIGHT: 141.6 LBS | DIASTOLIC BLOOD PRESSURE: 60 MMHG | TEMPERATURE: 97.6 F | BODY MASS INDEX: 22.22 KG/M2 | SYSTOLIC BLOOD PRESSURE: 125 MMHG | HEART RATE: 57 BPM | OXYGEN SATURATION: 98 %

## 2022-10-19 DIAGNOSIS — C61 PROSTATE CANCER (HCC): ICD-10-CM

## 2022-10-19 DIAGNOSIS — Z95.0 PACEMAKER: ICD-10-CM

## 2022-10-19 DIAGNOSIS — R93.5 ABNORMAL CT OF THE ABDOMEN: Chronic | ICD-10-CM

## 2022-10-19 DIAGNOSIS — E78.2 MIXED HYPERLIPIDEMIA: ICD-10-CM

## 2022-10-19 DIAGNOSIS — D73.89 SPLENIC LESION: ICD-10-CM

## 2022-10-19 DIAGNOSIS — I49.5 SICK SINUS SYNDROME (HCC): Primary | ICD-10-CM

## 2022-10-19 PROBLEM — R97.20 ELEVATED PSA: Status: RESOLVED | Noted: 2018-10-18 | Resolved: 2022-10-19

## 2022-10-19 PROBLEM — R59.1 LYMPHADENOPATHY: Chronic | Status: RESOLVED | Noted: 2020-11-17 | Resolved: 2022-10-19

## 2022-10-19 PROBLEM — M53.3 SI (SACROILIAC) JOINT DYSFUNCTION: Status: RESOLVED | Noted: 2021-12-29 | Resolved: 2022-10-19

## 2022-10-19 PROCEDURE — 99214 OFFICE O/P EST MOD 30 MIN: CPT | Performed by: FAMILY MEDICINE

## 2022-10-19 PROCEDURE — G0439 PPPS, SUBSEQ VISIT: HCPCS | Performed by: FAMILY MEDICINE

## 2022-10-19 NOTE — PROGRESS NOTES
Assessment/Plan:    No problem-specific Assessment & Plan notes found for this encounter  There are no diagnoses linked to this encounter  Subjective:      Patient ID: Laura Do is a 65 y o  male  PATIENT RETURNS FOR FOLLOW-UP OF CHRONIC MEDICAL CONDITIONS  ANY HOSPITAL VISITS, EMERGENCY VISITS AND OTHER PROVIDER VISITS SINCE LAST TIME WERE REVIEWED  MEDS WERE REVIEWED AND NO SIDE EFFECTS  NO NEW ISSUES  UNLESS NOTED BELOW  NO NEW MEDICAL PROVIDER REPORTED  THE CHRONIC DISEASES LISTED ABOVE ARE STABLE AND UNCHANGED/ THE PLAN OF CARE FOR THOSE WILL REMAIN UNCHANGED UNLESS NOTED BELOW     AWv/sub     Colon UTD       The following portions of the patient's history were reviewed and updated as appropriate: allergies, current medications, past family history, past medical history, past social history, past surgical history and problem list     Review of Systems   Constitutional: Negative for activity change and appetite change  HENT: Negative for trouble swallowing  Eyes: Negative for visual disturbance  Respiratory: Negative for cough and shortness of breath  Cardiovascular: Negative for chest pain, palpitations and leg swelling  Gastrointestinal: Negative for abdominal pain and blood in stool  Endocrine: Negative for polyuria  Genitourinary: Negative for difficulty urinating and hematuria  Skin: Negative for rash  Neurological: Negative for dizziness  Psychiatric/Behavioral: Negative for behavioral problems  Objective:  Vitals:    10/19/22 0957   BP: 125/60   BP Location: Right arm   Patient Position: Sitting   Cuff Size: Adult   Pulse: 57   Temp: 97 6 °F (36 4 °C)   TempSrc: Temporal   SpO2: 98%   Weight: 64 2 kg (141 lb 9 6 oz)   Height: 5' 7 1" (1 704 m)      Physical Exam  Constitutional:       Appearance: He is well-developed  HENT:      Head: Normocephalic and atraumatic     Eyes:      Conjunctiva/sclera: Conjunctivae normal    Neck:      Thyroid: No thyromegaly  Cardiovascular:      Rate and Rhythm: Normal rate and regular rhythm  Heart sounds: Normal heart sounds  No murmur heard  Pulmonary:      Effort: Pulmonary effort is normal  No respiratory distress  Breath sounds: Normal breath sounds  Musculoskeletal:      Cervical back: Neck supple  Lymphadenopathy:      Cervical: No cervical adenopathy  Skin:     General: Skin is warm and dry  Psychiatric:         Behavior: Behavior normal            Patient's chronic problems that were reviewed today are stable  Recent hospital stays reviewed  Recent labs and imaging reviewed  Recent visits to other providers reviewed  Meds reviewed and no changes made  Appropriate labs and imaging were ordered  Preventive measures appropriate for age and sex were reviewed with patient  Immunizations were updated as appropriate

## 2022-10-19 NOTE — TELEPHONE ENCOUNTER
Upon review of the In Basket request we have found that we are unable to obtain a copy of the exclusion documentation requested because Hep C is not found in blood work done in 2018  Thank you  Any additional questions or concerns should be emailed to the Practice Liaisons via the appropriate education email address, please do not reply via In Basket      Thank you  Karli Centeno

## 2022-10-19 NOTE — TELEPHONE ENCOUNTER
----- Message from Yane Domínguez sent at 10/19/2022  6:59 AM EDT -----  Regarding: Care Gap Request  10/19/22 6:59 AM    Hello, our patient attached above has had Hepatitis C completed/performed  Please assist in updating the patient chart by pulling the Care Everywhere (CE) document  The date of service is 09/17/2018       Thank you,  Faheem GUTIERRESFormerly [facility] [address] [facility] [place]

## 2022-10-19 NOTE — PROGRESS NOTES
Assessment and Plan:     Problem List Items Addressed This Visit    None          Preventive health issues were discussed with patient, and age appropriate screening tests were ordered as noted in patient's After Visit Summary  Personalized health advice and appropriate referrals for health education or preventive services given if needed, as noted in patient's After Visit Summary       History of Present Illness:     Patient presents for a Medicare Wellness Visit    HPI   Patient Care Team:  Abelardo Bernardo MD as PCP - General (Family Medicine)  Yue Lynn MD (Radiation Oncology)  Josefa Bey MD (Urology)     Review of Systems:     Review of Systems     Problem List:     Patient Active Problem List   Diagnosis   • Elevated PSA   • Prostate cancer [facility])   • Non-recurrent unilateral inguinal hernia without obstruction or gangrene   • Leukopenia   • Sick sinus syndrome ([facility] [address] )   • Mixed hyperlipidemia   • SSS (sick sinus syndrome) ([facility] [address] )   • Pacemaker   • Lymphadenopathy   • Left inguinal pain   • SI (sacroiliac) joint dysfunction   • Epigastric hernia   • Splenic lesion   • Abnormal CT of the abdomen   • Fractured atrial pacemaker lead wire      Past Medical and Surgical History:     Past Medical History:   Diagnosis Date   • Cancer ([facility] [address] )     skin   • Elevated PSA    • Hyperlipidemia    • Narcolepsy    • Pacemaker 1988    Symptomatic Bradycardia   • Prostate cancer [facility])      Past Surgical History:   Procedure Laterality Date   • ATRIAL CARDIAC PACEMAKER INSERTION     • CARDIAC CATHETERIZATION  02/12/2015   • COLONOSCOPY     • INSERTION OF FIDUCIAL MARKER (TRANSRECTAL ULTRASOUND GUIDANCE) N/A 2/3/2020    Procedure: INSERTION OF FIDUCIAL MARKER (TRANSRECTAL ULTRASOUND GUIDANCE) Ger Beltran;  Surgeon: Nga Schilling MD;  [facility];  Service: Urology   • [place] REPAIR [address] HERNIA,5+Y/O,REDUCIBL Left 12/20/2019    Procedure: INGUINAL HERNIA REPAIR;  Surgeon: Angus Rowe DO;  [facility];  Service: General   • SKIN CANCER EXCISION      left cheek      Family History:     Family History   Problem Relation Age of Onset   • Coronary artery disease Mother    • Coronary artery disease Father    • Skin cancer Father    • Hyperlipidemia Father    • Cancer Father         bladder cancer   • Coronary artery disease Brother       Social History:     Social History     Socioeconomic History   • Marital status: /Civil Union     Spouse name: None   • Number of children: 3   • Years of education: None   • Highest education level: None   Occupational History   • None   Tobacco Use   • Smoking status: Never Smoker   • Smokeless tobacco: Never Used   Vaping Use   • Vaping Use: Never used   Substance and Sexual Activity   • Alcohol use: Yes     Alcohol/week: 8 0 standard drinks     Types: 7 Glasses of wine, 1 Cans of beer per week     Comment: 1 glass beer/wine daily   • Drug use: No   • Sexual activity: Not Currently     Comment: medication not effective since radiation  Has ED     Other Topics Concern   • None   Social History Narrative   • None     Social Determinants of Health     Financial Resource Strain: Not on file   Food Insecurity: Not on file   Transportation Needs: Not on file   Physical Activity: Not on file   Stress: Not on file   Social Connections: Not on file   Intimate Partner Violence: Not on file   Housing Stability: Not on file      Medications and Allergies:     Current Outpatient Medications   Medication Sig Dispense Refill   • atorvastatin (LIPITOR) 20 mg tablet TAKE 1 TABLET BY MOUTH  DAILY IN THE EARLY MORNING 90 tablet 3   • cholecalciferol (VITAMIN D3) 1,000 units tablet Take 1,000 Units by mouth daily     • Cyanocobalamin (B-12) 1000 MCG CAPS Take by mouth daily     • ibuprofen (MOTRIN) 200 mg tablet Take 200 mg by mouth every 6 (six) hours as needed for mild pain     • modafinil (PROVIGIL) 200 MG tablet Take 200 mg by mouth daily     • triamcinolone (KENALOG) 0 5 % cream      • fluticasone (FLONASE) 50 mcg/act nasal spray 1 spray into each nostril 2 (two) times a day (Patient not taking: No sig reported) 16 g 5     No current facility-administered medications for this visit  No Known Allergies   Immunizations:     Immunization History   Administered Date(s) Administered   • COVID-19 MODERNA VACC 0 5 ML IM 01/11/2021, 02/08/2021, 11/01/2021, 05/06/2022   • H1N1, All Formulations 01/19/2010   • Hep A, adult 03/06/2014   • INFLUENZA 10/09/2014, 11/25/2016, 10/05/2018, 10/30/2019, 10/10/2020, 10/01/2021, 09/21/2022   • Influenza Split High Dose Preservative Free IM 10/29/2015   • Influenza, high dose seasonal 0 7 mL 10/30/2019, 09/21/2022   • Pneumococcal Conjugate 13-Valent 09/12/2017, 12/12/2019   • Pneumococcal Polysaccharide PPV23 01/19/2010, 02/29/2016, 04/08/2016   • Zoster 01/25/2010   • Zoster Vaccine Recombinant 06/01/2018, 06/14/2018, 08/27/2018, 08/27/2018      Health Maintenance:         Topic Date Due   • Colorectal Cancer Screening  10/05/2031   • Hepatitis C Screening  Completed     There are no preventive care reminders to display for this patient  Medicare Screening Tests and Risk Assessments:         Health Risk Assessment:   Patient rates overall health as very good  Patient feels that their physical health rating is same  Patient is very satisfied with their life  Eyesight was rated as same  Hearing was rated as same  Patient feels that their emotional and mental health rating is same  Patients states they are sometimes angry  Patient states they are never, rarely unusually tired/fatigued  Pain experienced in the last 7 days has been none  Patient states that he has experienced no weight loss or gain in last 6 months  Fall Risk Screening: In the past year, patient has experienced: no history of falling in past year      Home Safety:  Patient has trouble with stairs inside or outside of their home  Patient has working smoke alarms and has working carbon monoxide detector  Home safety hazards include: none  Nutrition:   Current diet is Regular  Medications:   Patient is currently taking over-the-counter supplements  OTC medications include: see medication list  Patient is able to manage medications  Activities of Daily Living (ADLs)/Instrumental Activities of Daily Living (IADLs):   Walk and transfer into and out of bed and chair?: Yes  Dress and groom yourself?: Yes    Bathe or shower yourself?: Yes    Feed yourself?  Yes  Do your laundry/housekeeping?: Yes  Manage your money, pay your bills and track your expenses?: Yes  Make your own meals?: Yes    Do your own shopping?: Yes    Previous Hospitalizations:   Any hospitalizations or ED visits within the last 12 months?: No      Advance Care Planning:   Living will: Yes    Advanced directive: Yes      PREVENTIVE SCREENINGS      Cardiovascular Screening:    General: Screening Not Indicated and History Lipid Disorder      Diabetes Screening:     General: Screening Current      Colorectal Cancer Screening:     General: Screening Current      Prostate Cancer Screening:    General: History Prostate Cancer and Screening Not Indicated      Lung Cancer Screening:     General: Screening Not Indicated      Hepatitis C Screening:    General: Screening Current    Screening, Brief Intervention, and Referral to Treatment (SBIRT)    Screening      Single Item Drug Screening:  How often have you used an illegal drug (including marijuana) or a prescription medication for non-medical reasons in the past year? never    Single Item Drug Screen Score: 0  Interpretation: Negative screen for possible drug use disorder    No exam data present     Physical Exam:     /60 (BP Location: Right arm, Patient Position: Sitting, Cuff Size: Adult)   Pulse 57   Temp 97 6 °F (36 4 °C) (Temporal)   Ht 5' 7 1" (1 704 m)   Wt 64 2 kg (141 lb 9 6 oz)   SpO2 98%   BMI 22 11 kg/m²     Physical Exam     Lolis Rogers MD

## 2022-10-19 NOTE — PROGRESS NOTES
Assessment and Plan:     Problem List Items Addressed This Visit        Cardiovascular and Mediastinum    Sick sinus syndrome ([address] ) - Primary     Soon to replace battery             Other    Abnormal CT of the abdomen (Chronic)    Mixed hyperlipidemia    Pacemaker    Splenic lesion           Preventive health issues were discussed with patient, and age appropriate screening tests were ordered as noted in patient's After Visit Summary  Personalized health advice and appropriate referrals for health education or preventive services given if needed, as noted in patient's After Visit Summary       History of Present Illness:     Patient presents for a Medicare Wellness Visit    HPI   Patient Care Team:  Regla Louis MD as PCP - General (Family Medicine)  Misael Murray MD (Radiation Oncology)  Bernard Callahan MD (Urology)     Review of Systems:     Review of Systems     Problem List:     Patient Active Problem List   Diagnosis   • Prostate cancer [facility])   • Non-recurrent unilateral inguinal hernia without obstruction or gangrene   • Leukopenia   • Sick sinus syndrome ([address] )   • Mixed hyperlipidemia   • Pacemaker   • Lymphadenopathy   • Left inguinal pain   • SI (sacroiliac) joint dysfunction   • Epigastric hernia   • Splenic lesion   • Abnormal CT of the abdomen   • Fractured atrial pacemaker lead wire      Past Medical and Surgical History:     Past Medical History:   Diagnosis Date   • Cancer ([address] )     skin   • Elevated PSA    • Hyperlipidemia    • Narcolepsy    • Pacemaker 1988    Symptomatic Bradycardia   • Prostate cancer [facility])      Past Surgical History:   Procedure Laterality Date   • ATRIAL CARDIAC PACEMAKER INSERTION     • CARDIAC CATHETERIZATION  02/12/2015   • COLONOSCOPY     • INSERTION OF FIDUCIAL MARKER (TRANSRECTAL ULTRASOUND GUIDANCE) N/A 2/3/2020    Procedure: INSERTION OF FIDUCIAL MARKER (TRANSRECTAL ULTRASOUND GUIDANCE) Zaki Miranda;  Surgeon: Eva Mcknight MD;  [facility];  Service: Urology   • MD REPAIR IN[place] HERNIA,5+Y/O,REDUCIBL Left 12/20/2019    Procedure: INGUINAL HERNIA REPAIR;  Surgeon: Amelia Dolan DO;  [facility];  Service: General   • SKIN CANCER EXCISION      left cheek      Family History:     Family History   Problem Relation Age of Onset   • Coronary artery disease Mother    • Coronary artery disease Father    • Skin cancer Father    • Hyperlipidemia Father    • Cancer Father         bladder cancer   • Coronary artery disease Brother       Social History:     Social History     Socioeconomic History   • Marital status: /Civil Union     Spouse name: None   • Number of children: 3   • Years of education: None   • Highest education level: None   Occupational History   • None   Tobacco Use   • Smoking status: Never Smoker   • Smokeless tobacco: Never Used   Vaping Use   • Vaping Use: Never used   Substance and Sexual Activity   • Alcohol use: Yes     Alcohol/week: 8 0 standard drinks     Types: 7 Glasses of wine, 1 Cans of beer per week     Comment: 1 glass beer/wine daily   • Drug use: No   • Sexual activity: Not Currently     Comment: medication not effective since radiation  Has ED  Other Topics Concern   • None   Social History Narrative   • None     Social Determinants of Health     Financial Resource Strain: Low Risk    • Difficulty of Paying Living Expenses: Not hard at all   Food Insecurity: Not on file   Transportation Needs: No Transportation Needs   • Lack of Transportation (Medical): No   • Lack of Transportation (Non-Medical):  No   Physical Activity: Not on file   Stress: Not on file   Social Connections: Not on file   Intimate Partner Violence: Not on file   Housing Stability: Not on file      Medications and Allergies:     Current Outpatient Medications   Medication Sig Dispense Refill   • atorvastatin (LIPITOR) 20 mg tablet TAKE 1 TABLET BY MOUTH  DAILY IN THE EARLY MORNING 90 tablet 3   • cholecalciferol (VITAMIN D3) 1,000 units tablet Take 1,000 Units by mouth daily     • Cyanocobalamin (B-12) 1000 MCG CAPS Take by mouth daily     • ibuprofen (MOTRIN) 200 mg tablet Take 200 mg by mouth every 6 (six) hours as needed for mild pain     • modafinil (PROVIGIL) 200 MG tablet Take 200 mg by mouth daily     • triamcinolone (KENALOG) 0 5 % cream      • fluticasone (FLONASE) 50 mcg/act nasal spray 1 spray into each nostril 2 (two) times a day (Patient not taking: No sig reported) 16 g 5     No current facility-administered medications for this visit  No Known Allergies   Immunizations:     Immunization History   Administered Date(s) Administered   • COVID-19 MODERNA VACC 0 5 ML IM 01/11/2021, 02/08/2021, 11/01/2021, 05/06/2022   • H1N1, All Formulations 01/19/2010   • Hep A, adult 03/06/2014   • INFLUENZA 10/09/2014, 11/25/2016, 10/05/2018, 10/30/2019, 10/10/2020, 10/01/2021, 09/21/2022   • Influenza Split High Dose Preservative Free IM 10/29/2015   • Influenza, high dose seasonal 0 7 mL 10/30/2019, 09/21/2022   • Pneumococcal Conjugate 13-Valent 09/12/2017, 12/12/2019   • Pneumococcal Polysaccharide PPV23 01/19/2010, 02/29/2016, 04/08/2016   • Zoster 01/25/2010   • Zoster Vaccine Recombinant 06/01/2018, 06/14/2018, 08/27/2018, 08/27/2018      Health Maintenance:         Topic Date Due   • Colorectal Cancer Screening  10/05/2031   • Hepatitis C Screening  Completed     There are no preventive care reminders to display for this patient  Medicare Screening Tests and Risk Assessments:     Korina Fuller is here for his Subsequent Wellness visit  Last Medicare Wellness visit information reviewed, patient interviewed, no change since last AWV  Previous Hospitalizations:   Any hospitalizations or ED visits within the last 12 months?: No      Advance Care Planning:   Living will: Yes    Durable POA for healthcare:  Yes    Advanced directive: Yes      PREVENTIVE SCREENINGS      Cardiovascular Screening:    General: Screening Not Indicated, History Lipid Disorder and Screening Current      Diabetes Screening:     General: Screening Current      Colorectal Cancer Screening:     General: Screening Current      Prostate Cancer Screening:    General: History Prostate Cancer and Screening Not Indicated      Osteoporosis Screening:    General: Screening Not Indicated      Lung Cancer Screening:     General: Screening Not Indicated      Hepatitis C Screening:    General: Screening Current    Screening, Brief Intervention, and Referral to Treatment (SBIRT)      Brief Intervention  Alcohol & drug use screenings were reviewed  No concerns regarding substance use disorder identified       No exam data present     Physical Exam:     /60 (BP Location: Right arm, Patient Position: Sitting, Cuff Size: Adult)   Pulse 57   Temp 97 6 °F (36 4 °C) (Temporal)   Ht 5' 7 1" (1 704 m)   Wt 64 2 kg (141 lb 9 6 oz)   SpO2 98%   BMI 22 11 kg/m²     Physical Exam     Kati Bray MD

## 2022-10-19 NOTE — PATIENT INSTRUCTIONS
Medicare Preventive Visit Patient Instructions  Thank you for completing your Welcome to Medicare Visit or Medicare Annual Wellness Visit today  Your next wellness visit will be due in one year (10/20/2023)  The screening/preventive services that you may require over the next 5-10 years are detailed below  Some tests may not apply to you based off risk factors and/or age  Screening tests ordered at today's visit but not completed yet may show as past due  Also, please note that scanned in results may not display below  Preventive Screenings:  Service Recommendations Previous Testing/Comments   Colorectal Cancer Screening  · Colonoscopy    · Fecal Occult Blood Test (FOBT)/Fecal Immunochemical Test (FIT)  · Fecal DNA/Cologuard Test  · Flexible Sigmoidoscopy Age: 39-70 years old   Colonoscopy: every 10 years (May be performed more frequently if at higher risk)  OR  FOBT/FIT: every 1 year  OR  Cologuard: every 3 years  OR  Sigmoidoscopy: every 5 years  Screening may be recommended earlier than age 39 if at higher risk for colorectal cancer  Also, an individualized decision between you and your healthcare provider will decide whether screening between the ages of 74-80 would be appropriate   Colonoscopy: 10/05/2021  FOBT/FIT: Not on file  Cologuard: Not on file  Sigmoidoscopy: Not on file    Screening Current  Screening Current     Prostate Cancer Screening Individualized decision between patient and health care provider in men between ages of 53-78   Medicare will cover every 12 months beginning on the day after your 50th birthday PSA: 0 2 ng/mL     History Prostate Cancer  Screening Not Indicated  History Prostate Cancer  Screening Not Indicated     Hepatitis C Screening Once for adults born between 1945 and 1965  More frequently in patients at high risk for Hepatitis C Hep C Antibody: Not on file    Screening Current  Screening Current   Diabetes Screening 1-2 times per year if you're at risk for diabetes or have pre-diabetes Fasting glucose: 101 mg/dL (8/27/2022)  A1C: No results in last 5 years (No results in last 5 years)  Screening Current  Screening Current   Cholesterol Screening Once every 5 years if you don't have a lipid disorder  May order more often based on risk factors  Lipid panel: 08/27/2022  Screening Not Indicated  History Lipid Disorder  Screening Not Indicated  History Lipid Disorder  Screening Current      Other Preventive Screenings Covered by Medicare:  1  Abdominal Aortic Aneurysm (AAA) Screening: covered once if your at risk  You're considered to be at risk if you have a family history of AAA or a male between the age of 73-68 who smoking at least 100 cigarettes in your lifetime  2  Lung Cancer Screening: covers low dose CT scan once per year if you meet all of the following conditions: (1) Age 50-69; (2) No signs or symptoms of lung cancer; (3) Current smoker or have quit smoking within the last 15 years; (4) You have a tobacco smoking history of at least 20 pack years (packs per day x number of years you smoked); (5) You get a written order from a healthcare provider  3  Glaucoma Screening: covered annually if you're considered high risk: (1) You have diabetes OR (2) Family history of glaucoma OR (3)  aged 48 and older OR (3)  American aged 72 and older  3  Osteoporosis Screening: covered every 2 years if you meet one of the following conditions: (1) Have a vertebral abnormality; (2) On glucocorticoid therapy for more than 3 months; (3) Have primary hyperparathyroidism; (4) On osteoporosis medications and need to assess response to drug therapy  5  HIV Screening: covered annually if you're between the age of 12-76  Also covered annually if you are younger than 13 and older than 72 with risk factors for HIV infection  For pregnant patients, it is covered up to 3 times per pregnancy      Immunizations:  Immunization Recommendations   Influenza Vaccine Annual influenza vaccination during flu season is recommended for all persons aged >= 6 months who do not have contraindications   Pneumococcal Vaccine   * Pneumococcal conjugate vaccine = PCV13 (Prevnar 13), PCV15 (Vaxneuvance), PCV20 (Prevnar 20)  * Pneumococcal polysaccharide vaccine = PPSV23 (Pneumovax) Adults 25-60 years old: 1-3 doses may be recommended based on certain risk factors  Adults 72 years old: 1-2 doses may be recommended based off what pneumonia vaccine you previously received   Hepatitis B Vaccine 3 dose series if at intermediate or high risk (ex: diabetes, end stage renal disease, liver disease)   Tetanus (Td) Vaccine - COST NOT COVERED BY MEDICARE PART B Following completion of primary series, a booster dose should be given every 10 years to maintain immunity against tetanus  Td may also be given as tetanus wound prophylaxis  Tdap Vaccine - COST NOT COVERED BY MEDICARE PART B Recommended at least once for all adults  For pregnant patients, recommended with each pregnancy  Shingles Vaccine (Shingrix) - COST NOT COVERED BY MEDICARE PART B  2 shot series recommended in those aged 48 and above     Health Maintenance Due:      Topic Date Due   • Colorectal Cancer Screening  10/05/2031   • Hepatitis C Screening  Completed     Immunizations Due:  There are no preventive care reminders to display for this patient  Advance Directives   What are advance directives? Advance directives are legal documents that state your wishes and plans for medical care  These plans are made ahead of time in case you lose your ability to make decisions for yourself  Advance directives can apply to any medical decision, such as the treatments you want, and if you want to donate organs  What are the types of advance directives? There are many types of advance directives, and each state has rules about how to use them  You may choose a combination of any of the following:  · Living will:   This is a written record of the treatment you want  You can also choose which treatments you do not want, which to limit, and which to stop at a certain time  This includes surgery, medicine, IV fluid, and tube feedings  · Durable power of  for healthcare [facility]): This is a written record that states who you want to make healthcare choices for you when you are unable to make them for yourself  This person, called a proxy, is usually a family member or a friend  You may choose more than 1 proxy  · Do not resuscitate (DNR) order:  A DNR order is used in case your heart stops beating or you stop breathing  It is a request not to have certain forms of treatment, such as CPR  A DNR order may be included in other types of advance directives  · Medical directive: This covers the care that you want if you are in a coma, near death, or unable to make decisions for yourself  You can list the treatments you want for each condition  Treatment may include pain medicine, surgery, blood transfusions, dialysis, IV or tube feedings, and a ventilator (breathing machine)  · Values history: This document has questions about your views, beliefs, and how you feel and think about life  This information can help others choose the care that you would choose  Why are advance directives important? An advance directive helps you control your care  Although spoken wishes may be used, it is better to have your wishes written down  Spoken wishes can be misunderstood, or not followed  Treatments may be given even if you do not want them  An advance directive may make it easier for your family to make difficult choices about your care  © Copyright [employer] 2018 Information is for End User's use only and may not be sold, redistributed or otherwise used for commercial purposes   All illustrations and images included in CareNotes® are the copyrighted property of [employer]  or [email or web address].

## 2022-11-08 ENCOUNTER — HOSPITAL ENCOUNTER (OUTPATIENT)
Dept: INTERVENTIONAL RADIOLOGY/VASCULAR | Facility: HOSPITAL | Age: 77
Discharge: HOME | End: 2022-11-08
Attending: INTERNAL MEDICINE

## 2022-11-08 ENCOUNTER — APPOINTMENT (OUTPATIENT)
Dept: LAB | Facility: HOSPITAL | Age: 77
End: 2022-11-08

## 2022-11-08 DIAGNOSIS — C61 PROSTATE CANCER (HCC): ICD-10-CM

## 2022-11-08 DIAGNOSIS — Z95.0 PRESENCE OF PERMANENT CARDIAC PACEMAKER: ICD-10-CM

## 2022-11-08 DIAGNOSIS — I49.5 SICK SINUS SYNDROME (HCC): ICD-10-CM

## 2022-11-08 LAB — PSA SERPL-MCNC: 0.2 NG/ML (ref 0–4)

## 2022-11-08 RX ADMIN — IOHEXOL 18 ML: 350 INJECTION, SOLUTION INTRAVENOUS at 09:27

## 2022-11-08 NOTE — BRIEF OP NOTE (RAD/CATH)
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

## 2022-11-18 ENCOUNTER — IN-CLINIC DEVICE VISIT (OUTPATIENT)
Dept: CARDIOLOGY CLINIC | Facility: CLINIC | Age: 77
End: 2022-11-18

## 2022-11-18 DIAGNOSIS — Z95.0 CARDIAC PACEMAKER IN SITU: Primary | ICD-10-CM

## 2022-11-18 NOTE — PROGRESS NOTES
Results for orders placed or performed in visit on 11/18/22   Cardiac EP device report    Narrative    SJM-DUAL CHAMBER PPM  (DDD MODE) - NOT MRI CONDITIONAL  NB/BAT-DEVICE INTERROGATED IN THE [place] OFFICE: BATTERY VOLTAGE NEARING IMER 0-6 MONS  WILL SCHEDULE MONTHLY BATTERY CHECKS  AP 11%  13%  ALL AVAILABLE LEAD PARAMETERS WITHIN NORMAL LIMITS  NO SIGNIFICANT HIGH RATE EPISODES  NO PROGRAMMING CHANGES MADE TO DEVICE PARAMETERS  NORMAL DEVICE FUNCTION   NC

## 2022-12-02 ENCOUNTER — APPOINTMENT (OUTPATIENT)
Dept: RADIATION ONCOLOGY | Facility: CLINIC | Age: 77
End: 2022-12-02
Attending: RADIOLOGY

## 2022-12-02 ENCOUNTER — RADIATION ONCOLOGY FOLLOW-UP (OUTPATIENT)
Dept: RADIATION ONCOLOGY | Facility: CLINIC | Age: 77
End: 2022-12-02
Attending: RADIOLOGY

## 2022-12-02 VITALS
HEART RATE: 56 BPM | TEMPERATURE: 97.8 F | OXYGEN SATURATION: 99 % | BODY MASS INDEX: 21.58 KG/M2 | SYSTOLIC BLOOD PRESSURE: 144 MMHG | HEIGHT: 68 IN | WEIGHT: 142.42 LBS | DIASTOLIC BLOOD PRESSURE: 81 MMHG

## 2022-12-02 DIAGNOSIS — C61 PROSTATE CANCER (HCC): Primary | ICD-10-CM

## 2022-12-02 NOTE — PROGRESS NOTES
Follow-up - Radiation Oncology   Hermilo Martin 1945 65 y o  male [id number]      History of Present Illness   Cancer Staging   No matching staging information was found for the patient  Interval History:    Hermilo Martin 1945 is a 65 y o  male [address] 69 year old male with adenocarcinoma [place] score 7 (3+ 4) of the prostate  He was initially diagnosed with [place] 6 (3+3) prostate cancer in 11/2018 and was on active surveillance  He had repeat prostate biopsy Sept 2019, revealing Hi 7 (3+4) disease  He completed a course of definitive radiation therapy to the prostate on 4/22/2020  He was last seen by radiation on 12/13/2021  Here for f/u       3/8/22 Urology Note:   Urinary s/s persist, consider Pelvic floor therapy with PT, ED failed therapy with PDE 5 inhibitors, discussed vacuum assisted device  F/U 1 yr                   Lab Results   Component Value Date     PSA 0 2 11/08/2022     PSA 0 2 12/06/2021     PSA 0 3 05/24/2021 9/6/22 CT A/P w/IV Contrast: D/T Ventral Hernia w/o Gangrene   IMPRESSION:   REPRODUCTIVE ORGANS:  Mildly enlarged prostate with fiducial markers in place  Symmetric seminal vesicles  1   Mild diastases of the rectus abdominis muscles without hernia  2   No acute findings in the abdomen or pelvis  3   Multiple splenic lesions measuring up to 9 mm with indeterminate imaging features  Per ACR consensus for the management of incidentally detected splenic lesions < 1 cm with indeterminate imaging features in a patient with history of malignancy, recommend follow-up contrast enhanced MRI abdomen in 6 months, and if stable again at 12 months                Historical Information   Oncology History   Prostate cancer ([facility] [address] )   11/5/2018 Initial Diagnosis    Prostate cancer ([facility] [address] )     11/5/2018 Biopsy    A   Prostate, Right Peripheral Zone, Biopsy:  - Prostatic adenocarcinoma, [place] Pattern 3 + 3 ([place] Score 6), Grade Group 1; involving 4 of 6 cores, 20 5 linear mm of carcinoma (approximately 50% of total tissue)      B  Prostate, Right Central Zone, Biopsy:  - Prostatic adenocarcinoma, [place] Pattern 3 + 3 ([place] Score 6), Grade Group 1; involving 1 of 2 cores, 9 linear mm of carcinoma (approximately 30% of total tissue)      C  Prostate, Left Peripheral Zone, Biopsy:  - Atypical small acinar proliferation (ASAP), focally present in 1 of 2 cores      D  Prostate, Left Central Zone, Biopsy:  - Prostatic adenocarcinoma, Hi Pattern 3 + 3 (Mellen Score 6), Grade Group 1; discontinuously involving 4 of 4 cores, 9 linear mm of carcinoma (approximately 20% of total tissue)  - High-grade prostatic intraepithelial neoplasia (HGPIN)  Dr Lisbet Damico       9/12/2019 Biopsy    A  Right lateral base prostate core biopsy:  - Adenocarcinoma, acinar type, Hi score 3+4=7, Grade Group 2  - Pattern 4 - 5% of tumor  - Continuously involving 80% of core  - No perineural invasion seen     B  Right lateral mid prostate core biopsy:  - Benign prostatic tissue     C  Right lateral apex prostate core biopsy:  - Benign prostatic tissue     D  Right medial base prostate core biopsy:  - Adenocarcinoma, acinar type, Hi score 3+4=7, Grade Group 2  - Pattern 4 - 10% of tumor  - Discontinuously involving 45% of core  - Perineural invasion is seen     E  Right medial mid prostate core biopsy:  - Adenocarcinoma, acinar type, Hi score 3+4=7, Grade Group 2  - Pattern 4 - 10% of tumor  - Continuously involving 10% of core  - No perineural invasion seen     F  Right medial apex prostate core biopsy:  - Benign prostatic tissue     G  Left medial base  Prostate core biopsy:  - Adenocarcinoma, acinar type, Hi score 3+4=7, Grade Group 2  - Pattern 4 - 25% of tumor  - Continuously involving 5% of core  - No perineural invasion seen     H  Left medial mid prostate core biopsy:  - Benign prostatic tissue     I   Left medial apex prostate core bopsy:  - Benign prostatic tissue     J  Left lateral base prostate core biopsy:   - Adenocarcinoma, acinar type, Hi score 3+4=7, Grade Group 2  - Pattern 4 - 20% of tumor  - Discontinuously involving 25% of core  - No perineural invasion seen     K  Left lateral mid prostate core biopsy:   - Adenocarcinoma, acinar type, [place] score 3+4=7, Grade Group 2  - Pattern 4 - 5% of tumor  - Continuously involving 10% of core  - No perineural invasion seen     L   Left lateral apex prostate core biopsy:  - Benign prostatic tissue     2/20/2020 - 4/22/2020 Radiation    Plan ID Energy Fractions Dose per Fraction (cGy) Dose Correction (cGy) Total Dose Delivered (cGy) Elapsed Days   Prost ProxSV 10X 31 / 44 180 0 5,580 43   Rev ProstPSV 10X 13 / 13 180 0 2,340 16              Past Medical History:   Diagnosis Date   • Cancer ([facility] [address] )     skin   • Elevated PSA    • Hyperlipidemia    • Narcolepsy    • Pacemaker 1988    Symptomatic Bradycardia   • Prostate cancer [facility])      Past Surgical History:   Procedure Laterality Date   • ATRIAL CARDIAC PACEMAKER INSERTION     • CARDIAC CATHETERIZATION  02/12/2015   • COLONOSCOPY     • INSERTION OF FIDUCIAL MARKER (TRANSRECTAL ULTRASOUND GUIDANCE) N/A 2/3/2020    Procedure: INSERTION OF FIDUCIAL MARKER (TRANSRECTAL ULTRASOUND GUIDANCE) Joni Jaime;  Surgeon: Dwaine Levine MD;  Location: [facility];  Service: Urology   • IR UPPER EXTREMITY VENOGRAM- DIAGNOSTIC  11/8/2022   • MT REPAIR [address] HERNIA,5+Y/O,REDUCIBL Left 12/20/2019    Procedure: INGUINAL HERNIA REPAIR;  Surgeon: Lilia Muñoz DO;  Location: [facility];  Service: General   • SKIN CANCER EXCISION      left cheek       Social History   Social History     Substance and Sexual Activity   Alcohol Use Yes   • Alcohol/week: 8 0 standard drinks   • Types: 7 Glasses of wine, 1 Cans of beer per week    Comment: 1 glass beer/wine daily     Social History     Substance and Sexual Activity   Drug Use No     Social History     Tobacco Use   Smoking Status Never Smokeless Tobacco Never         Meds/Allergies     Current Outpatient Medications:   •  atorvastatin (LIPITOR) 20 mg tablet, TAKE 1 TABLET BY MOUTH  DAILY IN THE EARLY MORNING, Disp: 90 tablet, Rfl: 3  •  cholecalciferol (VITAMIN D3) 1,000 units tablet, Take 1,000 Units by mouth daily, Disp: , Rfl:   •  Cyanocobalamin (B-12) 1000 MCG CAPS, Take by mouth daily, Disp: , Rfl:   •  ibuprofen (MOTRIN) 200 mg tablet, Take 200 mg by mouth every 6 (six) hours as needed for mild pain, Disp: , Rfl:   •  modafinil (PROVIGIL) 200 MG tablet, Take 200 mg by mouth daily, Disp: , Rfl:   •  fluticasone (FLONASE) 50 mcg/act nasal spray, 1 spray into each nostril 2 (two) times a day (Patient not taking: No sig reported), Disp: 16 g, Rfl: 5  •  triamcinolone (KENALOG) 0 5 % cream, , Disp: , Rfl:   No Known Allergies      Review of Systems   Constitutional: Negative  Negative for fatigue  HENT: Negative  Eyes: Negative  Wears glasses   Respiratory: Negative  Negative for shortness of breath  Cardiovascular: Negative  Negative for chest pain  Gastrointestinal: Negative  Negative for constipation and diarrhea  Endocrine: Negative  Genitourinary: Positive for frequency  Negative for difficulty urinating, dysuria, hematuria and urgency  Nocturia x2,   Occasional weak stream      Musculoskeletal: Negative  Possible ventral hernia/non painful    Skin: Negative  Allergic/Immunologic: Negative  Neurological: Negative  Hematological: Negative  Psychiatric/Behavioral: Negative           Clinical Trial: no     IPSS Questionnaire (AUA-7): Over the past month…     1)  How often have you had a sensation of not emptying your bladder completely after you finish urinating? 0 - Not at all   2)  How often have you had to urinate again less than two hours after you finished urinating?  1 - Less than 1 time in 5   3)  How often have you found you stopped and started again several times when you urinated? 0 - Not at all   4) How difficult have you found it to postpone urination? 0 - Not at all   5) How often have you had a weak urinary stream?  1 - Less than 1 time in 5   6) How often have you had to push or strain to begin urination? 0 - Not at all   7) How many times did you most typically get up to urinate from the time you went to bed until the time you got up in the morning? 2 - 2 times   Total Score:  4            OBJECTIVE:   /81   Pulse 56   Temp 97 8 °F (36 6 °C)   Ht 5' 7 99" (1 727 m)   Wt 64 6 kg (142 lb 6 7 oz)   SpO2 99%   BMI 21 66 kg/m²   Pain Assessment:  0  ECOG/Zubrod/WHO: 0 - Asymptomatic    Physical Exam   He is conversing appropriately  His breathing is unlabored  Ambulating independently  RESULTS    Lab Results:   Recent Results (from the past 672 hour(s))   PSA Total, Diagnostic    Collection Time: 11/08/22  9:51 AM   Result Value Ref Range    PSA, Diagnostic 0 2 0 0 - 4 0 ng/mL       Imaging Studies:IR upper extremity venogram- Diagnostic    Result Date: 11/8/2022  Narrative: HISTORY:  Pacemaker implant here for venogram of right upper extremity for preop evaluation  PROCEDURE:  The patient was brought to the Interventional Radiology Suite and identified verbally and by wrist band  Initial fluoroscopic image demonstrates a right chest wall pacemaker with one of the leads demonstrating interruption similar to prior chest x-ray An IV was  placed in the right antecubital vein  Contrast was injected under fluoroscopy and a venogram performed  The brachial, axillary and proximal subclavian vein is patent  Central subclavian vein and innominate vein demonstrates irregularity with narrowing with a large proximal collateral      Impression: Central subclavian and innominate vein irregularity with narrowing with large proximal collateral secondary to the pacemaker wires   Workstation performed: [id number]     Cardiac EP device report    Result Date: 11/18/2022  Narrative: SJM-DUAL CHAMBER PPM  (DDD MODE) - NOT MRI CONDITIONAL NB/BAT-DEVICE INTERROGATED IN THE [facility] OFFICE: BATTERY VOLTAGE NEARING IMER 0-6 MONS  WILL SCHEDULE MONTHLY BATTERY CHECKS  AP 11%  13%  ALL AVAILABLE LEAD PARAMETERS WITHIN NORMAL LIMITS  NO SIGNIFICANT HIGH RATE EPISODES  NO PROGRAMMING CHANGES MADE TO DEVICE PARAMETERS  NORMAL DEVICE FUNCTION  NC           Assessment/Plan:  Orders Placed This Encounter   Procedures   • PSA Total, Diagnostic        Abebe Baron is a 65y o  year old male is 2 and half years post definitive radiation therapy for favorable intermediate risk prostate carcinoma  He is tolerated treatment well without any significant side effects  His PSA remains 0 2  I have ordered follow-up PSA in 6 months prior to his visit with Urology  States his nocturia has improved from 4 times a night to 2 times at night with Kegel exercises  He will follow-up here in 1 year  Opal Dunbar MD  12/2/2022,9:54 AM    Portions of the record may have been created with voice recognition software   Occasional wrong word or "sound a like" substitutions may have occurred due to the inherent limitations of voice recognition software   Read the chart carefully and recognize, using context, where substitutions have occurred

## 2022-12-02 NOTE — PROGRESS NOTES
Danita Morales 1945 is a 65 y o  male Danita Morales is a 65year old male with adenocarcinoma [place] score 7 (3+ 4) of the prostate  He was initially diagnosed with [place] 6 (3+3) prostate cancer in 11/2018 and was on active surveillance  He had repeat prostate biopsy Sept 2019, revealing Hi 7 (3+4) disease  He completed a course of definitive radiation therapy to the prostate on 4/22/2020  He was last seen by radiation on 12/13/2021  Here for f/u      3/8/22 Urology Note:   Urinary s/s persist, consider Pelvic floor therapy with PT, ED failed therapy with PDE 5 inhibitors, discussed vacuum assisted device  F/U 1 yr  Lab Results   Component Value Date    PSA 0 2 11/08/2022    PSA 0 2 12/06/2021    PSA 0 3 05/24/2021 9/6/22 CT A/P w/IV Contrast: D/T Ventral Hernia w/o Gangrene   IMPRESSION:   REPRODUCTIVE ORGANS:  Mildly enlarged prostate with fiducial markers in place  Symmetric seminal vesicles  1   Mild diastases of the rectus abdominis muscles without hernia  2   No acute findings in the abdomen or pelvis  3   Multiple splenic lesions measuring up to 9 mm with indeterminate imaging features  Per ACR consensus for the management of incidentally detected splenic lesions < 1 cm with indeterminate imaging features in a patient with history of malignancy, recommend follow-up contrast enhanced MRI abdomen in 6 months, and if stable again at 12 months  Upcoming: None       Follow up visit     Oncology History   Prostate cancer ([facility] [address] )   11/5/2018 Initial Diagnosis    Prostate cancer ([facility] [address] )     11/5/2018 Biopsy    A  Prostate, Right Peripheral Zone, Biopsy:  - Prostatic adenocarcinoma, Hi Pattern 3 + 3 (Hi Score 6), Grade Group 1; involving 4 of 6 cores, 20 5 linear mm of carcinoma (approximately 50% of total tissue)      B   Prostate, Right Central Zone, Biopsy:  - Prostatic adenocarcinoma, Hi Pattern 3 + 3 (Hi Score 6), Grade Group 1; involving 1 of 2 cores, 9 linear mm of carcinoma (approximately 30% of total tissue)      C  Prostate, Left Peripheral Zone, Biopsy:  - Atypical small acinar proliferation (ASAP), focally present in 1 of 2 cores      D  Prostate, Left Central Zone, Biopsy:  - Prostatic adenocarcinoma, [place] Pattern 3 + 3 ([place] Score 6), Grade Group 1; discontinuously involving 4 of 4 cores, 9 linear mm of carcinoma (approximately 20% of total tissue)  - High-grade prostatic intraepithelial neoplasia (HGPIN)  Dr Yadi Matthews       9/12/2019 Biopsy    A  Right lateral base prostate core biopsy:  - Adenocarcinoma, acinar type, [place] score 3+4=7, Grade Group 2  - Pattern 4 - 5% of tumor  - Continuously involving 80% of core  - No perineural invasion seen     B  Right lateral mid prostate core biopsy:  - Benign prostatic tissue     C  Right lateral apex prostate core biopsy:  - Benign prostatic tissue     D  Right medial base prostate core biopsy:  - Adenocarcinoma, acinar type, Hi score 3+4=7, Grade Group 2  - Pattern 4 - 10% of tumor  - Discontinuously involving 45% of core  - Perineural invasion is seen     E  Right medial mid prostate core biopsy:  - Adenocarcinoma, acinar type, Hi score 3+4=7, Grade Group 2  - Pattern 4 - 10% of tumor  - Continuously involving 10% of core  - No perineural invasion seen     F  Right medial apex prostate core biopsy:  - Benign prostatic tissue     G  Left medial base  Prostate core biopsy:  - Adenocarcinoma, acinar type, Hi score 3+4=7, Grade Group 2  - Pattern 4 - 25% of tumor  - Continuously involving 5% of core  - No perineural invasion seen     H  Left medial mid prostate core biopsy:  - Benign prostatic tissue     I  Left medial apex prostate core bopsy:  - Benign prostatic tissue     J   Left lateral base prostate core biopsy:   - Adenocarcinoma, acinar type, [place] score 3+4=7, Grade Group 2  - Pattern 4 - 20% of tumor  - Discontinuously involving 25% of core  - No perineural invasion seen     K  Left lateral mid prostate core biopsy:   - Adenocarcinoma, acinar type, Hi score 3+4=7, Grade Group 2  - Pattern 4 - 5% of tumor  - Continuously involving 10% of core  - No perineural invasion seen     L  Left lateral apex prostate core biopsy:  - Benign prostatic tissue     2/20/2020 - 4/22/2020 Radiation    Plan ID Energy Fractions Dose per Fraction (cGy) Dose Correction (cGy) Total Dose Delivered (cGy) Elapsed Days   Prost ProxSV 10X 31 / 44 180 0 5,580 43   Rev ProstPSV 10X 13 / 13 180 0 2,340 16              Review of Systems:  Review of Systems   Constitutional: Negative  Negative for fatigue  HENT: Negative  Eyes: Negative  Wears glasses   Respiratory: Negative  Negative for shortness of breath  Cardiovascular: Negative  Negative for chest pain  Gastrointestinal: Negative  Negative for constipation and diarrhea  Endocrine: Negative  Genitourinary: Positive for frequency  Negative for difficulty urinating, dysuria, hematuria and urgency  Nocturia x2,   Occasional weak stream      Musculoskeletal: Negative  Possible ventral hernia/non painful    Skin: Negative  Allergic/Immunologic: Negative  Neurological: Negative  Hematological: Negative  Psychiatric/Behavioral: Negative  Clinical Trial: no    IPSS Questionnaire (AUA-7): Over the past month…    1)  How often have you had a sensation of not emptying your bladder completely after you finish urinating? 0 - Not at all   2)  How often have you had to urinate again less than two hours after you finished urinating? 1 - Less than 1 time in 5   3)  How often have you found you stopped and started again several times when you urinated? 0 - Not at all   4) How difficult have you found it to postpone urination? 0 - Not at all   5) How often have you had a weak urinary stream?  1 - Less than 1 time in 5   6) How often have you had to push or strain to begin urination?   0 - Not at all   7) How many times did you most typically get up to urinate from the time you went to bed until the time you got up in the morning?   2 - 2 times   Total Score:  4       Teaching completed    Health Maintenance   Topic Date Due   • Hepatitis B Vaccine (1 of 3 - 3-dose series) Never done   • Depression Screening  08/26/2023   • Fall Risk  10/19/2023   • Medicare Annual Wellness Visit (AWV)  10/19/2023   • BMI: Adult  10/19/2023   • Colorectal Cancer Screening  10/05/2031   • Hepatitis C Screening  Completed   • Pneumococcal Vaccine: 65+ Years  Completed   • Influenza Vaccine  Completed   • COVID-19 Vaccine  Completed   • HIB Vaccine  Aged Out   • IPV Vaccine  Aged Out   • Hepatitis A Vaccine  Aged Out   • Meningococcal ACWY Vaccine  Aged Out   • HPV Vaccine  Aged Out     Patient Active Problem List   Diagnosis   • Prostate cancer ([facility] [address] )   • Non-recurrent unilateral inguinal hernia without obstruction or gangrene   • Leukopenia   • Sick sinus syndrome ([facility] [address] )   • Mixed hyperlipidemia   • Pacemaker   • Left inguinal pain   • Epigastric hernia   • Splenic lesion   • Abnormal CT of the abdomen   • Fractured atrial pacemaker lead wire     Past Medical History:   Diagnosis Date   • Cancer ([facility] [address] )     skin   • Elevated PSA    • Hyperlipidemia    • Narcolepsy    • Pacemaker 1988    Symptomatic Bradycardia   • Prostate cancer [facility])      Past Surgical History:   Procedure Laterality Date   • ATRIAL CARDIAC PACEMAKER INSERTION     • CARDIAC CATHETERIZATION  02/12/2015   • COLONOSCOPY     • INSERTION OF FIDUCIAL MARKER (TRANSRECTAL ULTRASOUND GUIDANCE) N/A 2/3/2020    Procedure: INSERTION OF FIDUCIAL MARKER (TRANSRECTAL ULTRASOUND GUIDANCE) Matthieu Alvarado;  Surgeon: Edris Primrose, MD;  Location: [facility];  Service: Urology   • IR UPPER EXTREMITY VENOGRAM- DIAGNOSTIC  11/8/2022   • OH REPAIR ING HERNIA,5+Y/O,REDUCIBL Left 12/20/2019    Procedure: INGUINAL HERNIA REPAIR;  Surgeon: Melecio Melvin DO;  Location: [facility];  Service: General • SKIN CANCER EXCISION      left cheek     Family History   Problem Relation Age of Onset   • Coronary artery disease Mother    • Coronary artery disease Father    • Skin cancer Father    • Hyperlipidemia Father    • Cancer Father         bladder cancer   • Coronary artery disease Brother      Social History     Socioeconomic History   • Marital status: /Civil Union     Spouse name: Not on file   • Number of children: 3   • Years of education: Not on file   • Highest education level: Not on file   Occupational History   • Not on file   Tobacco Use   • Smoking status: Never   • Smokeless tobacco: Never   Vaping Use   • Vaping Use: Never used   Substance and Sexual Activity   • Alcohol use: Yes     Alcohol/week: 8 0 standard drinks     Types: 7 Glasses of wine, 1 Cans of beer per week     Comment: 1 glass beer/wine daily   • Drug use: No   • Sexual activity: Not Currently     Comment: medication not effective since radiation  Has ED  Other Topics Concern   • Not on file   Social History Narrative   • Not on file     Social Determinants of Health     Financial Resource Strain: Low Risk    • Difficulty of Paying Living Expenses: Not hard at all   Food Insecurity: Not on file   Transportation Needs: No Transportation Needs   • Lack of Transportation (Medical): No   • Lack of Transportation (Non-Medical):  No   Physical Activity: Not on file   Stress: Not on file   Social Connections: Not on file   Intimate Partner Violence: Not on file   Housing Stability: Not on file       Current Outpatient Medications:   •  atorvastatin (LIPITOR) 20 mg tablet, TAKE 1 TABLET BY MOUTH  DAILY IN THE EARLY MORNING, Disp: 90 tablet, Rfl: 3  •  cholecalciferol (VITAMIN D3) 1,000 units tablet, Take 1,000 Units by mouth daily, Disp: , Rfl:   •  Cyanocobalamin (B-12) 1000 MCG CAPS, Take by mouth daily, Disp: , Rfl:   •  fluticasone (FLONASE) 50 mcg/act nasal spray, 1 spray into each nostril 2 (two) times a day (Patient not taking: No sig reported), Disp: 16 g, Rfl: 5  •  ibuprofen (MOTRIN) 200 mg tablet, Take 200 mg by mouth every 6 (six) hours as needed for mild pain, Disp: , Rfl:   •  modafinil (PROVIGIL) 200 MG tablet, Take 200 mg by mouth daily, Disp: , Rfl:   •  triamcinolone (KENALOG) 0 5 % cream, , Disp: , Rfl:   No Known Allergies  There were no vitals filed for this visit

## 2022-12-12 ENCOUNTER — APPOINTMENT (OUTPATIENT)
Dept: RADIATION ONCOLOGY | Facility: CLINIC | Age: 77
End: 2022-12-12

## 2022-12-19 ENCOUNTER — OFFICE VISIT (OUTPATIENT)
Dept: SURGERY | Facility: CLINIC | Age: 77
End: 2022-12-19

## 2022-12-19 VITALS
BODY MASS INDEX: 22.1 KG/M2 | DIASTOLIC BLOOD PRESSURE: 64 MMHG | WEIGHT: 140.8 LBS | SYSTOLIC BLOOD PRESSURE: 110 MMHG | HEIGHT: 67 IN | TEMPERATURE: 97.2 F | HEART RATE: 60 BPM

## 2022-12-19 DIAGNOSIS — K43.9 EPIGASTRIC HERNIA: Primary | ICD-10-CM

## 2022-12-19 NOTE — PROGRESS NOTES
Assessment/Plan:   Carmelita Cuoch is a 65 y o male who is here for Consider epigastric hernia  CT scan shows a mild diastasis of the rectus abdominal muscles without hernia  Incidentally found multiple splenic lesions all subcentimeter  Six-month follow-up MRI or in his case, CT scan with oral contrast, indicated  Patient is not able to have an MRI secondary to a pacemaker  Pacemaker battery is being changed in 3 months  On exam nonpalpable epigastric hernia but the history is quite consistent  Plan:   1  Probable epigastric hernia based on clinical history  Possibly visualized on CT scan to my view  Primary repair open when he is ready to have this done or when becomes more symptomatic  2  Splenic lesions, multiple, less than 1 cm but up to 9 mm  He had does have a history of prostate cancer and melanoma so we will follow radiology's recommendations with a contrast enhanced CT scan, since he cannot have an MRI, in 6 months and at 12 months  Patient's risk for surgery and anesthesia was reviewed and patient would like to move forward with surgery at this time  Patient understands hernia occurrence or re-occurrence risk is higher in a diabetic, tobacco user, with elevated BMIs    _____________________________________________      HPI:  Carmelita Couch is a 65 y o male who was referred for evaluation of possible epigastric hernia  Currently complaining of  persistent Ventral Hernia  worse with bending, coughing,     A tiny lump comes out of the mid epigastric region, especially at night  It seems to self reduce  It is very palpable and he is a very thin gentleman  Now coming to notice harder to pop the hernia back in     no nausea and no vomiting,     regular bowel movement  Duration of pain or symptoms:  over 3 months and no pain       Prior abdominal surgery:   None in the epigastrium  He has had an inguinal hernia  BMI: Body mass index is 22 05 kg/m²       Smoking Status: Non-smoker     Lab Results   Component Value Date    WBC 4 23 (L) 08/27/2022    HGB 13 8 08/27/2022    HCT 41 4 08/27/2022    MCV 95 08/27/2022     08/27/2022     Lab Results   Component Value Date    K 4 5 08/27/2022     08/27/2022    CO2 26 08/27/2022    BUN 18 08/27/2022    CREATININE 0 94 08/27/2022    GLUF 101 (H) 08/27/2022    CALCIUM 8 3 08/27/2022    CORRECTEDCA 8 8 08/27/2022    AST 24 08/27/2022    ALT 24 08/27/2022    ALKPHOS 59 08/27/2022    EGFR 77 08/27/2022     No results found for: INR, PROTIME        ROS:  General ROS: negative  negative for - chills, fatigue, fever or night sweats, weight loss  Respiratory ROS: no cough, shortness of breath, or wheezing  Cardiovascular ROS: no chest pain or dyspnea on exertion  Genito-Urinary ROS: no dysuria, trouble voiding, or hematuria  Musculoskeletal ROS: negative for - gait disturbance, joint pain or muscle pain  Neurological ROS: no TIA or stroke symptoms  Abdominal ROS: see HPI  GI ROS: see HPI  Skin ROS: no new rashes or lesions   Lymphatic ROS: no new adenopathy noted by pt  GYN ROS: see HPI, no new GYN history or bleeding noted  Psy ROS: no new mental or behavioral disturbances       Patient Active Problem List   Diagnosis   • Prostate cancer ([facility] [address] )   • Non-recurrent unilateral inguinal hernia without obstruction or gangrene   • Leukopenia   • Sick sinus syndrome (HCC)   • Mixed hyperlipidemia   • Pacemaker   • Left inguinal pain   • Epigastric hernia   • Splenic lesion   • Abnormal CT of the abdomen   • Fractured atrial pacemaker lead wire         Allergies: Patient has no known allergies      Meds:  Current Outpatient Medications:   •  atorvastatin (LIPITOR) 20 mg tablet, TAKE 1 TABLET BY MOUTH  DAILY IN THE EARLY MORNING, Disp: 90 tablet, Rfl: 3  •  cholecalciferol (VITAMIN D3) 1,000 units tablet, Take 1,000 Units by mouth daily, Disp: , Rfl:   •  Cyanocobalamin (B-12) 1000 MCG CAPS, Take by mouth daily, Disp: , Rfl:   •  fluticasone (FLONASE) 50 mcg/act nasal spray, 1 spray into each nostril 2 (two) times a day, Disp: 16 g, Rfl: 5  •  ibuprofen (MOTRIN) 200 mg tablet, Take 200 mg by mouth every 6 (six) hours as needed for mild pain, Disp: , Rfl:   •  modafinil (PROVIGIL) 200 MG tablet, Take 200 mg by mouth daily, Disp: , Rfl:     PMH:  Past Medical History:   Diagnosis Date   • Cancer ([facility] )     skin   • Elevated PSA    • Hyperlipidemia    • Narcolepsy    • Pacemaker 1988    Symptomatic Bradycardia   • Prostate cancer ([facility] )        PSH:  Past Surgical History:   Procedure Laterality Date   • ATRIAL CARDIAC PACEMAKER INSERTION     • CARDIAC CATHETERIZATION  02/12/2015   • COLONOSCOPY     • INSERTION OF FIDUCIAL MARKER (TRANSRECTAL ULTRASOUND GUIDANCE) N/A 2/3/2020    Procedure: INSERTION OF FIDUCIAL MARKER (TRANSRECTAL ULTRASOUND GUIDANCE) Huyen Zambrano;  Surgeon: Vadim Spann MD;  [facility];  Service: Urology   • IR UPPER EXTREMITY VENOGRAM- DIAGNOSTIC  11/8/2022   • NJ REPAIR ING HERNIA,5+Y/O,REDUCIBL Left 12/20/2019    Procedure: INGUINAL HERNIA REPAIR;  Surgeon: Bam Moore DO;  Location[facility];  Service: General   • SKIN CANCER EXCISION      left cheek       Family History   Problem Relation Age of Onset   • Coronary artery disease Mother    • Coronary artery disease Father    • Skin cancer Father    • Hyperlipidemia Father    • Cancer Father         bladder cancer   • Coronary artery disease Brother         reports that he has never smoked  He has never used smokeless tobacco  He reports current alcohol use of about 8 0 standard drinks per week  He reports that he does not use drugs      Vitals:    12/19/22 1410   BP: 110/64   Pulse: 60   Temp: (!) 97 2 °F (36 2 °C)       PHYSICAL EXAM  General Appearance:    Alert, cooperative, no distress,    Head:    Normocephalic without obvious abnormality   Eyes:    PERRL, conjunctiva/corneas clear,      Neck:   Supple, no adenopathy, no JVD   Back:     Symmetric, no spinal or CVA tenderness   Lungs:     Clear to auscultation bilaterally, no wheezing or rhonchi   Heart:    Regular rate and rhythm, S1 and S2 normal, no murmur   Abdomen:      abdomen is soft without significant tenderness, masses, organomegaly or guarding Bowel sounds are normal   Not really palpable today on the epigastrium but he does have a mild to modest diastasis recti  However his history is very consistent with an epigastric hernia  Extremities:   Extremities normal  No clubbing, cyanosis or edema   Psych:   Normal Affect, AOx3  Neurologic:  Skin:   CNII-XII intact  Strength symmetric, speech intact    Warm, dry, intact, no visible rashes or lesions                   Informed consent for procedure was personally discussed, reviewed, and signed by Dr Jayesh Worthy  Discussion by Dr Jayesh Worthy was carried out regarding risks, benefits, and alternatives with the patient  Risks include but are not limited to:  bleeding, infection, and delayed wound healing or an open wound, pulmonary embolus, leaks from bowel or bile ducts or other viscus, transfusions, death  Discussed in further detail the more common complications and their rates of occurrence   was used if necessary  Patient expressed understanding of the issues discussed and wished/consented to proceed  All questions were answered by Dr Jayesh Wotrhy  Discussion performed between patient and the provider signing below       Signature:   Sherryle Dimitri, [place]    Date: 12/19/2022 Time: 3:53 PM

## 2022-12-20 ENCOUNTER — IN-CLINIC DEVICE VISIT (OUTPATIENT)
Dept: CARDIOLOGY CLINIC | Facility: CLINIC | Age: 77
End: 2022-12-20

## 2022-12-20 DIAGNOSIS — Z95.0 CARDIAC PACEMAKER IN SITU: Primary | ICD-10-CM

## 2022-12-20 NOTE — PROGRESS NOTES
Results for orders placed or performed in visit on 12/20/22   Cardiac EP device report    Narrative    SJM-DUAL CHAMBER PPM  (DDD MODE) - NOT MRI CONDITIONAL  NB/OZIEL-DEVICE INTERROGATED IN THE [place] OFFICE: BATTERY VOLTAGE NEARING IMER-(0-0 5 YRS)  WILL SCHEDULE MONTHLY BATTERY CHECKS  AP 9 7%  11%  ALL AVAILABLE LEAD PARAMETERS WITHIN NORMAL LIMITS  NO SIGNIFICANT HIGH RATE EPISODES  NORMAL DEVICE FUNCTION   NC

## 2023-01-03 ENCOUNTER — OFFICE VISIT (OUTPATIENT)
Dept: FAMILY MEDICINE CLINIC | Facility: CLINIC | Age: 78
End: 2023-01-03

## 2023-01-03 VITALS
DIASTOLIC BLOOD PRESSURE: 60 MMHG | OXYGEN SATURATION: 96 % | HEART RATE: 62 BPM | SYSTOLIC BLOOD PRESSURE: 112 MMHG | HEIGHT: 68 IN | WEIGHT: 139.8 LBS | RESPIRATION RATE: 16 BRPM | BODY MASS INDEX: 21.19 KG/M2 | TEMPERATURE: 97.7 F

## 2023-01-03 DIAGNOSIS — J40 BRONCHITIS: Primary | ICD-10-CM

## 2023-01-03 RX ORDER — AZITHROMYCIN 250 MG/1
TABLET, FILM COATED ORAL
Qty: 6 TABLET | Refills: 0 | Status: SHIPPED | OUTPATIENT
Start: 2023-01-03 | End: 2023-01-08

## 2023-01-03 RX ORDER — GUAIFENESIN AND CODEINE PHOSPHATE 100; 10 MG/5ML; MG/5ML
SOLUTION ORAL
Qty: 120 ML | Refills: 1 | Status: SHIPPED | OUTPATIENT
Start: 2023-01-03

## 2023-01-03 NOTE — PROGRESS NOTES
Assessment/Plan:    No problem-specific Assessment & Plan notes found for this encounter  There are no diagnoses linked to this encounter  Subjective:      Patient ID: Memo Baxter is a 65 y o  male  Cough/ fever/patient was seen at urgent care several days ago with negative flu/COVID testing  No other sick family members at this time  Symptoms include chills episodic fevers low energy productive cough all of which is persisting for the 8 or 9 days  The following portions of the patient's history were reviewed and updated as appropriate: allergies, current medications, past family history, past medical history, past social history, past surgical history and problem list     Review of Systems   Constitutional: Negative for chills and fever  HENT: Negative for congestion, ear pain, rhinorrhea, sinus pain and sore throat  Eyes: Negative for visual disturbance  Respiratory: Positive for cough  Negative for shortness of breath and wheezing  Cardiovascular: Negative for chest pain  Gastrointestinal: Negative for constipation and diarrhea  Skin: Negative for rash  Objective:  Vitals:    01/03/23 1124   BP: 112/60   BP Location: Left arm   Patient Position: Sitting   Cuff Size: Standard   Pulse: 62   Resp: 16   Temp: 97 7 °F (36 5 °C)   TempSrc: Temporal   SpO2: 96%   Weight: 63 4 kg (139 lb 12 8 oz)   Height: 5' 8" (1 727 m)      Physical Exam  Constitutional:       Appearance: He is well-developed  He is not diaphoretic  HENT:      Head: Normocephalic and atraumatic  Eyes:      Conjunctiva/sclera: Conjunctivae normal    Cardiovascular:      Rate and Rhythm: Normal rate and regular rhythm  Pulmonary:      Effort: Pulmonary effort is normal       Breath sounds: Normal breath sounds  Skin:     General: Skin is warm and dry  Neurological:      Mental Status: He is alert and oriented to person, place, and time             Symptoms and exam are consistent with a prolonged bronchitis  Antibiotics are indicated

## 2023-01-10 ENCOUNTER — APPOINTMENT (OUTPATIENT)
Dept: LAB | Facility: HOSPITAL | Age: 78
End: 2023-01-10

## 2023-01-10 ENCOUNTER — TREATMENT (OUTPATIENT)
Dept: FAMILY MEDICINE CLINIC | Facility: CLINIC | Age: 78
End: 2023-01-10

## 2023-01-10 ENCOUNTER — TRANSCRIBE ORDERS (OUTPATIENT)
Dept: FAMILY MEDICINE CLINIC | Facility: CLINIC | Age: 78
End: 2023-01-10

## 2023-01-10 DIAGNOSIS — R61 NIGHT SWEATS: Primary | ICD-10-CM

## 2023-01-10 DIAGNOSIS — R61 NIGHT SWEATS: ICD-10-CM

## 2023-01-10 LAB
ALBUMIN SERPL BCP-MCNC: 2.9 G/DL (ref 3.5–5)
ALP SERPL-CCNC: 78 U/L (ref 46–116)
ALT SERPL W P-5'-P-CCNC: 52 U/L (ref 12–78)
ANION GAP SERPL CALCULATED.3IONS-SCNC: 9 MMOL/L (ref 4–13)
AST SERPL W P-5'-P-CCNC: 37 U/L (ref 5–45)
BASOPHILS # BLD AUTO: 0.04 THOUSANDS/ÂΜL (ref 0–0.1)
BASOPHILS NFR BLD AUTO: 0 % (ref 0–1)
BILIRUB SERPL-MCNC: 0.25 MG/DL (ref 0.2–1)
BILIRUB UR QL STRIP: NEGATIVE
BUN SERPL-MCNC: 15 MG/DL (ref 5–25)
CALCIUM ALBUM COR SERPL-MCNC: 9.8 MG/DL (ref 8.3–10.1)
CALCIUM SERPL-MCNC: 8.9 MG/DL (ref 8.3–10.1)
CHLORIDE SERPL-SCNC: 100 MMOL/L (ref 96–108)
CLARITY UR: CLEAR
CO2 SERPL-SCNC: 27 MMOL/L (ref 21–32)
COLOR UR: YELLOW
CREAT SERPL-MCNC: 0.88 MG/DL (ref 0.6–1.3)
EOSINOPHIL # BLD AUTO: 0.05 THOUSAND/ÂΜL (ref 0–0.61)
EOSINOPHIL NFR BLD AUTO: 1 % (ref 0–6)
ERYTHROCYTE [DISTWIDTH] IN BLOOD BY AUTOMATED COUNT: 12.7 % (ref 11.6–15.1)
GFR SERPL CREATININE-BSD FRML MDRD: 82 ML/MIN/1.73SQ M
GLUCOSE SERPL-MCNC: 90 MG/DL (ref 65–140)
GLUCOSE UR STRIP-MCNC: NEGATIVE MG/DL
HCT VFR BLD AUTO: 38.1 % (ref 36.5–49.3)
HGB BLD-MCNC: 12.6 G/DL (ref 12–17)
HGB UR QL STRIP.AUTO: NEGATIVE
IMM GRANULOCYTES # BLD AUTO: 0.16 THOUSAND/UL (ref 0–0.2)
IMM GRANULOCYTES NFR BLD AUTO: 2 % (ref 0–2)
KETONES UR STRIP-MCNC: NEGATIVE MG/DL
LEUKOCYTE ESTERASE UR QL STRIP: NEGATIVE
LYMPHOCYTES # BLD AUTO: 1.13 THOUSANDS/ÂΜL (ref 0.6–4.47)
LYMPHOCYTES NFR BLD AUTO: 11 % (ref 14–44)
MCH RBC QN AUTO: 31.3 PG (ref 26.8–34.3)
MCHC RBC AUTO-ENTMCNC: 33.1 G/DL (ref 31.4–37.4)
MCV RBC AUTO: 95 FL (ref 82–98)
MONOCYTES # BLD AUTO: 0.8 THOUSAND/ÂΜL (ref 0.17–1.22)
MONOCYTES NFR BLD AUTO: 8 % (ref 4–12)
NEUTROPHILS # BLD AUTO: 8.18 THOUSANDS/ÂΜL (ref 1.85–7.62)
NEUTS SEG NFR BLD AUTO: 78 % (ref 43–75)
NITRITE UR QL STRIP: NEGATIVE
NRBC BLD AUTO-RTO: 0 /100 WBCS
PH UR STRIP.AUTO: 6 [PH]
PLATELET # BLD AUTO: 415 THOUSANDS/UL (ref 149–390)
PMV BLD AUTO: 9.4 FL (ref 8.9–12.7)
POTASSIUM SERPL-SCNC: 4.6 MMOL/L (ref 3.5–5.3)
PROT SERPL-MCNC: 7 G/DL (ref 6.4–8.4)
PROT UR STRIP-MCNC: NEGATIVE MG/DL
RBC # BLD AUTO: 4.03 MILLION/UL (ref 3.88–5.62)
SODIUM SERPL-SCNC: 136 MMOL/L (ref 135–147)
SP GR UR STRIP.AUTO: 1.02 (ref 1–1.03)
UROBILINOGEN UR QL STRIP.AUTO: 0.2 E.U./DL
WBC # BLD AUTO: 10.36 THOUSAND/UL (ref 4.31–10.16)

## 2023-01-12 NOTE — PRE-PROCEDURE INSTRUCTIONS
Pre-Surgery Instructions:   Medication Instructions   • atorvastatin (LIPITOR) 20 mg tablet Take day of surgery  • cholecalciferol (VITAMIN D3) 1,000 units tablet Stop taking 7 days prior to surgery  • Cyanocobalamin (B-12) 1000 MCG CAPS Stop taking 7 days prior to surgery  • fluticasone (FLONASE) 50 mcg/act nasal spray Uses PRN- OK to take day of surgery   • ibuprofen (MOTRIN) 200 mg tablet Stop taking 3 days prior to surgery  • modafinil (PROVIGIL) 200 MG tablet Hold 24 hours before sx    All pre-op instructions reviewed as per [facility] My Surgery Experience w/ pt verb understanding  Inst NPO post MN night before sx except sips water w/ meds morning of sx  Instructed to avoid all ASA/NSAIDs and OTC Vit/Supp from now until after surgery per anesthesia guidelines  Tylenol ok prn  Received pre-op showering instructions and CHG from surgeon office, reviewed process at time of call  Current hospital visitor & masking policy reviewed  [facility] will receive phone call w/ time to report on DOS btwn 2-8 pm afternoon/evening before surgery from hospital nursing staff  Pt verbalized an understanding of all instructions reviewed and offers no concerns at this time

## 2023-01-16 ENCOUNTER — ANESTHESIA EVENT (OUTPATIENT)
Dept: PERIOP | Facility: HOSPITAL | Age: 78
End: 2023-01-16

## 2023-01-18 ENCOUNTER — OFFICE VISIT (OUTPATIENT)
Dept: SURGERY | Facility: CLINIC | Age: 78
End: 2023-01-18

## 2023-01-18 VITALS
SYSTOLIC BLOOD PRESSURE: 120 MMHG | WEIGHT: 137.2 LBS | TEMPERATURE: 96.7 F | HEART RATE: 64 BPM | BODY MASS INDEX: 20.79 KG/M2 | HEIGHT: 68 IN | DIASTOLIC BLOOD PRESSURE: 70 MMHG

## 2023-01-18 DIAGNOSIS — K43.9 EPIGASTRIC HERNIA: Primary | ICD-10-CM

## 2023-01-18 NOTE — PROGRESS NOTES
Assessment/Plan:   Juliano Pedersen is a 65 y o male who is here for Consider epigastric hernia H&P     CT scan shows a mild diastasis of the rectus abdominal muscles without hernia  Incidentally found multiple splenic lesions all subcentimeter  Six-month follow-up MRI or in his case, CT scan with oral contrast, indicated  Patient is not able to have an MRI secondary to a pacemaker  Pacemaker battery is being changed in 3 months  On exam nonpalpable epigastric hernia but the history is quite consistent  Plan:   1  Probable epigastric hernia based on clinical history  Possibly visualized on CT scan to my view  Primary repair open now that it is symptomatic  2  Splenic lesions, multiple, less than 1 cm but up to 9 mm  He had does have a history of prostate cancer and melanoma so we will follow radiology's recommendations with a contrast enhanced CT scan, since he cannot have an MRI, in 6 months and at 12 months  Due in march will order CT scan at post-op visit  Patient's risk for surgery and anesthesia was reviewed and patient would like to move forward with surgery at this time  Patient understands hernia occurrence or re-occurrence risk is higher in a diabetic, tobacco user, with elevated BMIs    _____________________________________________      HPI:  Juliano Pedersen is a 65 y o male who was referred for evaluation of possible epigastric hernia  Currently complaining of  persistent Ventral Hernia  worse with bending, coughing,     A tiny lump comes out of the mid epigastric region, especially at night  It seems to self reduce  It is very palpable and he is a very thin gentleman  Now coming to notice harder to pop the hernia back in     no nausea and no vomiting,     regular bowel movement  Duration of pain or symptoms:  over 3 months and no pain       Prior abdominal surgery:   None in the epigastrium  He has had an inguinal hernia  BMI: Body mass index is 20 86 kg/m²       Smoking Status: Non-smoker     Lab Results   Component Value Date    WBC 10 36 (H) 01/10/2023    HGB 12 6 01/10/2023    HCT 38 1 01/10/2023    MCV 95 01/10/2023     (H) 01/10/2023     Lab Results   Component Value Date    K 4 6 01/10/2023     01/10/2023    CO2 27 01/10/2023    BUN 15 01/10/2023    CREATININE 0 88 01/10/2023    GLUF 101 (H) 08/27/2022    CALCIUM 8 9 01/10/2023    CORRECTEDCA 9 8 01/10/2023    AST 37 01/10/2023    ALT 52 01/10/2023    ALKPHOS 78 01/10/2023    EGFR 82 01/10/2023     No results found for: INR, PROTIME        ROS:  General ROS: negative  negative for - chills, fatigue, fever or night sweats, weight loss  Respiratory ROS: no cough, shortness of breath, or wheezing  Cardiovascular ROS: no chest pain or dyspnea on exertion  Genito-Urinary ROS: no dysuria, trouble voiding, or hematuria  Musculoskeletal ROS: negative for - gait disturbance, joint pain or muscle pain  Neurological ROS: no TIA or stroke symptoms  Abdominal ROS: see HPI  GI ROS: see HPI  Skin ROS: no new rashes or lesions   Lymphatic ROS: no new adenopathy noted by pt  GYN ROS: see HPI, no new GYN history or bleeding noted  Psy ROS: no new mental or behavioral disturbances       Patient Active Problem List   Diagnosis   • Prostate cancer ([facility] [address] )   • Non-recurrent unilateral inguinal hernia without obstruction or gangrene   • Leukopenia   • Sick sinus syndrome (HCC)   • Mixed hyperlipidemia   • Pacemaker   • Left inguinal pain   • Epigastric hernia   • Splenic lesion   • Abnormal CT of the abdomen   • Fractured atrial pacemaker lead wire   • Bronchitis         Allergies: Patient has no known allergies      Meds:  Current Outpatient Medications:   •  atorvastatin (LIPITOR) 20 mg tablet, TAKE 1 TABLET BY MOUTH  DAILY IN THE EARLY MORNING, Disp: 90 tablet, Rfl: 3  •  cholecalciferol (VITAMIN D3) 1,000 units tablet, Take 1,000 Units by mouth daily, Disp: , Rfl:   •  Cyanocobalamin (B-12) 1000 MCG CAPS, Take by mouth daily, Disp: , Rfl:   •  fluticasone (FLONASE) 50 mcg/act nasal spray, SPRAY 1 SPRAY INTO EACH NOSTRIL TWICE A DAY, Disp: 16 mL, Rfl: 5  •  ibuprofen (MOTRIN) 200 mg tablet, Take 200 mg by mouth every 6 (six) hours as needed for mild pain, Disp: , Rfl:   •  modafinil (PROVIGIL) 200 MG tablet, Take 200 mg by mouth daily, Disp: , Rfl:   •  guaifenesin-codeine (GUAIFENESIN AC) 100-10 MG/5ML liquid, 1-2 TSP Q6HR PRN COUGH (Patient not taking: Reported on 1/18/2023), Disp: 120 mL, Rfl: 1    PMH:  Past Medical History:   Diagnosis Date   • [facility] ([email or web address]     skin - L cheek carcinoma   • Elevated PSA    • Hyperlipidemia    • Melanoma ([facility] [address] )     L cheek, surgically removed   • Narcolepsy    • Pacemaker 1988    Symptomatic Bradycardia   • Prostate cancer ([facility] [address] )    • Snoring        PSH:  Past Surgical History:   Procedure Laterality Date   • ATRIAL CARDIAC PACEMAKER INSERTION     • CARDIAC CATHETERIZATION  02/12/2015   • COLONOSCOPY     • INSERTION OF FIDUCIAL MARKER (TRANSRECTAL ULTRASOUND GUIDANCE) N/A 2/3/2020    Procedure: INSERTION OF FIDUCIAL MARKER (TRANSRECTAL ULTRASOUND GUIDANCE) Amanda Looney;  Surgeon: Kendal Mari MD;  Location[facility];  Service: Urology   • IR UPPER EXTREMITY VENOGRAM- DIAGNOSTIC  11/8/2022   • WI RPR 1ST INGUN HRNA AGE 5 YRS/> REDUCIBLE Left 12/20/2019    Procedure: INGUINAL HERNIA REPAIR;  Surgeon: Obey Magana DO;  Location: [facility];  Service: General   • SKIN CANCER EXCISION      left cheek       Family History   Problem Relation Age of Onset   • Coronary artery disease Mother    • Coronary artery disease Father    • Skin cancer Father    • Hyperlipidemia Father    • Cancer Father         bladder cancer   • Coronary artery disease Brother         reports that he has never smoked  He has never used smokeless tobacco  He reports current alcohol use of about 8 0 standard drinks per week  He reports that he does not use drugs      Vitals:    01/18/23 0823   BP: 120/70   Pulse: 64   Temp: (!) 96 7 °F (35 9 °C)       PHYSICAL EXAM  General Appearance:    Alert, cooperative, no distress,    Head:    Normocephalic without obvious abnormality   Eyes:    PERRL, conjunctiva/corneas clear,      Neck:   Supple, no adenopathy, no JVD   Back:     Symmetric, no spinal or CVA tenderness   Lungs:     Clear to auscultation bilaterally, no wheezing or rhonchi   Heart:    Regular rate and rhythm, S1 and S2 normal, no murmur   Abdomen:      abdomen is soft without significant tenderness, masses, organomegaly or guarding Bowel sounds are normal   Not really palpable today on the epigastrium but he does have a mild to modest diastasis recti  However his history is very consistent with an epigastric hernia  Extremities:   Extremities normal  No clubbing, cyanosis or edema   Psych:   Normal Affect, AOx3  Neurologic:  Skin:   CNII-XII intact  Strength symmetric, speech intact    Warm, dry, intact, no visible rashes or lesions                   Informed consent for procedure was personally discussed, reviewed, and signed by Dr Luisa Lane  Discussion by Dr Luisa Lane was carried out regarding risks, benefits, and alternatives with the patient  Risks include but are not limited to:  bleeding, infection, and delayed wound healing or an open wound, pulmonary embolus, leaks from bowel or bile ducts or other viscus, transfusions, death  Discussed in further detail the more common complications and their rates of occurrence   was used if necessary  Patient expressed understanding of the issues discussed and wished/consented to proceed  All questions were answered by Dr Luisa Lane  Discussion performed between patient and the provider signing below       Signature:   Garth Jean    Date: 1/18/2023 Time: 8:59 AM

## 2023-01-18 NOTE — H&P (VIEW-ONLY)
Assessment/Plan:   Haylie Ham is a 65 y o male who is here for Consider epigastric hernia H&P     CT scan shows a mild diastasis of the rectus abdominal muscles without hernia  Incidentally found multiple splenic lesions all subcentimeter  Six-month follow-up MRI or in his case, CT scan with oral contrast, indicated  Patient is not able to have an MRI secondary to a pacemaker  Pacemaker battery is being changed in 3 months  On exam nonpalpable epigastric hernia but the history is quite consistent  Plan:   1  Probable epigastric hernia based on clinical history  Possibly visualized on CT scan to my view  Primary repair open now that it is symptomatic  2  Splenic lesions, multiple, less than 1 cm but up to 9 mm  He had does have a history of prostate cancer and melanoma so we will follow radiology's recommendations with a contrast enhanced CT scan, since he cannot have an MRI, in 6 months and at 12 months  Due in march will order CT scan at post-op visit  Patient's risk for surgery and anesthesia was reviewed and patient would like to move forward with surgery at this time  Patient understands hernia occurrence or re-occurrence risk is higher in a diabetic, tobacco user, with elevated BMIs    _____________________________________________      HPI:  Haylie Ham is a 65 y o male who was referred for evaluation of possible epigastric hernia  Currently complaining of  persistent Ventral Hernia  worse with bending, coughing,     A tiny lump comes out of the mid epigastric region, especially at night  It seems to self reduce  It is very palpable and he is a very thin gentleman  Now coming to notice harder to pop the hernia back in     no nausea and no vomiting,     regular bowel movement  Duration of pain or symptoms:  over 3 months and no pain       Prior abdominal surgery:   None in the epigastrium  He has had an inguinal hernia  BMI: Body mass index is 20 86 kg/m²       Smoking Status: Non-smoker     Lab Results   Component Value Date    WBC 10 36 (H) 01/10/2023    HGB 12 6 01/10/2023    HCT 38 1 01/10/2023    MCV 95 01/10/2023     (H) 01/10/2023     Lab Results   Component Value Date    K 4 6 01/10/2023     01/10/2023    CO2 27 01/10/2023    BUN 15 01/10/2023    CREATININE 0 88 01/10/2023    GLUF 101 (H) 08/27/2022    CALCIUM 8 9 01/10/2023    CORRECTEDCA 9 8 01/10/2023    AST 37 01/10/2023    ALT 52 01/10/2023    ALKPHOS 78 01/10/2023    EGFR 82 01/10/2023     No results found for: INR, PROTIME        ROS:  General ROS: negative  negative for - chills, fatigue, fever or night sweats, weight loss  Respiratory ROS: no cough, shortness of breath, or wheezing  Cardiovascular ROS: no chest pain or dyspnea on exertion  Genito-Urinary ROS: no dysuria, trouble voiding, or hematuria  Musculoskeletal ROS: negative for - gait disturbance, joint pain or muscle pain  Neurological ROS: no TIA or stroke symptoms  Abdominal ROS: see HPI  GI ROS: see HPI  Skin ROS: no new rashes or lesions   Lymphatic ROS: no new adenopathy noted by pt  GYN ROS: see HPI, no new GYN history or bleeding noted  Psy ROS: no new mental or behavioral disturbances       Patient Active Problem List   Diagnosis   • Prostate cancer ([facility] [address] )   • Non-recurrent unilateral inguinal hernia without obstruction or gangrene   • Leukopenia   • Sick sinus syndrome (HCC)   • Mixed hyperlipidemia   • Pacemaker   • Left inguinal pain   • Epigastric hernia   • Splenic lesion   • Abnormal CT of the abdomen   • Fractured atrial pacemaker lead wire   • Bronchitis         Allergies: Patient has no known allergies      Meds:  Current Outpatient Medications:   •  atorvastatin (LIPITOR) 20 mg tablet, TAKE 1 TABLET BY MOUTH  DAILY IN THE EARLY MORNING, Disp: 90 tablet, Rfl: 3  •  cholecalciferol (VITAMIN D3) 1,000 units tablet, Take 1,000 Units by mouth daily, Disp: , Rfl:   •  Cyanocobalamin (B-12) 1000 MCG CAPS, Take by mouth daily, Disp: , Rfl:   •  fluticasone (FLONASE) 50 mcg/act nasal spray, SPRAY 1 SPRAY INTO EACH NOSTRIL TWICE A DAY, Disp: 16 mL, Rfl: 5  •  ibuprofen (MOTRIN) 200 mg tablet, Take 200 mg by mouth every 6 (six) hours as needed for mild pain, Disp: , Rfl:   •  modafinil (PROVIGIL) 200 MG tablet, Take 200 mg by mouth daily, Disp: , Rfl:   •  guaifenesin-codeine (GUAIFENESIN AC) 100-10 MG/5ML liquid, 1-2 TSP Q6HR PRN COUGH (Patient not taking: Reported on 1/18/2023), Disp: 120 mL, Rfl: 1    PMH:  Past Medical History:   Diagnosis Date   • Cancer ([facility] [address] )     skin - L cheek carcinoma   • Elevated PSA    • Hyperlipidemia    • Melanoma ([facility] [address] )     L cheek, surgically removed   • Narcolepsy    • Pacemaker 1988    Symptomatic Bradycardia   • Prostate cancer ([facility] [address] )    • Snoring        PSH:  Past Surgical History:   Procedure Laterality Date   • ATRIAL CARDIAC PACEMAKER INSERTION     • CARDIAC CATHETERIZATION  02/12/2015   • COLONOSCOPY     • INSERTION OF FIDUCIAL MARKER (TRANSRECTAL ULTRASOUND GUIDANCE) N/A 2/3/2020    Procedure: INSERTION OF FIDUCIAL MARKER (TRANSRECTAL ULTRASOUND GUIDANCE) Yunior Diaz;  Surgeon: Elisa Escamilla MD;  Location[facility];  Service: Urology   • IR UPPER EXTREMITY VENOGRAM- DIAGNOSTIC  11/8/2022   • NH RPR 1ST INGUN HRNA AGE 5 YRS/> REDUCIBLE Left 12/20/2019    Procedure: INGUINAL HERNIA REPAIR;  Surgeon: Ronna Aguilera DO;  Location[facility];  Service: General   • SKIN CANCER EXCISION      left cheek       Family History   Problem Relation Age of Onset   • Coronary artery disease Mother    • Coronary artery disease Father    • Skin cancer Father    • Hyperlipidemia Father    • Cancer Father         bladder cancer   • Coronary artery disease Brother         reports that he has never smoked  He has never used smokeless tobacco  He reports current alcohol use of about 8 0 standard drinks per week  He reports that he does not use drugs      Vitals:    01/18/23 0823   BP: 120/70   Pulse: 64   Temp: (!) 96 7 °F (35 9 °C)       PHYSICAL EXAM  General Appearance:    Alert, cooperative, no distress,    Head:    Normocephalic without obvious abnormality   Eyes:    PERRL, conjunctiva/corneas clear,      Neck:   Supple, no adenopathy, no JVD   Back:     Symmetric, no spinal or CVA tenderness   Lungs:     Clear to auscultation bilaterally, no wheezing or rhonchi   Heart:    Regular rate and rhythm, S1 and S2 normal, no murmur   Abdomen:      abdomen is soft without significant tenderness, masses, organomegaly or guarding Bowel sounds are normal   Not really palpable today on the epigastrium but he does have a mild to modest diastasis recti  However his history is very consistent with an epigastric hernia  Extremities:   Extremities normal  No clubbing, cyanosis or edema   Psych:   Normal Affect, AOx3  Neurologic:  Skin:   CNII-XII intact  Strength symmetric, speech intact    Warm, dry, intact, no visible rashes or lesions                   Informed consent for procedure was personally discussed, reviewed, and signed by Dr Luisa Lane  Discussion by Dr Luisa Lane was carried out regarding risks, benefits, and alternatives with the patient  Risks include but are not limited to:  bleeding, infection, and delayed wound healing or an open wound, pulmonary embolus, leaks from bowel or bile ducts or other viscus, transfusions, death  Discussed in further detail the more common complications and their rates of occurrence   was used if necessary  Patient expressed understanding of the issues discussed and wished/consented to proceed  All questions were answered by Dr Luisa Lane  Discussion performed between patient and the provider signing below       Signature:   Garth Jean    Date: 1/18/2023 Time: 8:59 AM

## 2023-01-19 ENCOUNTER — PREP FOR PROCEDURE (OUTPATIENT)
Dept: SURGERY | Facility: CLINIC | Age: 78
End: 2023-01-19

## 2023-01-19 ENCOUNTER — ANESTHESIA (OUTPATIENT)
Dept: PERIOP | Facility: HOSPITAL | Age: 78
End: 2023-01-19

## 2023-01-19 ENCOUNTER — HOSPITAL ENCOUNTER (OUTPATIENT)
Facility: HOSPITAL | Age: 78
Setting detail: OUTPATIENT SURGERY
Discharge: HOME | End: 2023-01-19
Attending: SURGERY | Admitting: SURGERY

## 2023-01-19 VITALS
HEIGHT: 68 IN | WEIGHT: 135.58 LBS | RESPIRATION RATE: 16 BRPM | OXYGEN SATURATION: 98 % | SYSTOLIC BLOOD PRESSURE: 147 MMHG | TEMPERATURE: 98.4 F | DIASTOLIC BLOOD PRESSURE: 62 MMHG | HEART RATE: 59 BPM | BODY MASS INDEX: 20.55 KG/M2

## 2023-01-19 DIAGNOSIS — K43.9 EPIGASTRIC HERNIA: Primary | ICD-10-CM

## 2023-01-19 RX ORDER — HYDROMORPHONE HCL/PF 1 MG/ML
0.5 SYRINGE (ML) INJECTION
Status: DISCONTINUED | OUTPATIENT
Start: 2023-01-19 | End: 2023-01-19 | Stop reason: HOSPADM

## 2023-01-19 RX ORDER — FENTANYL CITRATE/PF 50 MCG/ML
50 SYRINGE (ML) INJECTION
Status: DISCONTINUED | OUTPATIENT
Start: 2023-01-19 | End: 2023-01-19 | Stop reason: HOSPADM

## 2023-01-19 RX ORDER — DEXTROSE AND SODIUM CHLORIDE 5; .45 G/100ML; G/100ML
80 INJECTION, SOLUTION INTRAVENOUS CONTINUOUS
Status: DISCONTINUED | OUTPATIENT
Start: 2023-01-19 | End: 2023-01-19 | Stop reason: HOSPADM

## 2023-01-19 RX ORDER — ACETAMINOPHEN 325 MG/1
650 TABLET ORAL EVERY 6 HOURS PRN
Status: DISCONTINUED | OUTPATIENT
Start: 2023-01-19 | End: 2023-01-19 | Stop reason: HOSPADM

## 2023-01-19 RX ORDER — HYDROCODONE BITARTRATE AND ACETAMINOPHEN 5; 325 MG/1; MG/1
1 TABLET ORAL EVERY 6 HOURS PRN
Qty: 5 TABLET | Refills: 0 | Status: SHIPPED | OUTPATIENT
Start: 2023-01-19

## 2023-01-19 RX ORDER — DEXAMETHASONE SODIUM PHOSPHATE 10 MG/ML
INJECTION, SOLUTION INTRAMUSCULAR; INTRAVENOUS AS NEEDED
Status: DISCONTINUED | OUTPATIENT
Start: 2023-01-19 | End: 2023-01-19

## 2023-01-19 RX ORDER — CEFAZOLIN SODIUM 2 G/50ML
SOLUTION INTRAVENOUS AS NEEDED
Status: DISCONTINUED | OUTPATIENT
Start: 2023-01-19 | End: 2023-01-19

## 2023-01-19 RX ORDER — ONDANSETRON 2 MG/ML
INJECTION INTRAMUSCULAR; INTRAVENOUS AS NEEDED
Status: DISCONTINUED | OUTPATIENT
Start: 2023-01-19 | End: 2023-01-19

## 2023-01-19 RX ORDER — FENTANYL CITRATE 50 UG/ML
INJECTION, SOLUTION INTRAMUSCULAR; INTRAVENOUS AS NEEDED
Status: DISCONTINUED | OUTPATIENT
Start: 2023-01-19 | End: 2023-01-19

## 2023-01-19 RX ORDER — EPHEDRINE SULFATE 50 MG/ML
INJECTION INTRAVENOUS AS NEEDED
Status: DISCONTINUED | OUTPATIENT
Start: 2023-01-19 | End: 2023-01-19

## 2023-01-19 RX ORDER — NEOSTIGMINE METHYLSULFATE 1 MG/ML
INJECTION INTRAVENOUS AS NEEDED
Status: DISCONTINUED | OUTPATIENT
Start: 2023-01-19 | End: 2023-01-19

## 2023-01-19 RX ORDER — LIDOCAINE HYDROCHLORIDE 10 MG/ML
INJECTION, SOLUTION EPIDURAL; INFILTRATION; INTRACAUDAL; PERINEURAL AS NEEDED
Status: DISCONTINUED | OUTPATIENT
Start: 2023-01-19 | End: 2023-01-19

## 2023-01-19 RX ORDER — ROCURONIUM BROMIDE 10 MG/ML
INJECTION, SOLUTION INTRAVENOUS AS NEEDED
Status: DISCONTINUED | OUTPATIENT
Start: 2023-01-19 | End: 2023-01-19

## 2023-01-19 RX ORDER — CEFAZOLIN SODIUM 1 G/50ML
1000 SOLUTION INTRAVENOUS ONCE
Status: DISCONTINUED | OUTPATIENT
Start: 2023-01-19 | End: 2023-01-19 | Stop reason: HOSPADM

## 2023-01-19 RX ORDER — ONDANSETRON 2 MG/ML
4 INJECTION INTRAMUSCULAR; INTRAVENOUS ONCE AS NEEDED
Status: DISCONTINUED | OUTPATIENT
Start: 2023-01-19 | End: 2023-01-19 | Stop reason: HOSPADM

## 2023-01-19 RX ORDER — PROPOFOL 10 MG/ML
INJECTION, EMULSION INTRAVENOUS AS NEEDED
Status: DISCONTINUED | OUTPATIENT
Start: 2023-01-19 | End: 2023-01-19

## 2023-01-19 RX ORDER — SODIUM CHLORIDE 9 MG/ML
125 INJECTION, SOLUTION INTRAVENOUS CONTINUOUS
Status: DISCONTINUED | OUTPATIENT
Start: 2023-01-19 | End: 2023-01-19 | Stop reason: HOSPADM

## 2023-01-19 RX ORDER — GLYCOPYRROLATE 0.2 MG/ML
INJECTION INTRAMUSCULAR; INTRAVENOUS AS NEEDED
Status: DISCONTINUED | OUTPATIENT
Start: 2023-01-19 | End: 2023-01-19

## 2023-01-19 RX ORDER — HYDROCODONE BITARTRATE AND ACETAMINOPHEN 5; 325 MG/1; MG/1
1 TABLET ORAL EVERY 6 HOURS PRN
Status: DISCONTINUED | OUTPATIENT
Start: 2023-01-19 | End: 2023-01-19 | Stop reason: HOSPADM

## 2023-01-19 RX ORDER — ONDANSETRON 2 MG/ML
4 INJECTION INTRAMUSCULAR; INTRAVENOUS EVERY 8 HOURS PRN
Status: DISCONTINUED | OUTPATIENT
Start: 2023-01-19 | End: 2023-01-19 | Stop reason: HOSPADM

## 2023-01-19 RX ORDER — DIPHENHYDRAMINE HYDROCHLORIDE 50 MG/ML
25 INJECTION INTRAMUSCULAR; INTRAVENOUS ONCE AS NEEDED
Status: COMPLETED | OUTPATIENT
Start: 2023-01-19 | End: 2023-01-19

## 2023-01-19 RX ORDER — ONDANSETRON 4 MG/1
4 TABLET, ORALLY DISINTEGRATING ORAL EVERY 8 HOURS PRN
Status: DISCONTINUED | OUTPATIENT
Start: 2023-01-19 | End: 2023-01-19 | Stop reason: HOSPADM

## 2023-01-19 RX ADMIN — SODIUM CHLORIDE: 0.9 INJECTION, SOLUTION INTRAVENOUS at 09:33

## 2023-01-19 RX ADMIN — FENTANYL CITRATE 100 MCG: 50 INJECTION INTRAMUSCULAR; INTRAVENOUS at 08:33

## 2023-01-19 RX ADMIN — PROPOFOL 150 MG: 10 INJECTION, EMULSION INTRAVENOUS at 08:33

## 2023-01-19 RX ADMIN — ONDANSETRON 4 MG: 2 INJECTION INTRAMUSCULAR; INTRAVENOUS at 09:19

## 2023-01-19 RX ADMIN — HYDROCODONE BITARTRATE AND ACETAMINOPHEN 1 TABLET: 5; 325 TABLET ORAL at 10:49

## 2023-01-19 RX ADMIN — DEXAMETHASONE SODIUM PHOSPHATE 4 MG: 10 INJECTION INTRAMUSCULAR; INTRAVENOUS at 08:56

## 2023-01-19 RX ADMIN — LIDOCAINE HYDROCHLORIDE 60 MG: 10 INJECTION, SOLUTION EPIDURAL; INFILTRATION; INTRACAUDAL; PERINEURAL at 08:33

## 2023-01-19 RX ADMIN — SODIUM CHLORIDE 125 ML/HR: 0.9 INJECTION, SOLUTION INTRAVENOUS at 07:15

## 2023-01-19 RX ADMIN — ROCURONIUM BROMIDE 50 MG: 10 INJECTION, SOLUTION INTRAVENOUS at 08:33

## 2023-01-19 RX ADMIN — DIPHENHYDRAMINE HYDROCHLORIDE 25 MG: 50 INJECTION, SOLUTION INTRAMUSCULAR; INTRAVENOUS at 07:12

## 2023-01-19 RX ADMIN — FENTANYL CITRATE 50 MCG: 50 INJECTION, SOLUTION INTRAMUSCULAR; INTRAVENOUS at 10:02

## 2023-01-19 RX ADMIN — CEFAZOLIN SODIUM 2000 MG: 2 SOLUTION INTRAVENOUS at 08:43

## 2023-01-19 RX ADMIN — GLYCOPYRROLATE 0.4 MG: 0.2 INJECTION INTRAMUSCULAR; INTRAVENOUS at 09:28

## 2023-01-19 RX ADMIN — EPHEDRINE SULFATE 5 MG: 50 INJECTION, SOLUTION INTRAVENOUS at 08:49

## 2023-01-19 RX ADMIN — FENTANYL CITRATE 25 MCG: 50 INJECTION INTRAMUSCULAR; INTRAVENOUS at 09:18

## 2023-01-19 RX ADMIN — SODIUM CHLORIDE: 0.9 INJECTION, SOLUTION INTRAVENOUS at 09:21

## 2023-01-19 RX ADMIN — NEOSTIGMINE METHYLSULFATE 3 MG: 1 INJECTION INTRAVENOUS at 09:28

## 2023-01-19 RX ADMIN — EPHEDRINE SULFATE 5 MG: 50 INJECTION, SOLUTION INTRAVENOUS at 08:51

## 2023-01-19 RX ADMIN — EPHEDRINE SULFATE 10 MG: 50 INJECTION, SOLUTION INTRAVENOUS at 08:37

## 2023-01-19 RX ADMIN — FENTANYL CITRATE 25 MCG: 50 INJECTION INTRAMUSCULAR; INTRAVENOUS at 09:29

## 2023-01-19 NOTE — NURSING NOTE
Patient developed hives/rash/redness on abdomen  Stated that rash began this AM with chlorhexidine soap shower and worsened with CHG wipes  Instructed patient to wash area with soap and water  Anesthesia at bedside  New order for IV Benadryl  Dr Luisa Lane made aware and sent pic of rash  Surgeon instructed this RN to give IV Benadryl and reassess rash in 15 minutes  Redness improved with IV med but hives still present over surgical site  OR charge made aware  Lucas texted surgical resident and asked if they could reassess pt's rash and give okay to proceed with surgery  Resident at bedside  Gave okay to proceed  OR made aware   Chlorhexidine added to pt's allergy list

## 2023-01-19 NOTE — DISCHARGE INSTR - AVS FIRST PAGE
[facility] Instructions  Dr Terrie BELL, FACS    1  General: You will feel pulling sensations around the wound or funny aches and pains around the incisions  This is normal  Even minor surgery is a change in your body and this is your body’s way of reaction to it  If you have had abdominal surgery, it may help to support the incision with a small pillow or blanket for comfort when moving or coughing  2  Wound care: Make sure to remove the bandage in about 24 hours, unless instructed otherwise  You usually don't have to redress the wound after 24-48 hours, unless for comfort  Keep the incision clean and dry  Let air get to it  If this Steri-Strips fall off, just keep the wound clean  3  Water: You may shower over the wound, unless there are drain tubes left in place  Do not bathe or use a pool or hot tub until cleared by the physician  You may shower right over the staples or Steri-Strips and packing dry when you are done  4  Activity: You may go up and down stairs, walk as much as you are comfortable, but walk at least 3 times each day  If you have had abdominal surgery, do not lift anything heavier than 15 pounds for at least 2-4 weeks, unless cleared by the doctor  5  Diet: You may resume a regular diet  If you had a same-day surgery or overnight stay surgery, you may wish to eat lightly for a few days: soups, crackers, and sandwiches  You may resume a regular diet when ready  6  Medications: Resume all of your previous medications, unless told otherwise by the doctor  Avoid aspirin or ibuprofen (Advil, Motrin, etc ) products for 2-3 days after the date of surgery  You may, at that time, began to take them again  Tylenol is always fine, unless you are taking any narcotic pain medication containing Tylenol (such as Percocet, Darvocet, Vicodin, or anything containing acetaminophen)  Do not take Tylenol if you're taking these medications   You do not need to take the narcotic pain medications unless you are having significant pain and discomfort  7  Driving: You will need someone to drive you home on the day of surgery  Do not drive or make any important decisions while on narcotic pain medication or 24 hours and after anesthesia or sedation for surgery  Generally, you may drive when your off all narcotic pain medications  8  Upset Stomach: You may take Maalox, Tums, or similar items for an upset stomach  If your narcotic pain medication causes an upset stomach, do not take it on an empty stomach  Try taking it with at least some crackers or toast      9  Constipation: Patients often experienced constipation after surgery  You may take over-the-counter medication for this, such as Metamucil, Senokot, Dulcolax, milk of magnesia, etc  You may take a suppository unless you have had anorectal surgery such as a procedure on your hemorrhoids  If you experience significant nausea or vomiting after abdominal surgery, call the office before trying any of these medications  10  Call the office: If you are experiencing any of the following, fevers above 101 5°, significant nausea or vomiting, if the wound develops drainage and/or is excessive redness around the wound, or if you have significant diarrhea or other worsening symptoms  11  Pain: You may be given a prescription for pain  This will be given to the hospital, the day of surgery  12  Sexual Activity: You may resume sexual activity when you feel ready and comfortable and your incision is sealed and healed without apparent infection risk  13  Urination: If you haven't urinated in 6 hours, go directly to the ER for evaluation for urinary retention       [address], [address]  [place], [address]   Phone: [phone]

## 2023-01-19 NOTE — ANESTHESIA PREPROCEDURE EVALUATION
Procedure:  REPAIR HERNIA EPIGASTRIC OPEN (Abdomen)    Relevant Problems   CARDIO   (+) Mixed hyperlipidemia   (+) Pacemaker   (+) Sick sinus syndrome (HCC)      /RENAL   (+) Prostate cancer (HCC)      Other   (+) Left inguinal pain   (+) Non-recurrent unilateral inguinal hernia without obstruction or gangrene   (+) Splenic lesion        Physical Exam    Airway    Mallampati score: II  TM Distance: >3 FB  Neck ROM: full     Dental   No notable dental hx     Cardiovascular  Rhythm: regular, Rate: normal, Cardiovascular exam normal    Pulmonary  Pulmonary exam normal Breath sounds clear to auscultation,     Other Findings        Anesthesia Plan  ASA Score- 3     Anesthesia Type- general with ASA Monitors  Additional Monitors:   Airway Plan:     Comment: Rash over  Lower chest after surgical shower and wipes  Checked by resident, Saud Bryan for OR  Benadryl IV ordered in SDS  Pacer in [facility] Plan Factors-    Chart reviewed  EKG reviewed  Existing labs reviewed  Patient summary reviewed  Patient is not a current smoker  Patient not instructed to abstain from smoking on day of procedure  Patient did not smoke on day of surgery  There is medical exclusion for perioperative obstructive sleep apnea risk education  Induction- intravenous  Postoperative Plan-     Informed Consent- Anesthetic plan and risks discussed with patient  Pt just need a new rx sent to [facility].     Alexis JALLOH

## 2023-01-19 NOTE — OP NOTE
REPAIR HERNIA EPIGASTRIC OPEN  Postoperative Note  PATIENT NAME: Breanne Henderson  : 1945  MRN: [id number]  [facility] OR ROOM 04    Surgery Date: 2023      Consent:  The risks, benefits, and alternatives to the surgery were discussed with the patient and with the family prior to surgery, personally by Dr Martina Mendiola  If the consent was obtained by the physician assistant or other representative, the consent was reviewed once again personally by the operating physician  Common complications particular for this procedure as well as unusual complications were discussed, including but not limited to:  bleeding, wound infection, prolonged wound healing, open wounds, reoperation, leak from the bowel or viscus, leak from the bile duct or injury to adjacent or other organs or blood vessels in the abdomen  A  was used if necessary  The patient expressed understanding of the issues discussed and wished and consented to the procedure to proceed  All questions were answered  Dr Martina Mendiola personally discussed the informed consent with this patient  Pre operative diagnosis:  Epigastric hernia [K43 9]    Operative Indications:  Symptomatic Ventral Hernia    Operative Findings:  Small subcentimeter epigastric hernia containing falciform ligament  Hernia sac sent for pathology evaluation    Post operative diagnosis:  Post-Op Diagnosis Codes:     * Epigastric hernia [K43 9]    Procedure:   Procedure(s):  REPAIR HERNIA EPIGASTRIC OPEN    Surgeon(s) and Role:     * Shea Mooney MD - Primary     * Akua Cantu MD - Assisting     *       The assistant was medically necessary for surgical safety the case including suturing, retraction, and hemostasis  A qualified resident was available  I provided direct and immediate supervision  I was present for the entire procedure       Drains:  * No LDAs found *    Specimens:  ID Type Source Tests Collected by Time Destination   1 : Epigastric hernia sac Tissue Abdominal TISSUE EXAM Minus MD Margarita 1/19/2023 4283        Estimated Blood Loss:   Minimal    Anesthesia Type:   Choice       Procedure and Technique:    The patient was seen again in the Holding Room  The risks, benefits, complications, treatment options, and expected outcomes were discussed with the patient  The patient and/or family concurred with the proposed plan, giving informed consent  Informed consent was personally discussed with the patient and reviewed  The site of surgery properly noted/marked  The patient was taken to Operating Room, identified as Jonathon Betancourt   and the procedure verified  A Time Out was held after prepping and draping in sterile fashion  The above information was confirmed  Prior to the induction of general anesthesia, antibiotic prophylaxis was administered  General endotracheal anesthesia was then administered and tolerated well  After the induction, the surgical area was prepped in the usual sterile fashion  A small incision was made over the obvious hernia  Dissection carried out to the fascia  The hernia sac was cleared of extraneous tissue and excess hernia sac was excised  The fascia was cleared of subcutaneous tissue  The remnant hernia sac was reduced  The peritoneum was carefully teased off the underside of the fascia to create a preperitoneal space  In fact this was a high epigastric hernia and the posterior layer was closed using a #1 Vicryl running suture  The anterior fascia was closed using a running buried #1 PDS suture  The excess subcutaneous tissue was closed over the repair  The wound was irrigated  The subcutaneous tissues were closed using interrupted 3 0 Vicryl sutures  The skin was closed using a 4 Monocryl subcuticular stitch  The wound was dressed the patient tolerated the procedure well  There were no complications  Sponge and needle count were correct at the close of the case    RFA wand to count was correct at the end of the procedure     I was present for the entire procedure      Signature:   Mago Pederson MD  Date: 1/19/2023 Time: 9:23 AM

## 2023-01-20 ENCOUNTER — TELEPHONE (OUTPATIENT)
Dept: SURGERY | Facility: CLINIC | Age: 78
End: 2023-01-20

## 2023-01-20 NOTE — TELEPHONE ENCOUNTER
S/P = EPIGASTRIC HERNIA REPAIR = 1/19/2023    Spoke with patient  He denies having any F/V/N/C and has not had a BM yet  He noted already taking metamucil, but he was given additional instructions regarding BM's too  He plans to take a sponge bath today and was instructed to take a full shower tomorrow  Ok to leave the mepilex bandage on for 3-4 days before removing and ok to shower as normal before and after the bandage is removed  Once bandage is removed he will have steri-strips/glue on the incision, don't pick or peel at them and don't use any antibacterial treatments, creams, lotions or ointments  Keep the incision clean and dry and monitor for signs of infection  Can use ice 20 mins on 20 mins off 1 hour at a time for swelling  Can also use Tylenol, Motrin or Aleve for pain/discomfort once pain meds are exhausted or he doesn't feel the need to use the pain medications  OK to rotate Tylenol and Motrin every 4 hours if needed too  Continue to wear the abdominal binder as much as possible and as much as tolerated but ok to not wear while resting  Do not wear during showers  Path pending  Post op confirmed  Advised to call with any questions, concerns, or problems

## 2023-01-24 ENCOUNTER — IN-CLINIC DEVICE VISIT (OUTPATIENT)
Dept: CARDIOLOGY CLINIC | Facility: CLINIC | Age: 78
End: 2023-01-24

## 2023-01-24 DIAGNOSIS — Z95.0 PRESENCE OF CARDIAC PACEMAKER: Primary | ICD-10-CM

## 2023-01-24 NOTE — PROGRESS NOTES
Results for orders placed or performed in visit on 01/24/23   Cardiac EP device report    Narrative    SJM-DUAL CHAMBER PPM  (DDD MODE) - NOT MRI CONDITIONAL  NON-BILLABLE DEVICE INTERROGATED IN THE [place] OFFICE  BATTERY STATUS: BATTERY VOLTAGE NEARING IMER (2 65V/RRT 2 5 V~0-0 25YRS)  WILL SCHEDULE MONTHLY BATTERY CHECKS  AP: 3 3%  : 4 6%  ALL LEAD PARAMETERS WITHIN NORMAL LIMITS  NO SIGNIFICANT HIGH RATE EPISODES  NO PROGRAMMING CHANGES MADE TO DEVICE PARAMETERS  PACEMAKER FUNCTIONING APPROPRIATELY    [address] [place] [address]

## 2023-01-25 NOTE — TELEPHONE ENCOUNTER
Path final    Left message asking patient to call back  Final Diagnosis   A  Epigastric hernia sac:     - Fibroadipose tissue with focus of dense fibrosis encasing a venous structure

## 2023-01-26 NOTE — PROGRESS NOTES
Assessment/Plan:   Jw Hansen is a 65 y o male who comes in today for postoperative check after : primary open epigastric hernia repair on 1/19/23 with Dr Denton Favre  Pathology: Reviewed with patient, all questions answered  Final Diagnosis   A  Epigastric hernia sac:     - Fibroadipose tissue with focus of dense fibrosis encasing a venous structure  CT ab/pelvis  With contrast ordered to follow up on splenic lesions  We will follow up via phone call after CT results return  We will also get another CT 6 months from the CT in March  Patient is very pleased with the outcome of their surgery  Postoperative restrictions reviewed, including specific lifting and exercise restrictions  All questions answered  ______________________________________________________  HPI:  Jw Hansen is a 65 y o male who comes in today for postoperative check after recent primary open epigastric hernia repair on 1/19/23 with Dr Denton Favre  Currently doing well without problems, no fever or chills,no nausea and no vomiting  No chest pain or trouble breathing  Reports no issues  ROS:  General ROS: negative for - chills, fatigue, fever or night sweats, weight loss  Respiratory ROS: no cough, shortness of breath, or wheezing  Cardiovascular ROS: no chest pain or dyspnea on exertion  Genito-Urinary ROS: no dysuria, trouble voiding, or hematuria  Musculoskeletal ROS: negative for - gait disturbance, joint pain or muscle pain  Neurological ROS: no TIA or stroke symptoms  GI ROS: see HPI  Skin ROS: no new rashes or lesions   Lymphatic ROS: no new adenopathy noted by pt     GYN ROS: see HPI, no new GYN history or bleeding noted  Psy ROS: no new mental or behavioral disturbances       Patient Active Problem List   Diagnosis   • Prostate cancer ([facility] )   • Non-recurrent unilateral inguinal hernia without obstruction or gangrene   • Leukopenia   • Sick sinus syndrome ([facility] )   • Mixed hyperlipidemia   • Pacemaker   • Left inguinal pain   • Epigastric hernia   • Splenic lesion   • Abnormal CT of the abdomen   • Fractured atrial pacemaker lead wire   • Bronchitis   • Narcolepsy         Chlorhexidine      Current Outpatient Medications:   •  atorvastatin (LIPITOR) 20 mg tablet, TAKE 1 TABLET BY MOUTH  DAILY IN THE EARLY MORNING, Disp: 90 tablet, Rfl: 3  •  cholecalciferol (VITAMIN D3) 1,000 units tablet, Take 1,000 Units by mouth daily, Disp: , Rfl:   •  Cyanocobalamin (B-12) 1000 MCG CAPS, Take by mouth daily, Disp: , Rfl:   •  fluticasone (FLONASE) 50 mcg/act nasal spray, SPRAY 1 SPRAY INTO EACH NOSTRIL TWICE A DAY, Disp: 16 mL, Rfl: 5  •  ibuprofen (MOTRIN) 200 mg tablet, Take 200 mg by mouth every 6 (six) hours as needed for mild pain, Disp: , Rfl:   •  modafinil (PROVIGIL) 200 MG tablet, Take 200 mg by mouth daily, Disp: , Rfl:   •  guaifenesin-codeine (GUAIFENESIN AC) 100-10 MG/5ML liquid, 1-2 TSP Q6HR PRN COUGH (Patient not taking: Reported on 1/18/2023), Disp: 120 mL, Rfl: 1  •  HYDROcodone-acetaminophen (NORCO) 5-325 mg per tablet, Take 1 tablet by mouth every 6 (six) hours as needed for pain for up to 5 doses Max Daily Amount: 4 tablets, Disp: 5 tablet, Rfl: 0    Past Medical History:   Diagnosis Date   • Cancer ([facility] )     skin - L cheek carcinoma   • Elevated PSA    • Hyperlipidemia    • Melanoma (HCC)     L cheek, surgically removed   • Narcolepsy    • Pacemaker 1988    Symptomatic Bradycardia   • Prostate cancer (HCC)    • Snoring        Past Surgical History:   Procedure Laterality Date   • ABDOMINAL HERNIA REPAIR N/A 1/19/2023    Procedure: REPAIR HERNIA EPIGASTRIC OPEN;  Surgeon: Giulia Ann MD;  [facility];  Service: General   • ATRIAL CARDIAC PACEMAKER INSERTION     • CARDIAC CATHETERIZATION  02/12/2015   • COLONOSCOPY     • INSERTION OF FIDUCIAL MARKER (TRANSRECTAL ULTRASOUND GUIDANCE) N/A 2/3/2020    Procedure: INSERTION OF FIDUCIAL MARKER (TRANSRECTAL ULTRASOUND GUIDANCE) SPACEOAR;  Surgeon: Kisha Benavidez MD;  Location: [facility];  Service: Urology   • IR UPPER EXTREMITY VENOGRAM- DIAGNOSTIC  11/8/2022   • CT RPR 1ST INGUN HRNA AGE 5 YRS/> REDUCIBLE Left 12/20/2019    Procedure: INGUINAL HERNIA REPAIR;  Surgeon: Ankur Coello DO;  Location: [facility];  Service: General   • SKIN CANCER EXCISION      left cheek       Family History   Problem Relation Age of Onset   • Coronary artery disease Mother    • Coronary artery disease Father    • Skin cancer Father    • Hyperlipidemia Father    • Cancer Father         bladder cancer   • Coronary artery disease Brother         reports that he has never smoked  He has never used smokeless tobacco  He reports current alcohol use of about 8 0 standard drinks per week  He reports that he does not use drugs      Vitals:    02/02/23 1252   BP: 120/60   Pulse: 65   Temp: 97 8 °F (36 6 °C)       PHYSICAL EXAM  General: normal, cooperative, no distress  Abdominal: soft, nondistended or nontender  Incision: clean, dry, and intact and healing well      Neda Morgan MD    Date: 2/2/2023 Time: 1:13 PM

## 2023-02-02 ENCOUNTER — OFFICE VISIT (OUTPATIENT)
Dept: SURGERY | Facility: CLINIC | Age: 78
End: 2023-02-02

## 2023-02-02 VITALS
TEMPERATURE: 97.8 F | BODY MASS INDEX: 20.61 KG/M2 | DIASTOLIC BLOOD PRESSURE: 60 MMHG | HEART RATE: 65 BPM | SYSTOLIC BLOOD PRESSURE: 120 MMHG | WEIGHT: 136 LBS | HEIGHT: 68 IN

## 2023-02-02 DIAGNOSIS — D73.89 SPLENIC LESION: ICD-10-CM

## 2023-02-02 DIAGNOSIS — Z09 POSTOP CHECK: Primary | ICD-10-CM

## 2023-02-02 DIAGNOSIS — D73.89 SPLENIC LESION: Primary | ICD-10-CM

## 2023-02-06 ENCOUNTER — APPOINTMENT (OUTPATIENT)
Dept: LAB | Facility: HOSPITAL | Age: 78
End: 2023-02-06

## 2023-02-06 DIAGNOSIS — Z11.59 NEED FOR HEPATITIS C SCREENING TEST: ICD-10-CM

## 2023-02-06 LAB — HCV AB SER QL: NORMAL

## 2023-02-21 ENCOUNTER — TELEPHONE (OUTPATIENT)
Dept: CARDIOLOGY CLINIC | Facility: CLINIC | Age: 78
End: 2023-02-21

## 2023-02-21 DIAGNOSIS — I49.5 SICK SINUS SYNDROME (HCC): ICD-10-CM

## 2023-02-21 DIAGNOSIS — Z95.0 CARDIAC PACEMAKER IN SITU: ICD-10-CM

## 2023-02-21 DIAGNOSIS — Z95.0 PRESENCE OF CARDIAC PACEMAKER: Primary | ICD-10-CM

## 2023-02-23 NOTE — TELEPHONE ENCOUNTER
Patient scheduled for Dual chamber Pacer at [facility] on 04/06/2023 with Dr Mary Ellen Jiménez  Patient aware of general instructions mail out with labs order     No Meds holds:   Can we please check insurance for approval    Can we please have the case

## 2023-03-06 ENCOUNTER — PREP FOR PROCEDURE (OUTPATIENT)
Dept: CARDIOLOGY CLINIC | Facility: CLINIC | Age: 78
End: 2023-03-06

## 2023-03-06 DIAGNOSIS — I49.5 SSS (SICK SINUS SYNDROME) (HCC): ICD-10-CM

## 2023-03-06 DIAGNOSIS — Z45.010 PACEMAKER GENERATOR END OF LIFE: Primary | ICD-10-CM

## 2023-03-16 ENCOUNTER — OFFICE VISIT (OUTPATIENT)
Dept: CARDIOLOGY CLINIC | Facility: CLINIC | Age: 78
End: 2023-03-16

## 2023-03-16 VITALS
HEIGHT: 68 IN | WEIGHT: 142.8 LBS | BODY MASS INDEX: 21.64 KG/M2 | HEART RATE: 55 BPM | DIASTOLIC BLOOD PRESSURE: 60 MMHG | SYSTOLIC BLOOD PRESSURE: 126 MMHG

## 2023-03-16 DIAGNOSIS — I49.5 SICK SINUS SYNDROME (HCC): Primary | ICD-10-CM

## 2023-03-16 NOTE — PROGRESS NOTES
Electrophysiology Office Note    Jasmin Aldana  1945  [id number]  HEART & VASCULAR Denisse Carias  [facility] [place] [id number]        Assessment/Plan     Primary diagnosis:   1  Pacemaker IMER    * patient presents to [facility] EP office for discuss of PPM generator change  He is arranged for generator change in April 2023  * patient had original RIGHT SIDED (he is left handed) SJM pacemaker place in August of 1988 due to syncope from SSS  In 2006 he had an atrial lead revision as he has clear fracture of old atrial lead  * he AP- both <20% of the time  ECG AS VS today    * interrogations showing stable lead parameters    * no EF in our system    * per patient Dr Fatuma Davidson has discussed possibly adding new leads during time of gen change or new pacemaker on the left  Patient was sent for venogram which showed narrowing of right SC vein with large collaterals  * he has allergy to chlorhexainde, will not be able to use hibiclens  * will clarify with attending on what procedure patient should expect  I could not answer all of patients questions today  Secondary diagnosis:   1  Hx epigastric hernia s/p repair (2023)   2  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/2023) - healing well - followed by [facility]  3  HLD               Rhythm History:   Atrial fibrillation:     Atrial flutter:     SVT:     VT/VF/PVC:     Device history:   Pacemaker:    Defibrillator:    BIV PPM:    BIV ICD:    ILR:      Cardiac Testing:     ECHO: No results found for this or any previous visit  History of Present Illness     HPI/INTERVAL HISTORY:  Since last OV he has no new EP concerns or complaints  Recently had MOHS and then surgery for cancer on the left side of his face but he is healing well  Used to be a    Spends time with his wife attending classes at [employer] college every semester        Review of Systems  ROS as noted above, otherwise 12 point review of systems was performed and is negative  Historical Information   Social History     Socioeconomic History   • Marital status: /Civil Union     Spouse name: Not on file   • Number of children: 3   • Years of education: Not on file   • Highest education level: Not on file   Occupational History   • Not on file   Tobacco Use   • Smoking status: Never   • Smokeless tobacco: Never   Vaping Use   • Vaping Use: Never used   Substance and Sexual Activity   • Alcohol use: Yes     Alcohol/week: 7 0 standard drinks     Types: 7 Glasses of wine per week     Comment: 1 glass beer/wine daily   • Drug use: No   • Sexual activity: Not Currently     Comment: medication not effective since radiation  Has ED  Other Topics Concern   • Not on file   Social History Narrative   • Not on file     Social Determinants of Health     Financial Resource Strain: Low Risk    • Difficulty of Paying Living Expenses: Not hard at all   Food Insecurity: Not on file   Transportation Needs: No Transportation Needs   • Lack of Transportation (Medical): No   • Lack of Transportation (Non-Medical):  No   Physical Activity: Not on file   Stress: Not on file   Social Connections: Not on file   Intimate Partner Violence: Not on file   Housing Stability: Not on file     Past Medical History:   Diagnosis Date   • Cancer ([facility] )     skin - L cheek carcinoma   • Elevated PSA    • Hyperlipidemia    • [facility]anoma ([facility] )     L cheek, surgically removed   • Narcolepsy    • Pacemaker 1988    Symptomatic Bradycardia   • Prostate cancer ([facility] )    • Snoring      Past Surgical History:   Procedure Laterality Date   • ABDOMINAL HERNIA REPAIR N/A 1/19/2023    Procedure: REPAIR HERNIA EPIGASTRIC OPEN;  Surgeon: Neda Morgan MD;  [facility];  Service: General   • ATRIAL CARDIAC PACEMAKER INSERTION     • CARDIAC CATHETERIZATION  02/12/2015   • COLONOSCOPY     • INSERTION OF FIDUCIAL MARKER (TRANSRECTAL ULTRASOUND GUIDANCE) N/A 2/3/2020    Procedure: INSERTION OF FIDUCIAL MARKER (TRANSRECTAL ULTRASOUND GUIDANCE) SPACEOAR;  Surgeon: Taiwo Roman MD;  [facility];  Service: Urology   • IR UPPER EXTREMITY VENOGRAM- DIAGNOSTIC  11/8/2022   • MD RPR 1ST INGUN HRNA AGE 4 YRS/> REDUCIBLE Left 12/20/2019    Procedure: INGUINAL HERNIA REPAIR;  Surgeon: Tabatha Vigil DO;  [facility];  Service: General   • SKIN CANCER EXCISION      left cheek     Social History     Substance and Sexual Activity   Alcohol Use Yes   • Alcohol/week: 7 0 standard drinks   • Types: 7 Glasses of wine per week    Comment: 1 glass beer/wine daily     Social History     Substance and Sexual Activity   Drug Use No     Social History     Tobacco Use   Smoking Status Never   Smokeless Tobacco Never     Family History   Problem Relation Age of Onset   • Coronary artery disease Mother    • Coronary artery disease Father    • Skin cancer Father    • Hyperlipidemia Father    • Cancer Father         bladder cancer   • Coronary artery disease Brother        Meds/Allergies       Current Outpatient Medications:   •  atorvastatin (LIPITOR) 20 mg tablet, TAKE 1 TABLET BY MOUTH  DAILY IN THE EARLY MORNING, Disp: 90 tablet, Rfl: 3  •  cholecalciferol (VITAMIN D3) 1,000 units tablet, Take 1,000 Units by mouth daily, Disp: , Rfl:   •  Cyanocobalamin (B-12) 1000 MCG CAPS, Take by mouth daily, Disp: , Rfl:   •  fluticasone (FLONASE) 50 mcg/act nasal spray, SPRAY 1 SPRAY INTO EACH NOSTRIL TWICE A DAY, Disp: 16 mL, Rfl: 5  •  modafinil (PROVIGIL) 200 MG tablet, Take 200 mg by mouth daily, Disp: , Rfl:     Allergies   Allergen Reactions   • Chlorhexidine Hives and Rash       Objective   Vitals: Blood pressure 126/60, pulse 55, height 5' 8" (1 727 m), weight 64 8 kg (142 lb 12 8 oz)  Physical Exam  Constitutional:       Appearance: He is well-developed  HENT:      Head: Normocephalic and atraumatic     Eyes:      Pupils: Pupils are equal, round, and reactive to light  Cardiovascular:      Rate and Rhythm: Normal rate and regular rhythm  Pulmonary:      Effort: Pulmonary effort is normal       Breath sounds: Normal breath sounds  Abdominal:      General: Bowel sounds are normal       Palpations: Abdomen is soft  Musculoskeletal:         General: Normal range of motion  Cervical back: Normal range of motion and neck supple  Skin:     General: Skin is warm and dry  Neurological:      Mental Status: He is alert and oriented to person, place, and time  Labs:  Appointment on 02/06/2023   Component Date Value   • Hepatitis C Ab 02/06/2023 Non-reactive    Admission on 01/19/2023, Discharged on 01/19/2023   Component Date Value   • Case Report 01/19/2023                      Value:Surgical Pathology Report                         Case: [id number]                                   Authorizing Provider:  Ana Laura Garcia MD        Collected:           01/19/2023 0906              [facility]     [facility]        [facility]                                                     Pathologist:           Calos Bahena MD                                                         Specimen:    Abdominal, Epigastric hernia sac                                                          • Final Diagnosis 01/19/2023                      Value: This result contains rich text formatting which cannot be displayed here  • Note 01/19/2023                      Value: This result contains rich text formatting which cannot be displayed here  • Additional Information 01/19/2023                      Value: This result contains rich text formatting which cannot be displayed here  • Gross Description 01/19/2023                      Value: This result contains rich text formatting which cannot be displayed here         Imaging: I have personally reviewed pertinent reports

## 2023-03-16 NOTE — H&P (VIEW-ONLY)
Electrophysiology Office Note    Giorgi Napoles  1945  [id number]  HEART & VASCULAR [facility] [id number]        Assessment/Plan     Primary diagnosis:   1  Pacemaker IMER    * patient presents to [facility] EP office for discuss of PPM generator change  He is arranged for generator change in April 2023  * patient had original RIGHT SIDED (he is left handed) SJM pacemaker place in August of 1988 due to syncope from SSS  In 2006 he had an atrial lead revision as he has clear fracture of old atrial lead  * he AP- both <20% of the time  ECG AS VS today    * interrogations showing stable lead parameters    * no EF in our system    * per patient Dr Lexie Meier has discussed possibly adding new leads during time of gen change or new pacemaker on the left  Patient was sent for venogram which showed narrowing of right SC vein with large collaterals  * he has allergy to chlorhexainde, will not be able to use hibiclens  * will clarify with attending on what procedure patient should expect  I could not answer all of patients questions today  Secondary diagnosis:   1  Hx epigastric hernia s/p repair (2023)   2  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/2023) - healing well - followed by [facility]  3  HLD               Rhythm History:   Atrial fibrillation:     Atrial flutter:     SVT:     VT/VF/PVC:     Device history:   Pacemaker:    Defibrillator:    BIV PPM:    BIV ICD:    ILR:      Cardiac Testing:     ECHO: No results found for this or any previous visit  History of Present Illness     HPI/INTERVAL HISTORY:  Since last OV he has no new EP concerns or complaints  Recently had MOHS and then surgery for cancer on the left side of his face but he is healing well  Used to be a    Spends time with his wife attending classes at [employer] college every semester        Review of Systems  ROS as noted above, otherwise 12 point review of systems was performed and is negative  Historical Information   Social History     Socioeconomic History   • Marital status: /Civil Union     Spouse name: Not on file   • Number of children: 3   • Years of education: Not on file   • Highest education level: Not on file   Occupational History   • Not on file   Tobacco Use   • Smoking status: Never   • Smokeless tobacco: Never   Vaping Use   • Vaping Use: Never used   Substance and Sexual Activity   • Alcohol use: Yes     Alcohol/week: 7 0 standard drinks     Types: 7 Glasses of wine per week     Comment: 1 glass beer/wine daily   • Drug use: No   • Sexual activity: Not Currently     Comment: medication not effective since radiation  Has ED  Other Topics Concern   • Not on file   Social History Narrative   • Not on file     Social Determinants of Health     Financial Resource Strain: Low Risk    • Difficulty of Paying Living Expenses: Not hard at all   Food Insecurity: Not on file   Transportation Needs: No Transportation Needs   • Lack of Transportation (Medical): No   • Lack of Transportation (Non-Medical):  No   Physical Activity: Not on file   Stress: Not on file   Social Connections: Not on file   Intimate Partner Violence: Not on file   Housing Stability: Not on file     Past Medical History:   Diagnosis Date   • [facility]     skin - L cheek carcinoma   • Elevated PSA    • Hyperlipidemia    • [facility] )     L cheek, surgically removed   • Narcolepsy    • Pacemaker 1988    Symptomatic Bradycardia   • Prostate cancer ([facility] )    • Snoring      Past Surgical History:   Procedure Laterality Date   • ABDOMINAL HERNIA REPAIR N/A 1/19/2023    Procedure: REPAIR HERNIA EPIGASTRIC OPEN;  Surgeon: Holland Evans MD;  Location: [facility] OR;  Service: General   • ATRIAL CARDIAC PACEMAKER INSERTION     • CARDIAC CATHETERIZATION  02/12/2015   • COLONOSCOPY     • INSERTION OF FIDUCIAL MARKER "(TRANSRECTAL ULTRASOUND GUIDANCE) N/A 2/3/2020    Procedure: INSERTION OF FIDUCIAL MARKER (TRANSRECTAL ULTRASOUND GUIDANCE) SPACEOAR;  Surgeon: Edna Khoury MD;  [facility];  Service: Urology   • IR UPPER EXTREMITY VENOGRAM- DIAGNOSTIC  11/8/2022   • DC RPR 1ST INGUN HRNA AGE 4 YRS/> REDUCIBLE Left 12/20/2019    Procedure: INGUINAL HERNIA REPAIR;  Surgeon: Lisbeth Galeas DO;  [facility];  Service: General   • SKIN CANCER EXCISION      left cheek     Social History     Substance and Sexual Activity   Alcohol Use Yes   • Alcohol/week: 7 0 standard drinks   • Types: 7 Glasses of wine per week    Comment: 1 glass beer/wine daily     Social History     Substance and Sexual Activity   Drug Use No     Social History     Tobacco Use   Smoking Status Never   Smokeless Tobacco Never     Family History   Problem Relation Age of Onset   • Coronary artery disease Mother    • Coronary artery disease Father    • Skin cancer Father    • Hyperlipidemia Father    • Cancer Father         bladder cancer   • Coronary artery disease Brother        Meds/Allergies       Current Outpatient Medications:   •  atorvastatin (LIPITOR) 20 mg tablet, TAKE 1 TABLET BY MOUTH  DAILY IN THE EARLY MORNING, Disp: 90 tablet, Rfl: 3  •  cholecalciferol (VITAMIN D3) 1,000 units tablet, Take 1,000 Units by mouth daily, Disp: , Rfl:   •  Cyanocobalamin (B-12) 1000 MCG CAPS, Take by mouth daily, Disp: , Rfl:   •  fluticasone (FLONASE) 50 mcg/act nasal spray, SPRAY 1 SPRAY INTO EACH NOSTRIL TWICE A DAY, Disp: 16 mL, Rfl: 5  •  modafinil (PROVIGIL) 200 MG tablet, Take 200 mg by mouth daily, Disp: , Rfl:     Allergies   Allergen Reactions   • Chlorhexidine Hives and Rash       Objective   Vitals: Blood pressure 126/60, pulse 55, height 5' 8\" (1 727 m), weight 64 8 kg (142 lb 12 8 oz)  Physical Exam  Constitutional:       Appearance: He is well-developed  HENT:      Head: Normocephalic and atraumatic     Eyes:      Pupils: Pupils " are equal, round, and reactive to light  Cardiovascular:      Rate and Rhythm: Normal rate and regular rhythm  Pulmonary:      Effort: Pulmonary effort is normal       Breath sounds: Normal breath sounds  Abdominal:      General: Bowel sounds are normal       Palpations: Abdomen is soft  Musculoskeletal:         General: Normal range of motion  Cervical back: Normal range of motion and neck supple  Skin:     General: Skin is warm and dry  Neurological:      Mental Status: He is alert and oriented to person, place, and time  Labs:  Appointment on 02/06/2023   Component Date Value   • Hepatitis C Ab 02/06/2023 Non-reactive    Admission on 01/19/2023, Discharged on 01/19/2023   Component Date Value   • Case Report 01/19/2023                      Value:Surgical Pathology Report                         Case: [id number]                                   Authorizing Provider:  Abraham Higgins MD        Collected:           01/19/2023 0906              [facility]     [facility]        [facility]                                                     Pathologist:           Joy Lam MD                                                         Specimen:    Abdominal, Epigastric hernia sac                                                          • Final Diagnosis 01/19/2023                      Value: This result contains rich text formatting which cannot be displayed here  • Note 01/19/2023                      Value: This result contains rich text formatting which cannot be displayed here  • Additional Information 01/19/2023                      Value: This result contains rich text formatting which cannot be displayed here  • Gross Description 01/19/2023                      Value: This result contains rich text formatting which cannot be displayed here         Imaging: I have personally reviewed pertinent reports

## 2023-03-20 ENCOUNTER — TELEPHONE (OUTPATIENT)
Dept: CARDIOLOGY CLINIC | Facility: CLINIC | Age: 78
End: 2023-03-20

## 2023-03-20 NOTE — TELEPHONE ENCOUNTER
Called patient and LVM in regard gen change procedure to be reschedule for sooner per Dr Maggie King

## 2023-03-20 NOTE — TELEPHONE ENCOUNTER
----- Message from Jalen Mendieta DO sent at 3/20/2023  6:56 AM EDT -----  Lets try to get his device changed next week please  Make it with medtronic  Thanks      Kassandra Maldonado

## 2023-03-21 ENCOUNTER — APPOINTMENT (OUTPATIENT)
Dept: LAB | Facility: HOSPITAL | Age: 78
End: 2023-03-21

## 2023-03-21 LAB
ALBUMIN SERPL BCP-MCNC: 4.3 G/DL (ref 3.5–5)
ALP SERPL-CCNC: 45 U/L (ref 34–104)
ALT SERPL W P-5'-P-CCNC: 15 U/L (ref 7–52)
ANION GAP SERPL CALCULATED.3IONS-SCNC: 8 MMOL/L (ref 4–13)
AST SERPL W P-5'-P-CCNC: 22 U/L (ref 13–39)
BASOPHILS # BLD AUTO: 0.05 THOUSANDS/ÂΜL (ref 0–0.1)
BASOPHILS NFR BLD AUTO: 1 % (ref 0–1)
BILIRUB SERPL-MCNC: 0.81 MG/DL (ref 0.2–1)
BUN SERPL-MCNC: 18 MG/DL (ref 5–25)
CALCIUM SERPL-MCNC: 9.3 MG/DL (ref 8.4–10.2)
CHLORIDE SERPL-SCNC: 104 MMOL/L (ref 96–108)
CO2 SERPL-SCNC: 29 MMOL/L (ref 21–32)
CREAT SERPL-MCNC: 0.87 MG/DL (ref 0.6–1.3)
EOSINOPHIL # BLD AUTO: 0.27 THOUSAND/ÂΜL (ref 0–0.61)
EOSINOPHIL NFR BLD AUTO: 5 % (ref 0–6)
ERYTHROCYTE [DISTWIDTH] IN BLOOD BY AUTOMATED COUNT: 13.2 % (ref 11.6–15.1)
GFR SERPL CREATININE-BSD FRML MDRD: 82 ML/MIN/1.73SQ M
GLUCOSE P FAST SERPL-MCNC: 93 MG/DL (ref 65–99)
HCT VFR BLD AUTO: 41.2 % (ref 36.5–49.3)
HGB BLD-MCNC: 13.9 G/DL (ref 12–17)
IMM GRANULOCYTES # BLD AUTO: 0.02 THOUSAND/UL (ref 0–0.2)
IMM GRANULOCYTES NFR BLD AUTO: 0 % (ref 0–2)
LYMPHOCYTES # BLD AUTO: 1.52 THOUSANDS/ÂΜL (ref 0.6–4.47)
LYMPHOCYTES NFR BLD AUTO: 28 % (ref 14–44)
MCH RBC QN AUTO: 31.7 PG (ref 26.8–34.3)
MCHC RBC AUTO-ENTMCNC: 33.7 G/DL (ref 31.4–37.4)
MCV RBC AUTO: 94 FL (ref 82–98)
MONOCYTES # BLD AUTO: 0.56 THOUSAND/ÂΜL (ref 0.17–1.22)
MONOCYTES NFR BLD AUTO: 10 % (ref 4–12)
NEUTROPHILS # BLD AUTO: 2.94 THOUSANDS/ÂΜL (ref 1.85–7.62)
NEUTS SEG NFR BLD AUTO: 56 % (ref 43–75)
NRBC BLD AUTO-RTO: 0 /100 WBCS
PLATELET # BLD AUTO: 188 THOUSANDS/UL (ref 149–390)
PMV BLD AUTO: 10.5 FL (ref 8.9–12.7)
POTASSIUM SERPL-SCNC: 4.4 MMOL/L (ref 3.5–5.3)
PROT SERPL-MCNC: 6.3 G/DL (ref 6.4–8.4)
RBC # BLD AUTO: 4.38 MILLION/UL (ref 3.88–5.62)
SODIUM SERPL-SCNC: 141 MMOL/L (ref 135–147)
WBC # BLD AUTO: 5.36 THOUSAND/UL (ref 4.31–10.16)

## 2023-03-27 ENCOUNTER — ANESTHESIA EVENT (OUTPATIENT)
Dept: NON INVASIVE DIAGNOSTICS | Facility: HOSPITAL | Age: 78
End: 2023-03-27

## 2023-03-27 NOTE — ANESTHESIA PREPROCEDURE EVALUATION
Procedure:  Cardiac pacer generator change (Chest)    Relevant Problems   ANESTHESIA (within normal limits)      CARDIO   (+) Mixed hyperlipidemia   (+) Pacemaker   (+) Sick sinus syndrome (HCC)      /RENAL   (+) Prostate cancer (HCC)      Other   (+) Splenic lesion        Physical Exam    Airway    Mallampati score: II  TM Distance: >3 FB  Neck ROM: full     Dental   No notable dental hx     Cardiovascular  Rhythm: regular, Rate: normal, Cardiovascular exam normal    Pulmonary  Pulmonary exam normal Breath sounds clear to auscultation,     Other Findings        Anesthesia Plan  ASA Score- 3     Anesthesia Type- IV sedation with anesthesia with ASA Monitors  Additional Monitors:   Airway Plan:           Plan Factors-Exercise tolerance (METS): >4 METS  Chart reviewed  EKG reviewed  Existing labs reviewed  Patient summary reviewed  Patient is not a current smoker  Induction-     Postoperative Plan-     Informed Consent- Anesthetic plan and risks discussed with patient  I personally reviewed this patient with the CRNA  Discussed and agreed on the Anesthesia Plan with the CRNA  Candida Hy

## 2023-03-28 ENCOUNTER — APPOINTMENT (OUTPATIENT)
Dept: RADIOLOGY | Facility: HOSPITAL | Age: 78
End: 2023-03-28

## 2023-03-28 ENCOUNTER — HOSPITAL ENCOUNTER (OUTPATIENT)
Facility: HOSPITAL | Age: 78
Setting detail: OUTPATIENT SURGERY
Discharge: HOME | End: 2023-03-28
Attending: INTERNAL MEDICINE | Admitting: INTERNAL MEDICINE

## 2023-03-28 ENCOUNTER — ANESTHESIA (OUTPATIENT)
Dept: NON INVASIVE DIAGNOSTICS | Facility: HOSPITAL | Age: 78
End: 2023-03-28

## 2023-03-28 VITALS
WEIGHT: 140 LBS | HEART RATE: 56 BPM | OXYGEN SATURATION: 96 % | TEMPERATURE: 98.1 F | RESPIRATION RATE: 18 BRPM | DIASTOLIC BLOOD PRESSURE: 58 MMHG | BODY MASS INDEX: 21.22 KG/M2 | HEIGHT: 68 IN | SYSTOLIC BLOOD PRESSURE: 119 MMHG

## 2023-03-28 DIAGNOSIS — I49.5 SSS (SICK SINUS SYNDROME) (HCC): ICD-10-CM

## 2023-03-28 DIAGNOSIS — Z45.010 PACEMAKER GENERATOR END OF LIFE: ICD-10-CM

## 2023-03-28 LAB
ANION GAP SERPL CALCULATED.3IONS-SCNC: 0 MMOL/L (ref 4–13)
ATRIAL RATE: 50 BPM
ATRIAL RATE: 58 BPM
BUN SERPL-MCNC: 17 MG/DL (ref 5–25)
CALCIUM SERPL-MCNC: 9.1 MG/DL (ref 8.3–10.1)
CHLORIDE SERPL-SCNC: 108 MMOL/L (ref 96–108)
CO2 SERPL-SCNC: 29 MMOL/L (ref 21–32)
CREAT SERPL-MCNC: 0.9 MG/DL (ref 0.6–1.3)
ERYTHROCYTE [DISTWIDTH] IN BLOOD BY AUTOMATED COUNT: 13.1 % (ref 11.6–15.1)
GFR SERPL CREATININE-BSD FRML MDRD: 81 ML/MIN/1.73SQ M
GLUCOSE P FAST SERPL-MCNC: 97 MG/DL (ref 65–99)
GLUCOSE SERPL-MCNC: 97 MG/DL (ref 65–140)
HCT VFR BLD AUTO: 42.5 % (ref 36.5–49.3)
HGB BLD-MCNC: 14.5 G/DL (ref 12–17)
MCH RBC QN AUTO: 31.7 PG (ref 26.8–34.3)
MCHC RBC AUTO-ENTMCNC: 34.1 G/DL (ref 31.4–37.4)
MCV RBC AUTO: 93 FL (ref 82–98)
P AXIS: 71 DEGREES
P AXIS: 73 DEGREES
PLATELET # BLD AUTO: 182 THOUSANDS/UL (ref 149–390)
PMV BLD AUTO: 10.4 FL (ref 8.9–12.7)
POTASSIUM SERPL-SCNC: 4.5 MMOL/L (ref 3.5–5.3)
PR INTERVAL: 176 MS
PR INTERVAL: 186 MS
QRS AXIS: 44 DEGREES
QRS AXIS: 45 DEGREES
QRSD INTERVAL: 84 MS
QRSD INTERVAL: 94 MS
QT INTERVAL: 434 MS
QT INTERVAL: 446 MS
QTC INTERVAL: 406 MS
QTC INTERVAL: 426 MS
RBC # BLD AUTO: 4.58 MILLION/UL (ref 3.88–5.62)
SODIUM SERPL-SCNC: 137 MMOL/L (ref 135–147)
T WAVE AXIS: 17 DEGREES
T WAVE AXIS: 31 DEGREES
VENTRICULAR RATE: 50 BPM
VENTRICULAR RATE: 58 BPM
WBC # BLD AUTO: 4.66 THOUSAND/UL (ref 4.31–10.16)

## 2023-03-28 DEVICE — ENVELOPE CMRM6122 ABSORB MED MR
Type: IMPLANTABLE DEVICE | Site: CHEST | Status: FUNCTIONAL
Brand: TYRX™

## 2023-03-28 DEVICE — IMPLANTABLE DEVICE
Type: IMPLANTABLE DEVICE | Site: CHEST | Status: FUNCTIONAL
Brand: M/IS-10

## 2023-03-28 DEVICE — PULSE GENERATOR
Type: IMPLANTABLE DEVICE | Site: CHEST | Status: FUNCTIONAL
Brand: ASSURITY MRI™

## 2023-03-28 RX ORDER — PROPOFOL 10 MG/ML
INJECTION, EMULSION INTRAVENOUS AS NEEDED
Status: DISCONTINUED | OUTPATIENT
Start: 2023-03-28 | End: 2023-03-28

## 2023-03-28 RX ORDER — GENTAMICIN SULFATE 40 MG/ML
INJECTION, SOLUTION INTRAMUSCULAR; INTRAVENOUS CODE/TRAUMA/SEDATION MEDICATION
Status: DISCONTINUED | OUTPATIENT
Start: 2023-03-28 | End: 2023-03-28 | Stop reason: HOSPADM

## 2023-03-28 RX ORDER — SODIUM CHLORIDE 9 MG/ML
75 INJECTION, SOLUTION INTRAVENOUS CONTINUOUS
Status: DISCONTINUED | OUTPATIENT
Start: 2023-03-28 | End: 2023-03-28

## 2023-03-28 RX ORDER — ACETAMINOPHEN 325 MG/1
650 TABLET ORAL EVERY 4 HOURS PRN
Status: DISCONTINUED | OUTPATIENT
Start: 2023-03-28 | End: 2023-03-28 | Stop reason: HOSPADM

## 2023-03-28 RX ORDER — CEFAZOLIN SODIUM 2 G/50ML
2000 SOLUTION INTRAVENOUS ONCE
Status: COMPLETED | OUTPATIENT
Start: 2023-03-28 | End: 2023-03-28

## 2023-03-28 RX ORDER — LIDOCAINE HYDROCHLORIDE 20 MG/ML
INJECTION, SOLUTION EPIDURAL; INFILTRATION; INTRACAUDAL; PERINEURAL AS NEEDED
Status: DISCONTINUED | OUTPATIENT
Start: 2023-03-28 | End: 2023-03-28

## 2023-03-28 RX ORDER — FENTANYL CITRATE/PF 50 MCG/ML
25 SYRINGE (ML) INJECTION
Status: CANCELLED | OUTPATIENT
Start: 2023-03-28

## 2023-03-28 RX ORDER — PROPOFOL 10 MG/ML
INJECTION, EMULSION INTRAVENOUS CONTINUOUS PRN
Status: DISCONTINUED | OUTPATIENT
Start: 2023-03-28 | End: 2023-03-28

## 2023-03-28 RX ORDER — LIDOCAINE HYDROCHLORIDE 10 MG/ML
INJECTION, SOLUTION EPIDURAL; INFILTRATION; INTRACAUDAL; PERINEURAL CODE/TRAUMA/SEDATION MEDICATION
Status: DISCONTINUED | OUTPATIENT
Start: 2023-03-28 | End: 2023-03-28 | Stop reason: HOSPADM

## 2023-03-28 RX ORDER — SODIUM CHLORIDE 9 MG/ML
INJECTION, SOLUTION INTRAVENOUS CONTINUOUS PRN
Status: DISCONTINUED | OUTPATIENT
Start: 2023-03-28 | End: 2023-03-28

## 2023-03-28 RX ORDER — DEXAMETHASONE SODIUM PHOSPHATE 10 MG/ML
INJECTION, SOLUTION INTRAMUSCULAR; INTRAVENOUS AS NEEDED
Status: DISCONTINUED | OUTPATIENT
Start: 2023-03-28 | End: 2023-03-28

## 2023-03-28 RX ADMIN — DEXAMETHASONE SODIUM PHOSPHATE 10 MG: 10 INJECTION, SOLUTION INTRAMUSCULAR; INTRAVENOUS at 08:33

## 2023-03-28 RX ADMIN — PROPOFOL 50 MG: 10 INJECTION, EMULSION INTRAVENOUS at 08:29

## 2023-03-28 RX ADMIN — LIDOCAINE HYDROCHLORIDE 100 MG: 20 INJECTION, SOLUTION EPIDURAL; INFILTRATION; INTRACAUDAL; PERINEURAL at 08:29

## 2023-03-28 RX ADMIN — PROPOFOL 50 MCG/KG/MIN: 10 INJECTION, EMULSION INTRAVENOUS at 08:29

## 2023-03-28 RX ADMIN — CEFAZOLIN SODIUM 2000 MG: 2 SOLUTION INTRAVENOUS at 08:23

## 2023-03-28 RX ADMIN — SODIUM CHLORIDE: 0.9 INJECTION, SOLUTION INTRAVENOUS at 08:16

## 2023-03-28 NOTE — DISCHARGE INSTR - AVS FIRST PAGE
Please refer to post pacemaker implantation discharge instructions and restrictions and your pacemaker booklet/temporary card  Keep incision dry for one week  Do not use lotions/powders/creams on incision  Leave outer bandage in place for 1 week - it is water proof, and as long as it is fully adhered to your skin you may shower with it  If it appears as though the bandage is coming off and/or there is any communication to the area of device incision, please then keep the whole area dry for the remaining week  After 1 week, please remove by pulling all edges away from the center of the bandage  Please call the office if you notice redness, swelling, bleeding, or drainage from incision or if you develop fevers  AFTER PACEMAKER CARE:    If you have any questions, please call [phone] to speak with a nurse (8:30am-4pm, or [phone] after hours)  For appointments, please call [phone]  WHAT YOU SHOULD KNOW:   A pacemaker is a small, battery-powered device that is placed under your skin in your upper chest area with wires placed through a vein that lead directly into the heart  It helps regulate your heart rate and prevent your heart from beating too slowly  AFTER YOU LEAVE:     Medicines:     Pain medicine: You may need medicine to take away or decrease pain  Learn how to take your medicine  Ask what medicine and how much you should take  Be sure you know how, when, and how often to take it  Usually Over the counter pain medicine is sufficient to control pain (Acetominophen or Ibuprofen) Ask your doctor if you may take these  If this does not control your pain, narcotic pain killers may be prescribed, please call if you need prescription  Do not wait until the pain is severe before you take your medicine  Tell caregivers if your pain does not decrease  Pain medicine can make you dizzy or sleepy   Prevent falls by calling someone when you get out of bed or if you need help  Take your medicine as directed  Call your healthcare provider if you think your medicine is not helping or if you have side effects  Tell him if you are allergic to any medicine  Follow up with your cardiologist after your procedure: You will need a follow-up visit approximately 2 weeks after you leave the hospital  Your cardiologist will check your wound and make sure that your pacemaker is working correctly  Follow the instructions to check your pacemaker: Your cardiologist or primary healthcare provider will check your pacemaker and the battery regularly  He will use a computer to check your pacemaker over the telephone or wireless device which will be given to you  Pacemaker batteries usually last 8 to 10 years  The pacemaker unit will be replaced when the battery gets low  This is a simpler procedure than the original one to implant your pacemaker  Wound care:  Keep your incision dry for one week  Do not use lotions/powders/creams on incision  Leave outer bandage in place for 1 week - it is water proof, and as long as it is fully adhered to your skin you may shower with it  If it appears as though the bandage is coming off and/or there is any communication to the area of device incision, please then keep the whole area dry for the remaining week  After 1 week, please remove by pulling all edges away from the center of the bandage  Please call the office if you notice redness, swelling, bleeding, or drainage from incision or if you develop fevers  Activity:   You may resume your normal activity following a generator change  Driving: you are able to drive 48 hours post PPM implant   Sports:  Ask your caregiver when it is okay to play tennis, golf, basketball, or any sport that requires you to lift your arms  Do not play full contact sports, such as football, that could damage your pacemaker   Ask your cardiologist or primary healthcare provider how much and what kinds of physical activity are safe for you  Living with a pacemaker:   Tell all caregivers you have a pacemaker: This includes surgeons, radiologists, and medical technicians  You may want to wear a medical alert ID bracelet or necklace that states that you have a pacemaker  Carry your pacemaker ID card: Make sure you receive a pacemaker ID card  Carry it with you at all times  It lists important information about your pacemaker  Show it to airport security if you travel  Avoid electrical interference:  Avoid welding equipment and other equipment with large magnets or electric fields  These things could interfere with how your pacemaker works  Use your cell phone on the ear opposite from your pacemaker  Do not carry your cell phone in your shirt pocket over your chest      Some Pacemakers are MRI safe  Ask you doctor if it is safe to proceed with MRI and let the radiologist and staff know you have a pacemaker  Do not touch the skin around your pacemaker: This can cause damage to the lead wires or move the pacemaker unit from where it should be  Contact your cardiologist or primary healthcare provider if:   The area around your pacemaker has increasing amount of pain after surgery  The pain should improve over first few days after implantation  The skin around your stitches has increasing redness, swelling, or has drainage  This may mean that you have an infection  You have a fever  You have chills, a cough, and feel weak or achy  These are also signs of infection  Your feet or ankles are more swollen than your baseline  Your Heart rate is less than 50 beats per minute     Seek care immediately if:   Your bandage becomes soaked with blood  Your pacemaker is swelling rapidly    Your stitches open up  You feel your heart suddenly beating very slowly or quickly  You become too weak or dizzy to stand, or you pass out  Your arm or leg feels warm, tender, and painful  It may look swollen and red  You have chest pain that does not go away with rest or medicine  You feel lightheaded, short of breath, and have chest pain  You cough up blood  © 2014 [address] is for End User's use only and may not be sold, redistributed or otherwise used for commercial purposes  All illustrations and images included in CareNotes® are the copyrighted property of A D A M , Inc  or Silvio Pacheco  The above information is an  only  It is not intended as medical advice for individual conditions or treatments  Talk to your doctor, nurse or pharmacist before following any medical regimen to see if it is safe and effective for you

## 2023-03-28 NOTE — INTERVAL H&P NOTE
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 with flap coverage 3/2023  His initial pacemaker was placed in 1988  In 2006 he underwent generator change as well as addition of new right atrial lead, as his prior right atrial lead was clearly fractured and nonfunctional   His atrial and ventricular pacing percentages are both less than 20%  Through routine device interrogations it was noted that he was nearing IMER  There was discussion about possibly adding new leads given that his RV lead is from [facility]  An upper extremity venogram was previously performed which showed narrowing of the central subclavian vein with large proximal collaterals secondary to his pacing wires  After further discussion and further testing of his wires, it was decided that he would undergo simple pacemaker generator change  He presents today to undergo that procedure  Other than his ongoing treatment for his facial sarcoma, no changes since he was  recently seen  He is stable from a cardiac standpoint, with no issues with his current pacemaker  Physical exam unchanged      Vitals:    03/28/23 0736   BP: 134/67   Pulse: (!) 51   Resp: 16   Temp: 98 2 °F (36 8 °C)   SpO2: 99%

## 2023-03-28 NOTE — ANESTHESIA POSTPROCEDURE EVALUATION
Post-Op Assessment Note    CV Status:  Stable  Pain Score: 0    Pain management: adequate     Mental Status:  Alert and awake   Hydration Status:  Euvolemic and stable   PONV Controlled:  Controlled   Airway Patency:  Patent   Two or more mitigation strategies used for obstructive sleep apnea   Post Op Vitals Reviewed: Yes      Staff: CRNA         No notable events documented      BP   106/55   Temp   97 6   Pulse 82   Resp   12   SpO2   98%

## 2023-03-28 NOTE — Clinical Note
The PACER GENERATOR ASSURITY MRI DR-RF device was inserted  The leads were placed into the connector and visually verified to be in correct position  Injury current obtained

## 2023-04-07 ENCOUNTER — IN-CLINIC DEVICE VISIT (OUTPATIENT)
Dept: CARDIOLOGY CLINIC | Facility: CLINIC | Age: 78
End: 2023-04-07

## 2023-04-07 DIAGNOSIS — Z95.0 CARDIAC PACEMAKER IN SITU: Primary | ICD-10-CM

## 2023-04-07 NOTE — PROGRESS NOTES
Results for orders placed or performed in visit on 04/07/23   Cardiac EP device report    Narrative    SJM-DUAL CHAMBER PPM  (DDD MODE) - NOT MRI CONDITIONAL  DEVICE INTERROGATED IN THE [place] OFFICE: BATTERY VOLTAGE ADEQUATE-9 YRS  AP 23%  0%  ALL AVAILABLE LEAD PARAMETERS WITHIN NORMAL LIMITS  NO SIGNIFICANT HIGH RATE EPISODES  NO PROGRAMMING CHANGES MADE TO DEVICE PARAMETERS  NORMAL DEVICE FUNCTION  WOUND CHECK: INCISION CLEAN AND DRY WITH EDGES APPROXIMATED; WOUND CARE AND RESTRICTIONS REVIEWED WITH PATIENT   NC

## 2023-05-04 ENCOUNTER — TELEPHONE (OUTPATIENT)
Dept: SURGERY | Facility: CLINIC | Age: 78
End: 2023-05-04

## 2023-05-04 NOTE — TELEPHONE ENCOUNTER
Called and left message for patient to return call to office  CT that was scheduled in March (for splenic lesion follow up) was cancelled by patient  Alert had been received prior to this - patient has had a lot going on with his skin cancer and with cardiology      Patient needs to get r/s for CT

## 2023-05-19 NOTE — TELEPHONE ENCOUNTER
Rec'd call from patient  He's almost finished with his treatment  He's ready to r/s CT Abd/Pel  Patient was given phone number for Central Scheduling

## 2023-06-01 ENCOUNTER — TELEPHONE (OUTPATIENT)
Dept: CARDIOLOGY CLINIC | Facility: CLINIC | Age: 78
End: 2023-06-01

## 2023-06-01 NOTE — TELEPHONE ENCOUNTER
Tboi Kohli, May 27th, 1945  I'm a patient of Doctor Kirsten Alexandre  I completed 30 courses of radiation therapy on my face on May 26th, 2023 at the [address] [place] [address]  I just received a message indicating that they I should have my device, my pacemaker device, checked within the next few days to make sure that there's been no problem with the device as a result of the radiation  So if you could get back to me, I'd like to schedule that  Thank you

## 2023-06-05 ENCOUNTER — HOSPITAL ENCOUNTER (OUTPATIENT)
Dept: CT IMAGING | Facility: HOSPITAL | Age: 78
Discharge: HOME | End: 2023-06-05
Attending: SURGERY
Payer: MEDICARE

## 2023-06-05 DIAGNOSIS — D73.89 SPLENIC LESION: ICD-10-CM

## 2023-06-05 PROCEDURE — 74177 CT ABD & PELVIS W/CONTRAST: CPT

## 2023-06-05 PROCEDURE — G1004 CDSM NDSC: HCPCS

## 2023-06-05 RX ADMIN — IOHEXOL 100 ML: 350 INJECTION, SOLUTION INTRAVENOUS at 14:17

## 2023-06-05 NOTE — LETTER
[address] [place] [address]  [address] [facility] [address]      June 13, 2023    MRN: [id number]     Phone: [phone]     Dear Mr Shay Saba recently had a(n) Cat Scan performed on 6/5/2023 at  [address] [place] [address] that was requested by Darcy Roberson MD  The study was reviewed by a radiologist, which is a physician who specializes in medical imaging  The radiologist issued a report describing his or her findings  In that report there was a finding that the radiologist felt warranted further discussion with your health care provider and that discussion would be beneficial to you  The results were sent to Darcy Roberson MD on 06/09/2023  8:01 AM  We recommend that you contact Darcy Roberson MD at [phone] or set up an appointment to discuss the results of the imaging test  If you have already heard from Darcy Roberson MD regarding the results of your study, you can disregard this letter  This letter is not meant to alarm you, but intended to encourage you to follow-up on your results with the provider that sent you for the imaging study  In addition, we have enclosed answers to frequently asked questions by other patients who have also received a letter to review results with their health care provider (see page two)  Thank you for choosing [facility] for your medical imaging needs  FREQUENTLY ASKED QUESTIONS    Why am I receiving this letter? [address] [place] [address] requires us to notify patients who have findings on imaging exams that may require more testing or follow-up with a health professional within the next 3 months  How serious is the finding on the imaging test?  This letter is sent to all patients who may need follow-up or more testing within the next 3 months  Receiving this letter does not necessarily mean you have a life-threatening imaging finding or disease  Recommendations in the radiologist’s imaging report are general in nature and it is up to your healthcare provider to say whether those recommendations make sense for your situation  You are strongly encouraged to talk to your health care provider about the results and ask whether additional steps need to be taken  Where can I get a copy of the final report for my recent radiology exam?  To get a full copy of the report you can access your records online at [email or web address] or please contact [facility] Medical Records Department at [phone] Monday through Friday between 8 am and 6 pm          What do I need to do now? Please contact your health care provider who requested the imaging study to discuss what further actions (if any) are needed  You may have already heard from (your ordering provider) in regard to this test in which case you can disregard this letter  NOTICE IN ACCORDANCE WITH THE [place] STATE “PATIENT TEST RESULT INFORMATION ACT OF 2018”    You are receiving this notice as a result of a determination by your diagnostic imaging service that further discussions of your test results are warranted and would be beneficial to you  The complete results of your test or tests have been or will be sent to the health care practitioner that ordered the test or tests  It is recommended that you contact your health care practitioner to discuss your results as soon as possible

## 2023-06-13 DIAGNOSIS — D73.89 SPLENIC LESION: Primary | ICD-10-CM

## 2023-06-14 NOTE — RESULT ENCOUNTER NOTE
Please call pt with abnormal results and schedule follow up  Need to see in the office  As long as he is asymptomatic from any pain in the left upper quadrant  Please order 6-month MRI as listed in this reading

## 2023-06-14 NOTE — RESULT ENCOUNTER NOTE
Please call pt with abnormal results and schedule follow up  Enck you for that reminder and you are correct  So 6 months

## 2023-06-16 DIAGNOSIS — D73.89 SPLENIC LESION: Primary | ICD-10-CM

## 2023-07-10 ENCOUNTER — TELEPHONE (OUTPATIENT)
Dept: CARDIOLOGY CLINIC | Facility: CLINIC | Age: 78
End: 2023-07-10

## 2023-07-10 NOTE — TELEPHONE ENCOUNTER
P/C has an appt tomorrow for a pacermaker check, [place].     P/C [phone]

## 2023-07-11 ENCOUNTER — REMOTE DEVICE CLINIC VISIT (OUTPATIENT)
Dept: CARDIOLOGY CLINIC | Facility: CLINIC | Age: 78
End: 2023-07-11
Payer: MEDICARE

## 2023-07-11 DIAGNOSIS — Z95.0 PRESENCE OF PERMANENT CARDIAC PACEMAKER: Primary | ICD-10-CM

## 2023-07-11 PROCEDURE — 93294 REM INTERROG EVL PM/LDLS PM: CPT | Performed by: INTERNAL MEDICINE

## 2023-07-11 PROCEDURE — 93296 REM INTERROG EVL PM/IDS: CPT | Performed by: INTERNAL MEDICINE

## 2023-07-12 NOTE — PROGRESS NOTES
Results for orders placed or performed in visit on 07/11/23   Cardiac EP device report    Narrative    SJM-DUAL CHAMBER PPM  (DDD MODE) - NOT MRI CONDITIONAL  [employer] TRANSMISSION: BATTERY VOLTAGE ADEQUATE (7.2-10.8 YRS). AP 23%  <1% ALL AVAILABLE LEAD PARAMETERS WITHIN NORMAL LIMITS. NO SIGNIFICANT HIGH RATE EPISODES. NORMAL DEVICE FUNCTION.  AM/CHRISTIAN

## 2023-07-24 DIAGNOSIS — E78.5 HYPERLIPIDEMIA: ICD-10-CM

## 2023-07-25 RX ORDER — ATORVASTATIN CALCIUM 20 MG/1
TABLET, FILM COATED ORAL
Qty: 90 TABLET | Refills: 3 | Status: SHIPPED | OUTPATIENT
Start: 2023-07-25

## 2023-09-01 ENCOUNTER — APPOINTMENT (OUTPATIENT)
Dept: LAB | Facility: HOSPITAL | Age: 78
End: 2023-09-01
Payer: MEDICARE

## 2023-09-01 DIAGNOSIS — C61 PROSTATE CANCER (HCC): ICD-10-CM

## 2023-09-01 LAB — PSA SERPL-MCNC: 0.15 NG/ML (ref 0–4)

## 2023-09-01 PROCEDURE — 84153 ASSAY OF PSA TOTAL: CPT

## 2023-09-01 PROCEDURE — 36415 COLL VENOUS BLD VENIPUNCTURE: CPT

## 2023-09-25 DIAGNOSIS — D73.89 SPLENIC LESION: Primary | ICD-10-CM

## 2023-10-10 ENCOUNTER — REMOTE DEVICE CLINIC VISIT (OUTPATIENT)
Dept: CARDIOLOGY CLINIC | Facility: CLINIC | Age: 78
End: 2023-10-10
Payer: MEDICARE

## 2023-10-10 DIAGNOSIS — Z95.0 PRESENCE OF PERMANENT CARDIAC PACEMAKER: Primary | ICD-10-CM

## 2023-10-10 PROCEDURE — 93296 REM INTERROG EVL PM/IDS: CPT | Performed by: INTERNAL MEDICINE

## 2023-10-10 PROCEDURE — 93294 REM INTERROG EVL PM/LDLS PM: CPT | Performed by: INTERNAL MEDICINE

## 2023-10-10 NOTE — PROGRESS NOTES
SJM DUAL CHAMBER PPM (DDD MODE) - NOT MRI CONDITIONAL   MERLIN TRANSMISSION:  BATTERY VOLTAGE ADEQUATE (7.1-10.4 YR.).  AP 26%  <1%.   ALL AVAILABLE LEAD PARAMETERS WITHIN NORMAL LIMITS.  1 HVR AND 90 NOISE REVERSION EPISODES WITH ALL AVAILABLE EGMS SHOWING MYPOTENTIAL OVERSENSING.  V LEAD IS UNIPOLAR (2 MV).  1 AMS EPISODE SHOWING BRIEF PAT.  NORMAL DEVICE FUNCTION.  RG

## 2023-10-18 ENCOUNTER — TELEPHONE (OUTPATIENT)
Dept: SURGERY | Facility: CLINIC | Age: 78
End: 2023-10-18

## 2023-10-18 NOTE — TELEPHONE ENCOUNTER
Patient called and would like a call back regarding "an important litigation matter." Patient would not state anything else. He asked for Dr Lurene Bernheim and the .

## 2023-10-23 ENCOUNTER — OFFICE VISIT (OUTPATIENT)
Dept: FAMILY MEDICINE CLINIC | Facility: CLINIC | Age: 78
End: 2023-10-23
Payer: MEDICARE

## 2023-10-23 ENCOUNTER — TELEPHONE (OUTPATIENT)
Dept: FAMILY MEDICINE CLINIC | Facility: CLINIC | Age: 78
End: 2023-10-23

## 2023-10-23 VITALS
HEART RATE: 50 BPM | OXYGEN SATURATION: 99 % | TEMPERATURE: 96.7 F | SYSTOLIC BLOOD PRESSURE: 120 MMHG | DIASTOLIC BLOOD PRESSURE: 70 MMHG | BODY MASS INDEX: 21.73 KG/M2 | HEIGHT: 68 IN | WEIGHT: 143.4 LBS

## 2023-10-23 DIAGNOSIS — D73.89 SPLENIC LESION: ICD-10-CM

## 2023-10-23 DIAGNOSIS — C61 PROSTATE CANCER (HCC): ICD-10-CM

## 2023-10-23 DIAGNOSIS — R93.5 ABNORMAL CT OF THE ABDOMEN: Chronic | ICD-10-CM

## 2023-10-23 DIAGNOSIS — Z01.818 PRE-OP EVALUATION: Chronic | ICD-10-CM

## 2023-10-23 DIAGNOSIS — Z95.0 PACEMAKER: ICD-10-CM

## 2023-10-23 DIAGNOSIS — E78.2 MIXED HYPERLIPIDEMIA: ICD-10-CM

## 2023-10-23 DIAGNOSIS — I49.5 SICK SINUS SYNDROME (HCC): Primary | ICD-10-CM

## 2023-10-23 PROBLEM — K40.90 NON-RECURRENT UNILATERAL INGUINAL HERNIA WITHOUT OBSTRUCTION OR GANGRENE: Status: RESOLVED | Noted: 2019-12-16 | Resolved: 2023-10-23

## 2023-10-23 PROBLEM — T82.110A FRACTURED ATRIAL PACEMAKER LEAD WIRE: Status: RESOLVED | Noted: 2022-10-18 | Resolved: 2023-10-23

## 2023-10-23 PROBLEM — Z45.010 PACEMAKER GENERATOR END OF LIFE: Status: RESOLVED | Noted: 2023-03-28 | Resolved: 2023-10-23

## 2023-10-23 PROBLEM — D72.819 LEUKOPENIA: Status: RESOLVED | Noted: 2020-09-29 | Resolved: 2023-10-23

## 2023-10-23 PROBLEM — K43.9 EPIGASTRIC HERNIA: Status: RESOLVED | Noted: 2022-08-26 | Resolved: 2023-10-23

## 2023-10-23 PROBLEM — R10.32 LEFT INGUINAL PAIN: Status: RESOLVED | Noted: 2021-12-29 | Resolved: 2023-10-23

## 2023-10-23 PROBLEM — J40 BRONCHITIS: Status: RESOLVED | Noted: 2023-01-03 | Resolved: 2023-10-23

## 2023-10-23 PROCEDURE — 93000 ELECTROCARDIOGRAM COMPLETE: CPT | Performed by: FAMILY MEDICINE

## 2023-10-23 PROCEDURE — 99214 OFFICE O/P EST MOD 30 MIN: CPT | Performed by: FAMILY MEDICINE

## 2023-10-23 PROCEDURE — G0439 PPPS, SUBSEQ VISIT: HCPCS | Performed by: FAMILY MEDICINE

## 2023-10-23 NOTE — LETTER
October 23, 2023     Shayne Mchugh, [address] [facility] [address]    Patient: Katharina Psot   YOB: 1945   Date of Visit: 10/23/2023     Dear Dr. Mau Almaraz      Thank you for referring Adelaide Taylor to me for evaluation. Below are the relevant portions of my assessment and plan of care. If you have questions, please do not hesitate to call me. I look forward to following Rich Sun along with you.          Sincerely,        Deedee Freitas MD        CC: No Recipients      Progress Notes:

## 2023-10-23 NOTE — PROGRESS NOTES
[facility] PRE-OPERATIVE EVALUATION  [facility] PHYSICIAN GROUP - [facility]    NAME: Jace Wilhelm  AGE: 77 y.o. SEX: male  : 1945     DATE: 10/23/2023    [facility] Pre-Operative Evaluation     Asked to evaluate by Dr. Jovon Mendoza for pre-op clearance. Chief Complaint: Pre-operative Evaluation     Surgery: repair ectropion   Anticipated Date of Surgery: 11/10/23     History of Present Illness:     Patient has no prior history of bleeding issues or blood clots. Chronic conditions, medications and allergies were reviewed. There is no currently active infectious process. Assessment of Cardiac Risk:  No unstable or severe angina or MI in the last 6 weeks or history of stent placement in the last year   No decompensated heart failure (e.g. New onset heart failure, NYHA functional class IV heart failure, or worsening existing heart failure) in past 3 mos. No severe heart valve disease including aortic stenosis or symptomatic mitral stenosis     Exercise Capacity:  Able to walk 4 blocks without symptoms  Able to walk 2 flights without symptoms    NO Prior Anesthesia Reactions       No prolonged steroid use in past 6 mos. P.A.T. Ordered this date : cbc/ bmp/EKG. Review of Systems:     Review of Systems   Constitutional:  Negative for activity change and appetite change. HENT:  Negative for trouble swallowing. Eyes:  Negative for visual disturbance. Respiratory:  Negative for cough and shortness of breath. Cardiovascular:  Negative for chest pain, palpitations and leg swelling. Gastrointestinal:  Negative for abdominal pain and blood in stool. Endocrine: Negative for polyuria. Genitourinary:  Negative for difficulty urinating and hematuria. Skin:  Negative for rash. Neurological:  Negative for dizziness. Psychiatric/Behavioral:  Negative for behavioral problems.         Current Problem List:     Patient Active Problem List   Diagnosis Prostate cancer [facility])    Sick sinus syndrome (HCC)    Mixed hyperlipidemia    Pacemaker    Splenic lesion    Abnormal CT of the abdomen    Narcolepsy    Pre-op evaluation       Allergies: Allergies   Allergen Reactions    Chlorhexidine Hives and Rash       Current Medications:       Current Outpatient Medications:     atorvastatin (LIPITOR) 20 mg tablet, TAKE 1 TABLET BY MOUTH  DAILY IN THE EARLY MORNING, Disp: 90 tablet, Rfl: 3    cholecalciferol (VITAMIN D3) 1,000 units tablet, Take 1,000 Units by mouth daily, Disp: , Rfl:     Cyanocobalamin (B-12) 1000 MCG CAPS, Take by mouth daily, Disp: , Rfl:     fluticasone (FLONASE) 50 mcg/act nasal spray, SPRAY 1 SPRAY INTO EACH NOSTRIL TWICE A DAY, Disp: 16 mL, Rfl: 5    ipratropium (ATROVENT) 0.06 % nasal spray, 2 sprays into each nostril 4 (four) times a day, Disp: 15 mL, Rfl: 5    mupirocin (BACTROBAN) 2 % ointment, Apply topically 3 (three) times a day, Disp: 22 g, Rfl: 0    Past Medical History:       Past Medical History:   Diagnosis Date    Cancer ([facility])     skin - L cheek carcinoma    Elevated PSA     Hyperlipidemia     Melanoma (HCC)     L cheek, surgically removed    Narcolepsy     Pacemaker 1988    Symptomatic Bradycardia    Prostate cancer ([facility])     Snoring         Past Surgical History:   Procedure Laterality Date    ABDOMINAL HERNIA REPAIR N/A 1/19/2023    Procedure: REPAIR HERNIA EPIGASTRIC OPEN;  Surgeon: Bam Pleitez MD;  Location: [facility] OR;  Service: General    ATRIAL CARDIAC PACEMAKER INSERTION      CARDIAC CATHETERIZATION  02/12/2015    CARDIAC ELECTROPHYSIOLOGY PROCEDURE N/A 3/28/2023    Procedure: Cardiac pacer generator change;  Surgeon: Negin Simmons DO;  Location:  CARDIAC CATH LAB;   Service: Cardiology    COLONOSCOPY      INSERTION OF FIDUCIAL MARKER (TRANSRECTAL ULTRASOUND GUIDANCE) N/A 2/3/2020    Procedure: INSERTION OF FIDUCIAL MARKER (TRANSRECTAL ULTRASOUND GUIDANCE) Ap Sites;  Surgeon: Lawson Rodríguez MD;  Location: [facility] OR;  Service: Urology    IR UPPER EXTREMITY VENOGRAM- DIAGNOSTIC  11/8/2022    MO RPR 1ST INGUN HRNA AGE 5 YRS/> REDUCIBLE Left 12/20/2019    Procedure: INGUINAL HERNIA REPAIR;  Surgeon: Lizett Zamora DO;  [facility];  Service: General    SKIN CANCER EXCISION      left cheek        Family History   Problem Relation Age of Onset    Coronary artery disease Mother     Coronary artery disease Father     Skin cancer Father     Hyperlipidemia Father     Cancer Father         Had bladder cancer    Coronary artery disease Brother         Social History     Socioeconomic History    Marital status: /Civil Union     Spouse name: Not on file    Number of children: 3    Years of education: Not on file    Highest education level: Not on file   Occupational History    Not on file   Tobacco Use    Smoking status: Never    Smokeless tobacco: Never   Vaping Use    Vaping Use: Never used   Substance and Sexual Activity    Alcohol use: Yes     Alcohol/week: 7.0 standard drinks of alcohol     Types: 7 Glasses of wine per week     Comment: 1 glass beer/wine daily    Drug use: Never    Sexual activity: Not Currently     Partners: Female     Comment: medication not effective since radiation. Has ED. Other Topics Concern    Not on file   Social History Narrative    Not on file     Social Determinants of Health     Financial Resource Strain: Low Risk  (10/18/2023)    Overall Financial Resource Strain (CARDIA)     Difficulty of Paying Living Expenses: Not hard at all   Food Insecurity: Not on file   Transportation Needs: No Transportation Needs (10/18/2023)    PRAPARE - Transportation     Lack of Transportation (Medical): No     Lack of Transportation (Non-Medical):  No   Physical Activity: Not on file   Stress: Not on file   Social Connections: Not on file   Intimate Partner Violence: Not on file   Housing Stability: Not on file        Physical Exam:     /70 (BP Location: Left arm, Patient Position: Sitting, Cuff Size: Standard)   Pulse (!) 50   Temp (!) 96.7 °F (35.9 °C) (Temporal)   Ht 5' 7.5" (1.715 m)   Wt 65 kg (143 lb 6.4 oz)   SpO2 99%   BMI 22.13 kg/m²     Physical Exam  Constitutional:       Appearance: Normal appearance. HENT:      Head: Normocephalic and atraumatic. Right Ear: Tympanic membrane and ear canal normal.      Left Ear: Tympanic membrane and ear canal normal.      Mouth/Throat:      Mouth: Mucous membranes are moist.      Pharynx: Oropharynx is clear. Eyes:      Conjunctiva/sclera: Conjunctivae normal.   Cardiovascular:      Rate and Rhythm: Normal rate and regular rhythm. Pulses: Normal pulses. Heart sounds: Normal heart sounds. No murmur heard. Pulmonary:      Effort: Pulmonary effort is normal. No respiratory distress. Breath sounds: Normal breath sounds. Musculoskeletal:      Cervical back: Neck supple. Lymphadenopathy:      Cervical: No cervical adenopathy. Neurological:      Mental Status: He is alert. Mental status is at baseline. Psychiatric:         Mood and Affect: Mood normal.         Thought Content: Thought content normal.         Operative site has been examined and clear of skin infection and inflammation. Assessment & Recommendations:     Patient is cleared for surgery as detailed above.      Surgical Procedure risk category: Low    Patient specific operative risk categegory: low

## 2023-10-23 NOTE — PROGRESS NOTES
Assessment and Plan:     Problem List Items Addressed This Visit       Prostate cancer ([address])     2.5 yrs post RT : PSA  low          Sick sinus syndrome ([address]) - Primary    Mixed hyperlipidemia    Pacemaker    Splenic lesion     See above : ct scheduled         Abnormal CT of the abdomen (Chronic)     CT repeat in December scheduled              Preventive health issues were discussed with patient, and age appropriate screening tests were ordered as noted in patient's After Visit Summary. Personalized health advice and appropriate referrals for health education or preventive services given if needed, as noted in patient's After Visit Summary. History of Present Illness:     Patient presents for a Medicare Wellness Visit    Pre-op as well        Patient Care Team:  Vidal Gan MD as PCP - General (Family Medicine)  Jett Romero MD (Radiation Oncology)  Nancy Terry MD (Urology)     Review of Systems:     Review of Systems     Problem List:     Patient Active Problem List   Diagnosis    Prostate cancer [facility])    Sick sinus syndrome [facility])    Mixed hyperlipidemia    Pacemaker    Splenic lesion    Abnormal CT of the abdomen    Narcolepsy      Past Medical and Surgical History:     Past Medical History:   Diagnosis Date    Cancer ([address])     skin - L cheek carcinoma    Elevated PSA     Hyperlipidemia     Melanoma ([address])     L cheek, surgically removed    Narcolepsy     Pacemaker 1988    Symptomatic Bradycardia    Prostate cancer ([address])     Snoring      Past Surgical History:   Procedure Laterality Date    ABDOMINAL HERNIA REPAIR N/A 1/19/2023    Procedure: REPAIR HERNIA EPIGASTRIC OPEN;  Surgeon: James Diaz MD;  [facility];  Service: General    ATRIAL CARDIAC PACEMAKER INSERTION      CARDIAC CATHETERIZATION  02/12/2015    CARDIAC ELECTROPHYSIOLOGY PROCEDURE N/A 3/28/2023    Procedure: Cardiac pacer generator change;  Surgeon: Virginia Rome DO;  [facility];   Service: Cardiology    COLONOSCOPY      INSERTION OF FIDUCIAL MARKER (TRANSRECTAL ULTRASOUND GUIDANCE) N/A 2/3/2020    Procedure: INSERTION OF FIDUCIAL MARKER (TRANSRECTAL ULTRASOUND GUIDANCE) SPACEOAR;  Surgeon: Sasha Simpson MD;  [facility]: [facility];  Service: Urology    IR UPPER EXTREMITY VENOGRAM- DIAGNOSTIC  11/8/2022    AL RPR 1ST INGUN HRNA AGE 5 YRS/> REDUCIBLE Left 12/20/2019    Procedure: INGUINAL HERNIA REPAIR;  Surgeon: Tiffanie Wright DO;  [facility];  Service: General    SKIN CANCER EXCISION      left cheek      Family History:     Family History   Problem Relation Age of Onset    Coronary artery disease Mother     Coronary artery disease Father     Skin cancer Father     Hyperlipidemia Father     Cancer Father         Had bladder cancer    Coronary artery disease Brother       Social History:     Social History     Socioeconomic History    Marital status: /Civil Union     Spouse name: None    Number of children: 3    Years of education: None    Highest education level: None   Occupational History    None   Tobacco Use    Smoking status: Never    Smokeless tobacco: Never   Vaping Use    Vaping Use: Never used   Substance and Sexual Activity    Alcohol use: Yes     Alcohol/week: 7.0 standard drinks of alcohol     Types: 7 Glasses of wine per week     Comment: 1 glass beer/wine daily    Drug use: Never    Sexual activity: Not Currently     Partners: Female     Comment: medication not effective since radiation. Has ED. Other Topics Concern    None   Social History Narrative    None     Social Determinants of Health     Financial Resource Strain: Low Risk  (10/18/2023)    Overall Financial Resource Strain (CARDIA)     Difficulty of Paying Living Expenses: Not hard at all   Food Insecurity: Not on file   Transportation Needs: No Transportation Needs (10/18/2023)    PRAPARE - Transportation     Lack of Transportation (Medical): No     Lack of Transportation (Non-Medical):  No   Physical Activity: Not on file   Stress: Not on file   Social Connections: Not on file   Intimate Partner Violence: Not on file   Housing Stability: Not on file      Medications and Allergies:     Current Outpatient Medications   Medication Sig Dispense Refill    atorvastatin (LIPITOR) 20 mg tablet TAKE 1 TABLET BY MOUTH  DAILY IN THE EARLY MORNING 90 tablet 3    cholecalciferol (VITAMIN D3) 1,000 units tablet Take 1,000 Units by mouth daily      Cyanocobalamin (B-12) 1000 MCG CAPS Take by mouth daily      fluticasone (FLONASE) 50 mcg/act nasal spray SPRAY 1 SPRAY INTO EACH NOSTRIL TWICE A DAY 16 mL 5    ipratropium (ATROVENT) 0.06 % nasal spray 2 sprays into each nostril 4 (four) times a day 15 mL 5    mupirocin (BACTROBAN) 2 % ointment Apply topically 3 (three) times a day 22 g 0     No current facility-administered medications for this visit.      Allergies   Allergen Reactions    Chlorhexidine Hives and Rash      Immunizations:     Immunization History   Administered Date(s) Administered    COVID-19 MODERNA VACC 0.5 ML IM 01/11/2021, 02/08/2021, 11/01/2021, 05/06/2022    H1N1, All Formulations 01/19/2010    Hep A, adult 03/06/2014    INFLUENZA 10/09/2014, 11/25/2016, 10/05/2018, 10/30/2019, 10/10/2020, 10/01/2021, 09/21/2022    Influenza Split High Dose Preservative Free IM 10/29/2015    Influenza, high dose seasonal 0.7 mL 10/30/2019, 09/21/2022    Pneumococcal Conjugate 13-Valent 09/12/2017, 12/12/2019    Pneumococcal Polysaccharide PPV23 01/19/2010, 02/29/2016, 04/08/2016    Zoster 01/25/2010    Zoster Vaccine Recombinant 06/01/2018, 06/14/2018, 08/27/2018, 08/27/2018      Health Maintenance:         Topic Date Due    Colorectal Cancer Screening  10/05/2031    Hepatitis C Screening  Completed         Topic Date Due    COVID-19 Vaccine (5 - Moderna series) 07/01/2022    Influenza Vaccine (1) 09/01/2023      Medicare Screening Tests and Risk Assessments:         Health Risk Assessment:   Patient rates overall health as very good. Patient feels that their physical health rating is same. Patient is satisfied with their life. Eyesight was rated as same. Hearing was rated as slightly worse. Patient feels that their emotional and mental health rating is same. Patients states they are sometimes angry. Patient states they are never, rarely unusually tired/fatigued. Pain experienced in the last 7 days has been none. Patient states that he has experienced no weight loss or gain in last 6 months. Fall Risk Screening: In the past year, patient has experienced: no history of falling in past year      Home Safety:  Patient does not have trouble with stairs inside or outside of their home. Patient has working smoke alarms and has working carbon monoxide detector. Home safety hazards include: none. Nutrition:   Current diet is Regular. Medications:   Patient is currently taking over-the-counter supplements. OTC medications include: see medication list. Patient is able to manage medications. Activities of Daily Living (ADLs)/Instrumental Activities of Daily Living (IADLs):   Walk and transfer into and out of bed and chair?: Yes  Dress and groom yourself?: Yes    Bathe or shower yourself?: Yes    Feed yourself? Yes  Do your laundry/housekeeping?: Yes  Manage your money, pay your bills and track your expenses?: Yes  Make your own meals?: Yes    Do your own shopping?: Yes    ADL comments: Eyes/ dental ok     Previous Hospitalizations:   Any hospitalizations or ED visits within the last 12 months?: Yes    How many hospitalizations have you had in the last year?: 3-4    Advance Care Planning:   Living will: Yes    Durable POA for healthcare:  Yes    Advanced directive: Yes      Cognitive Screening:   Provider or family/friend/caregiver concerned regarding cognition?: No    PREVENTIVE SCREENINGS      Cardiovascular Screening:    General: Screening Not Indicated and History Lipid Disorder      Diabetes Screening:     General: Screening Current      Colorectal Cancer Screening:     General: Screening Current      Prostate Cancer Screening:    General: History Prostate Cancer and Screening Not Indicated      Osteoporosis Screening:    General: Screening Not Indicated      Abdominal Aortic Aneurysm (AAA) Screening:        General: Screening Not Indicated      Lung Cancer Screening:     General: Screening Not Indicated      Hepatitis C Screening:    General: Screening Current    Screening, Brief Intervention, and Referral to Treatment (SBIRT)    Screening  Typical number of drinks in a day: 1  Typical number of drinks in a week: 7  Interpretation: Low risk drinking behavior. AUDIT-C Screenin) How often did you have a drink containing alcohol in the past year? 4 or more times a week  2) How many drinks did you have on a typical day when you were drinking in the past year? 1 to 2  3) How often did you have 6 or more drinks on one occasion in the past year? never    AUDIT-C Score: 4  Interpretation: Score 4-12 (male): POSITIVE screen for alcohol misuse    AUDIT Screenin) How often during the last year have you found that you were not able to stop drinking once you had started? 0 - never  5) How often during the last year have you failed to do what was normally expected from you because of drinking? 0 - never  6) How often during the last year have you needed a first drink in the morning to get yourself going after a heavy drinking session?  0 - never  7) How often during the last year have you had a feeling of guilt or remorse after drinking? 0 - never  8) How often during the last year have you been unable to remember what happened the night before because you had been drinking? 0 - never  9) Have you or someone else been injured as a result of your drinking? 0 - no  10) Has a relative or friend or a doctor or another health worker been concerned about your drinking or suggested you cut down? 0 - no    AUDIT Score: 4  Interpretation: Low risk alcohol consumption    Single Item Drug Screening:  How often have you used an illegal drug (including marijuana) or a prescription medication for non-medical reasons in the past year? never    Single Item Drug Screen Score: 0  Interpretation: Negative screen for possible drug use disorder    Brief Intervention  Alcohol & drug use screenings were reviewed. No concerns regarding substance use disorder identified. No results found.      Physical Exam:     /70 (BP Location: Left arm, Patient Position: Sitting, Cuff Size: Standard)   Pulse (!) 50   Temp (!) 96.7 °F (35.9 °C) (Temporal)   Ht 5' 7.5" (1.715 m)   Wt 65 kg (143 lb 6.4 oz)   SpO2 99%   BMI 22.13 kg/m²     Physical Exam     Santi Crenshaw MD

## 2023-10-23 NOTE — PROGRESS NOTES
[facility]AL[facility]  [facility] - [facility]    NAME: Hussein Gutierres  AGE: 77 y.o. SEX: male  : 1945     DATE: 10/23/2023    [facility] Pre-Operative Evaluation     Asked to evaluate by Dr. Misti Joel for pre-op clearance        Chief Complaint: Pre-operative Evaluation     Surgery: repair ectropion   Anticipated Date of Surgery: 11/10/23     History of Present Illness:     Patient has no prior history of bleeding issues or blood clots. Chronic conditions, medications and allergies were reviewed. There is no currently active infectious process. Assessment of Cardiac Risk:  No unstable or severe angina or MI in the last 6 weeks or history of stent placement in the last year   No decompensated heart failure (e.g. New onset heart failure, NYHA functional class IV heart failure, or worsening existing heart failure) in past 3 mos. No severe heart valve disease including aortic stenosis or symptomatic mitral stenosis     Exercise Capacity:  Able to walk 4 blocks without symptoms  Able to walk 2 flights without symptoms    NO Prior Anesthesia Reactions       No prolonged steroid use in past 6 mos. P.A.T. If done reviewed. Review of Systems:     Review of Systems    Current Problem List:     Patient Active Problem List   Diagnosis    Prostate cancer ([address])    Non-recurrent unilateral inguinal hernia without obstruction or gangrene    Leukopenia    Sick sinus syndrome ([address])    Mixed hyperlipidemia    Pacemaker    Left inguinal pain    Epigastric hernia    Splenic lesion    Abnormal CT of the abdomen    Fractured atrial pacemaker lead wire    Narcolepsy    Pacemaker generator end of life       Allergies:      Allergies   Allergen Reactions    Chlorhexidine Hives and Rash       Current Medications:       Current Outpatient Medications:     atorvastatin (LIPITOR) 20 mg tablet, TAKE 1 TABLET BY MOUTH  DAILY IN THE EARLY MORNING, Disp: 90 tablet, Rfl: 3    cholecalciferol (VITAMIN D3) 1,000 units tablet, Take 1,000 Units by mouth daily, Disp: , Rfl:     Cyanocobalamin (B-12) 1000 MCG CAPS, Take by mouth daily, Disp: , Rfl:     fluticasone (FLONASE) 50 mcg/act nasal spray, SPRAY 1 SPRAY INTO EACH NOSTRIL TWICE A DAY, Disp: 16 mL, Rfl: 5    ipratropium (ATROVENT) 0.06 % nasal spray, 2 sprays into each nostril 4 (four) times a day, Disp: 15 mL, Rfl: 5    mupirocin (BACTROBAN) 2 % ointment, Apply topically 3 (three) times a day, Disp: 22 g, Rfl: 0    Past Medical History:       Past Medical History:   Diagnosis Date    Cancer ([address] [facility])     skin - L cheek carcinoma    Elevated PSA     Hyperlipidemia     Melanoma ([address])     L cheek, surgically removed    Narcolepsy     Pacemaker 1988    Symptomatic Bradycardia    Prostate cancer ([address] [facility])     Snoring         Past Surgical History:   Procedure Laterality Date    ABDOMINAL HERNIA REPAIR N/A 1/19/2023    Procedure: REPAIR HERNIA EPIGASTRIC OPEN;  Surgeon: James Diaz MD;  [facility];  Service: General    ATRIAL CARDIAC PACEMAKER INSERTION      CARDIAC CATHETERIZATION  02/12/2015    CARDIAC ELECTROPHYSIOLOGY PROCEDURE N/A 3/28/2023    Procedure: Cardiac pacer generator change;  Surgeon: Virginia Rome DO;  [facility];   Service: Cardiology    COLONOSCOPY      INSERTION OF FIDUCIAL MARKER (TRANSRECTAL ULTRASOUND GUIDANCE) N/A 2/3/2020    Procedure: INSERTION OF FIDUCIAL MARKER (TRANSRECTAL ULTRASOUND GUIDANCE) Oliver Graves;  Surgeon: Ashu Kwong MD;  [facility];  Service: Urology    IR UPPER EXTREMITY VENOGRAM- DIAGNOSTIC  11/8/2022    [facility] RPR 1ST INGUN HRNA AGE 4 YRS/> REDUCIBLE Left 12/20/2019    Procedure: INGUINAL HERNIA REPAIR;  Surgeon: Dg Lloyd DO;  [facility];  Service: General    SKIN CANCER EXCISION      left cheek        Family History   Problem Relation Age of Onset    Coronary artery disease Mother     Coronary artery disease Father     Skin cancer Father     Hyperlipidemia Father     Cancer Father         Had bladder cancer    Coronary artery disease Brother         Social History     Socioeconomic History    Marital status: /Civil Union     Spouse name: Not on file    Number of children: 3    Years of education: Not on file    Highest education level: Not on file   Occupational History    Not on file   Tobacco Use    Smoking status: Never    Smokeless tobacco: Never   Vaping Use    Vaping Use: Never used   Substance and Sexual Activity    Alcohol use: Yes     Alcohol/week: 7.0 standard drinks of alcohol     Types: 7 Glasses of wine per week     Comment: 1 glass beer/wine daily    Drug use: Never    Sexual activity: Not Currently     Partners: Female     Comment: medication not effective since radiation. Has ED. Other Topics Concern    Not on file   Social History Narrative    Not on file     Social Determinants of Health     Financial Resource Strain: Low Risk  (10/18/2023)    Overall Financial Resource Strain (CARDIA)     Difficulty of Paying Living Expenses: Not hard at all   Food Insecurity: Not on file   Transportation Needs: No Transportation Needs (10/18/2023)    PRAPARE - Transportation     Lack of Transportation (Medical): No     Lack of Transportation (Non-Medical): No   Physical Activity: Not on file   Stress: Not on file   Social Connections: Not on file   Intimate Partner Violence: Not on file   Housing Stability: Not on file        Physical Exam:     /70 (BP Location: Left arm, Patient Position: Sitting, Cuff Size: Standard)   Pulse (!) 50   Temp (!) 96.7 °F (35.9 °C) (Temporal)   Ht 5' 7.5" (1.715 m)   Wt 65 kg (143 lb 6.4 oz)   SpO2 99%   BMI 22.13 kg/m²     Physical Exam    Operative site has been examined and clear of skin infection and inflammation. Assessment & Recommendations:     Patient is cleared for surgery as detailed above.      Surgical Procedure risk category: Low    Patient specific operative risk categegory: low

## 2023-10-23 NOTE — PATIENT INSTRUCTIONS
Medicare Preventive Visit Patient Instructions  Thank you for completing your Welcome to Medicare Visit or Medicare Annual Wellness Visit today. Your next wellness visit will be due in one year (10/23/2024). The screening/preventive services that you may require over the next 5-10 years are detailed below. Some tests may not apply to you based off risk factors and/or age. Screening tests ordered at today's visit but not completed yet may show as past due. Also, please note that scanned in results may not display below. Preventive Screenings:  Service Recommendations Previous Testing/Comments   Colorectal Cancer Screening  Colonoscopy    Fecal Occult Blood Test (FOBT)/Fecal Immunochemical Test (FIT)  Fecal DNA/Cologuard Test  Flexible Sigmoidoscopy Age: 43-73 years old   Colonoscopy: every 10 years (May be performed more frequently if at higher risk)  OR  FOBT/FIT: every 1 year  OR  Cologuard: every 3 years  OR  Sigmoidoscopy: every 5 years  Screening may be recommended earlier than age 39 if at higher risk for colorectal cancer. Also, an individualized decision between you and your healthcare provider will decide whether screening between the ages of 77-80 would be appropriate.  Colonoscopy: 10/05/2021  FOBT/FIT: Not on file  Cologuard: Not on file  Sigmoidoscopy: Not on file    Screening Current  Screening Current     Prostate Cancer Screening Individualized decision between patient and health care provider in men between ages of 53-66   Medicare will cover every 12 months beginning on the day after your 50th birthday PSA: 0.15 ng/mL     History Prostate Cancer  Screening Not Indicated  History Prostate Cancer  Screening Not Indicated     Hepatitis C Screening Once for adults born between 1945 and 1965  More frequently in patients at high risk for Hepatitis C Hep C Antibody: 02/06/2023    Screening Current  Screening Current   Diabetes Screening 1-2 times per year if you're at risk for diabetes or have pre-diabetes Fasting glucose: 97 mg/dL (3/28/2023)  A1C: No results in last 5 years (No results in last 5 years)  Screening Current  Screening Current   Cholesterol Screening Once every 5 years if you don't have a lipid disorder. May order more often based on risk factors. Lipid panel: 08/27/2022  Screening Not Indicated  History Lipid Disorder  Screening Not Indicated  History Lipid Disorder      Other Preventive Screenings Covered by Medicare:  Abdominal Aortic Aneurysm (AAA) Screening: covered once if your at risk. You're considered to be at risk if you have a family history of AAA or a male between the age of 70-76 who smoking at least 100 cigarettes in your lifetime. Lung Cancer Screening: covers low dose CT scan once per year if you meet all of the following conditions: (1) Age 48-67; (2) No signs or symptoms of lung cancer; (3) Current smoker or have quit smoking within the last 15 years; (4) You have a tobacco smoking history of at least 20 pack years (packs per day x number of years you smoked); (5) You get a written order from a healthcare provider. Glaucoma Screening: covered annually if you're considered high risk: (1) You have diabetes OR (2) Family history of glaucoma OR (3)  aged 48 and older OR (3)  American aged 72 and older  Osteoporosis Screening: covered every 2 years if you meet one of the following conditions: (1) Have a vertebral abnormality; (2) On glucocorticoid therapy for more than 3 months; (3) Have primary hyperparathyroidism; (4) On osteoporosis medications and need to assess response to drug therapy. HIV Screening: covered annually if you're between the age of 14-79. Also covered annually if you are younger than 13 and older than 72 with risk factors for HIV infection. For pregnant patients, it is covered up to 3 times per pregnancy.     Immunizations:  Immunization Recommendations   Influenza Vaccine Annual influenza vaccination during flu season is recommended for all persons aged >= 6 months who do not have contraindications   Pneumococcal Vaccine   * Pneumococcal conjugate vaccine = PCV13 (Prevnar 13), PCV15 (Vaxneuvance), PCV20 (Prevnar 20)  * Pneumococcal polysaccharide vaccine = PPSV23 (Pneumovax) Adults 08-68 yo with certain risk factors or if 69+ yo  If never received any pneumonia vaccine: recommend Prevnar 20 (PCV20)  Give PCV20 if previously received 1 dose of PCV13 or PPSV23   Hepatitis B Vaccine 3 dose series if at intermediate or high risk (ex: diabetes, end stage renal disease, liver disease)   Respiratory syncytial virus (RSV) Vaccine - COVERED BY MEDICARE PART D  * RSVPreF3 (Arexvy) CDC recommends that adults 61years of age and older may receive a single dose of RSV vaccine using shared clinical decision-making (SCDM)   Tetanus (Td) Vaccine - COST NOT COVERED BY MEDICARE PART B Following completion of primary series, a booster dose should be given every 10 years to maintain immunity against tetanus. Td may also be given as tetanus wound prophylaxis. Tdap Vaccine - COST NOT COVERED BY MEDICARE PART B Recommended at least once for all adults. For pregnant patients, recommended with each pregnancy. Shingles Vaccine (Shingrix) - COST NOT COVERED BY MEDICARE PART B  2 shot series recommended in those 19 years and older who have or will have weakened immune systems or those 50 years and older     Health Maintenance Due:      Topic Date Due   • Colorectal Cancer Screening  10/05/2031   • Hepatitis C Screening  Completed     Immunizations Due:      Topic Date Due   • COVID-19 Vaccine (5 - Moderna series) 07/01/2022   • Influenza Vaccine (1) 09/01/2023     Advance Directives   What are advance directives? Advance directives are legal documents that state your wishes and plans for medical care. These plans are made ahead of time in case you lose your ability to make decisions for yourself.  Advance directives can apply to any medical decision, such as the treatments you want, and if you want to donate organs. What are the types of advance directives? There are many types of advance directives, and each state has rules about how to use them. You may choose a combination of any of the following:  Living will: This is a written record of the treatment you want. You can also choose which treatments you do not want, which to limit, and which to stop at a certain time. This includes surgery, medicine, IV fluid, and tube feedings. Durable power of  for [facility]): This is a written record that states who you want to make healthcare choices for you when you are unable to make them for yourself. This person, called a proxy, is usually a family member or a friend. You may choose more than 1 proxy. Do not resuscitate (DNR) order:  A DNR order is used in case your heart stops beating or you stop breathing. It is a request not to have certain forms of treatment, such as CPR. A DNR order may be included in other types of advance directives. Medical directive: This covers the care that you want if you are in a coma, near death, or unable to make decisions for yourself. You can list the treatments you want for each condition. Treatment may include pain medicine, surgery, blood transfusions, dialysis, IV or tube feedings, and a ventilator (breathing machine). Values history: This document has questions about your views, beliefs, and how you feel and think about life. This information can help others choose the care that you would choose. Why are advance directives important? An advance directive helps you control your care. Although spoken wishes may be used, it is better to have your wishes written down. Spoken wishes can be misunderstood, or not followed. Treatments may be given even if you do not want them. An advance directive may make it easier for your family to make difficult choices about your care.    Alcohol Use and Your Health    Drinking too much can harm your health. Excessive alcohol use leads to about 88,000 death in [facility] each year, and shortens the life of those who diet by almost 30 years. Further, excessive drinking cost the economy $249 billion in 2010. Most excessive drinkers are not alcohol dependent. Excessive alcohol use has immediate effects that increase the risk of many harmful health conditions. These are most often the result of binge drinking. Over time, excessive alcohol use can lead to the development of chronic diseases and other series health problems. What is considered a "drink"? Excessive alcohol use includes:  Binge Drinking: For women, 4 or more drinks consumed on one occasion. For men, 5 or more drinks consumed on one occasion. Heavy Drinking: For women, 8 or more drinks per week. For men, 15 or more drinks per week  Any alcohol used by pregnant women  Any alcohol used by those under the age of 21 years    If you choose to drink, do so in moderation:  Do not drink at all if you are under the age of 24, or if you are or may be pregnant, or have health problems that could be made worse by drinking.   For women, up to 1 drink per day  For men, up to 2 drinks a day    No one should begin drinking or drink more frequently based on potential health benefits    Short-Term Health Risks:  Injuries: motor vehicle crashes, falls, drownings, burns  Violence: homicide, suicide, sexual assault, intimate partner violence  Alcohol poisoning  Reproductive health: risky sexual behaviors, unintended prengnacy, sexually transmitted diseases, miscarriage, stillbirth, fetal alcohol syndrome    Long-Term Health Risks:  Chronic diseases: high blood pressure, heart disease, stroke, liver disease, digestive problems  Cancers: breast, mouth and throat, liver, colon  Learning and memory problems: dementia, poor school performance  Mental health: depression, anxiety, insomnia  Social problems: lost productivity, family problems, unemployment  Alcohol dependence    For support and more information:  Substance Abuse and [address] , [address] [place] [address]  Web Address: https://[employer]/    Alcoholics Anonymous        Web Address: http://www.torres.info/    https://www.cdc.gov/alcohol/fact-sheets/alcohol-use.htm     © [employer] 2018 Information is for End User's use only and may not be sold, redistributed or otherwise used for commercial purposes. All illustrations and images included in CareNotes® are the copyrighted property of [employer]. or [facility] Preventive Visit Patient Instructions  Thank you for completing your Welcome to Medicare Visit or Medicare Annual Wellness Visit today. Your next wellness visit will be due in one year (10/23/2024). The screening/preventive services that you may require over the next 5-10 years are detailed below. Some tests may not apply to you based off risk factors and/or age. Screening tests ordered at today's visit but not completed yet may show as past due. Also, please note that scanned in results may not display below. Preventive Screenings:  Service Recommendations Previous Testing/Comments   Colorectal Cancer Screening  Colonoscopy    Fecal Occult Blood Test (FOBT)/Fecal Immunochemical Test (FIT)  Fecal DNA/Cologuard Test  Flexible Sigmoidoscopy Age: 43-73 years old   Colonoscopy: every 10 years (May be performed more frequently if at higher risk)  OR  FOBT/FIT: every 1 year  OR  Cologuard: every 3 years  OR  Sigmoidoscopy: every 5 years  Screening may be recommended earlier than age 39 if at higher risk for colorectal cancer. Also, an individualized decision between you and your healthcare provider will decide whether screening between the ages of 77-80 would be appropriate.  Colonoscopy: 10/05/2021  FOBT/FIT: Not on file  Cologuard: Not on file  Sigmoidoscopy: Not on file    Screening Current  Screening Current     Prostate Cancer Screening Individualized decision between patient and health care provider in men between ages of 53-66   Medicare will cover every 12 months beginning on the day after your 50th birthday PSA: 0.15 ng/mL     History Prostate Cancer  Screening Not Indicated  History Prostate Cancer  Screening Not Indicated     Hepatitis C Screening Once for adults born between 1945 and 1965  More frequently in patients at high risk for Hepatitis C Hep C Antibody: 02/06/2023    Screening Current  Screening Current   Diabetes Screening 1-2 times per year if you're at risk for diabetes or have pre-diabetes Fasting glucose: 97 mg/dL (3/28/2023)  A1C: No results in last 5 years (No results in last 5 years)  Screening Current  Screening Current   Cholesterol Screening Once every 5 years if you don't have a lipid disorder. May order more often based on risk factors. Lipid panel: 08/27/2022  Screening Not Indicated  History Lipid Disorder  Screening Not Indicated  History Lipid Disorder      Other Preventive Screenings Covered by Medicare:  Abdominal Aortic Aneurysm (AAA) Screening: covered once if your at risk. You're considered to be at risk if you have a family history of AAA or a male between the age of 70-76 who smoking at least 100 cigarettes in your lifetime. Lung Cancer Screening: covers low dose CT scan once per year if you meet all of the following conditions: (1) Age 48-67; (2) No signs or symptoms of lung cancer; (3) Current smoker or have quit smoking within the last 15 years; (4) You have a tobacco smoking history of at least 20 pack years (packs per day x number of years you smoked); (5) You get a written order from a healthcare provider.   Glaucoma Screening: covered annually if you're considered high risk: (1) You have diabetes OR (2) Family history of glaucoma OR (3)  aged 48 and older OR (3)  American aged 72 and older  Osteoporosis Screening: covered every 2 years if you meet one of the following conditions: (1) Have a vertebral abnormality; (2) On glucocorticoid therapy for more than 3 months; (3) Have primary hyperparathyroidism; (4) On osteoporosis medications and need to assess response to drug therapy. HIV Screening: covered annually if you're between the age of 14-79. Also covered annually if you are younger than 13 and older than 72 with risk factors for HIV infection. For pregnant patients, it is covered up to 3 times per pregnancy. Immunizations:  Immunization Recommendations   Influenza Vaccine Annual influenza vaccination during flu season is recommended for all persons aged >= 6 months who do not have contraindications   Pneumococcal Vaccine   * Pneumococcal conjugate vaccine = PCV13 (Prevnar 13), PCV15 (Vaxneuvance), PCV20 (Prevnar 20)  * Pneumococcal polysaccharide vaccine = PPSV23 (Pneumovax) Adults 57-59 yo with certain risk factors or if 69+ yo  If never received any pneumonia vaccine: recommend Prevnar 20 (PCV20)  Give PCV20 if previously received 1 dose of PCV13 or PPSV23   Hepatitis B Vaccine 3 dose series if at intermediate or high risk (ex: diabetes, end stage renal disease, liver disease)   Respiratory syncytial virus (RSV) Vaccine - COVERED BY MEDICARE PART D  * RSVPreF3 (Arexvy) CDC recommends that adults 61years of age and older may receive a single dose of RSV vaccine using shared clinical decision-making (SCDM)   Tetanus (Td) Vaccine - COST NOT COVERED BY MEDICARE PART B Following completion of primary series, a booster dose should be given every 10 years to maintain immunity against tetanus. Td may also be given as tetanus wound prophylaxis. Tdap Vaccine - COST NOT COVERED BY MEDICARE PART B Recommended at least once for all adults. For pregnant patients, recommended with each pregnancy.    Shingles Vaccine (Shingrix) - COST NOT COVERED BY MEDICARE PART B  2 shot series recommended in those 19 years and older who have or will have weakened immune systems or those 50 years and older     Health Maintenance Due:      Topic Date Due   • Colorectal Cancer Screening  10/05/2031   • Hepatitis C Screening  Completed     Immunizations Due:      Topic Date Due   • COVID-19 Vaccine (5 - Moderna series) 07/01/2022   • Influenza Vaccine (1) 09/01/2023     Advance Directives   What are advance directives? Advance directives are legal documents that state your wishes and plans for medical care. These plans are made ahead of time in case you lose your ability to make decisions for yourself. Advance directives can apply to any medical decision, such as the treatments you want, and if you want to donate organs. What are the types of advance directives? There are many types of advance directives, and each state has rules about how to use them. You may choose a combination of any of the following:  Living will: This is a written record of the treatment you want. You can also choose which treatments you do not want, which to limit, and which to stop at a certain time. This includes surgery, medicine, IV fluid, and tube feedings. Durable power of  for healthcare [facility]): This is a written record that states who you want to make healthcare choices for you when you are unable to make them for yourself. This person, called a proxy, is usually a family member or a friend. You may choose more than 1 proxy. Do not resuscitate (DNR) order:  A DNR order is used in case your heart stops beating or you stop breathing. It is a request not to have certain forms of treatment, such as CPR. A DNR order may be included in other types of advance directives. Medical directive: This covers the care that you want if you are in a coma, near death, or unable to make decisions for yourself. You can list the treatments you want for each condition.  Treatment may include pain medicine, surgery, blood transfusions, dialysis, IV or tube feedings, and a ventilator (breathing machine). Values history: This document has questions about your views, beliefs, and how you feel and think about life. This information can help others choose the care that you would choose. Why are advance directives important? An advance directive helps you control your care. Although spoken wishes may be used, it is better to have your wishes written down. Spoken wishes can be misunderstood, or not followed. Treatments may be given even if you do not want them. An advance directive may make it easier for your family to make difficult choices about your care. Alcohol Use and Your Health    Drinking too much can harm your health. Excessive alcohol use leads to about 88,000 death in [facility] each year, and shortens the life of those who diet by almost 30 years. Further, excessive drinking cost the economy $249 billion in 2010. Most excessive drinkers are not alcohol dependent. Excessive alcohol use has immediate effects that increase the risk of many harmful health conditions. These are most often the result of binge drinking. Over time, excessive alcohol use can lead to the development of chronic diseases and other series health problems. What is considered a "drink"? Excessive alcohol use includes:  Binge Drinking: For women, 4 or more drinks consumed on one occasion. For men, 5 or more drinks consumed on one occasion. Heavy Drinking: For women, 8 or more drinks per week. For men, 15 or more drinks per week  Any alcohol used by pregnant women  Any alcohol used by those under the age of 21 years    If you choose to drink, do so in moderation:  Do not drink at all if you are under the age of 24, or if you are or may be pregnant, or have health problems that could be made worse by drinking.   For women, up to 1 drink per day  For men, up to 2 drinks a day    No one should begin drinking or drink more frequently based on potential health benefits    Short-Term Health Risks:  Injuries: motor vehicle crashes, falls, drownings, burns  Violence: homicide, suicide, sexual assault, intimate partner violence  Alcohol poisoning  Reproductive health: risky sexual behaviors, unintended prengnacy, sexually transmitted diseases, miscarriage, stillbirth, fetal alcohol syndrome    Long-Term Health Risks:  Chronic diseases: high blood pressure, heart disease, stroke, liver disease, digestive problems  Cancers: breast, mouth and throat, liver, colon  Learning and memory problems: dementia, poor school performance  Mental health: depression, anxiety, insomnia  Social problems: lost productivity, family problems, unemployment  Alcohol dependence    For support and more information:  Substance Abuse and [address]  Web Address: https://[employer]/    Alcoholics Anonymous        Web Address: http://www.[employer].info/    https://www.cdc.gov/alcohol/fact-sheets/alcohol-use.htm     © Copyright [employer] 2018 Information is for End User's use only and may not be sold, redistributed or otherwise used for commercial purposes.  All illustrations and images included in CareNotes® are the copyrighted property of [employer] or [address] [place] [address]

## 2023-10-23 NOTE — PROGRESS NOTES
[facility] PRE-OPERATIVE EVALUATION  [facility] PHYSICIAN GROUP - [facility]    NAME: Boone Contreras  AGE: 77 y.o. SEX: male  : 1945     DATE: 10/23/2023    [facility] Pre-Operative Evaluation     Asked to evaluate by Dr. Pauline Arellano for pre-op clearance. Chief Complaint: Pre-operative Evaluation     Surgery: repair ectropion   Anticipated Date of Surgery: 11/10/23     History of Present Illness:     Patient has no prior history of bleeding issues or blood clots. Chronic conditions, medications and allergies were reviewed. There is no currently active infectious process. Assessment of Cardiac Risk:  No unstable or severe angina or MI in the last 6 weeks or history of stent placement in the last year   No decompensated heart failure (e.g. New onset heart failure, NYHA functional class IV heart failure, or worsening existing heart failure) in past 3 mos. No severe heart valve disease including aortic stenosis or symptomatic mitral stenosis     Exercise Capacity:  Able to walk 4 blocks without symptoms  Able to walk 2 flights without symptoms    NO Prior Anesthesia Reactions       No prolonged steroid use in past 6 mos. P.A.T. Ordered this date : cbc/ bmp/EKG. Review of Systems:     Review of Systems   Constitutional:  Negative for activity change and appetite change. HENT:  Negative for trouble swallowing. Eyes:  Negative for visual disturbance. Respiratory:  Negative for cough and shortness of breath. Cardiovascular:  Negative for chest pain, palpitations and leg swelling. Gastrointestinal:  Negative for abdominal pain and blood in stool. Endocrine: Negative for polyuria. Genitourinary:  Negative for difficulty urinating and hematuria. Skin:  Negative for rash. Neurological:  Negative for dizziness. Psychiatric/Behavioral:  Negative for behavioral problems.         Current Problem List:     Patient Active Problem List   Diagnosis Prostate cancer [facility])    Sick sinus syndrome (HCC)    Mixed hyperlipidemia    Pacemaker    Splenic lesion    Abnormal CT of the abdomen    Narcolepsy    Pre-op evaluation       Allergies: Allergies   Allergen Reactions    Chlorhexidine Hives and Rash       Current Medications:       Current Outpatient Medications:     atorvastatin (LIPITOR) 20 mg tablet, TAKE 1 TABLET BY MOUTH  DAILY IN THE EARLY MORNING, Disp: 90 tablet, Rfl: 3    cholecalciferol (VITAMIN D3) 1,000 units tablet, Take 1,000 Units by mouth daily, Disp: , Rfl:     Cyanocobalamin (B-12) 1000 MCG CAPS, Take by mouth daily, Disp: , Rfl:     fluticasone (FLONASE) 50 mcg/act nasal spray, SPRAY 1 SPRAY INTO EACH NOSTRIL TWICE A DAY, Disp: 16 mL, Rfl: 5    ipratropium (ATROVENT) 0.06 % nasal spray, 2 sprays into each nostril 4 (four) times a day, Disp: 15 mL, Rfl: 5    mupirocin (BACTROBAN) 2 % ointment, Apply topically 3 (three) times a day, Disp: 22 g, Rfl: 0    Past Medical History:       Past Medical History:   Diagnosis Date    Cancer ([facility])     skin - L cheek carcinoma    Elevated PSA     Hyperlipidemia     Melanoma (HCC)     L cheek, surgically removed    Narcolepsy     Pacemaker 1988    Symptomatic Bradycardia    Prostate cancer ([facility])     Snoring         Past Surgical History:   Procedure Laterality Date    ABDOMINAL HERNIA REPAIR N/A 1/19/2023    Procedure: REPAIR HERNIA EPIGASTRIC OPEN;  Surgeon: Sruthi Jeffries MD;  Location: [facility] OR;  Service: General    ATRIAL CARDIAC PACEMAKER INSERTION      CARDIAC CATHETERIZATION  02/12/2015    CARDIAC ELECTROPHYSIOLOGY PROCEDURE N/A 3/28/2023    Procedure: Cardiac pacer generator change;  Surgeon: Demetri Romero DO;  Location:  CARDIAC CATH LAB;   Service: Cardiology    COLONOSCOPY      INSERTION OF FIDUCIAL MARKER (TRANSRECTAL ULTRASOUND GUIDANCE) N/A 2/3/2020    Procedure: INSERTION OF FIDUCIAL MARKER (TRANSRECTAL ULTRASOUND GUIDANCE) Carlos Man;  Surgeon: Ava Holland MD;  Location: [facility] OR;  Service: Urology    IR UPPER EXTREMITY VENOGRAM- DIAGNOSTIC  11/8/2022    IA RPR 1ST INGUN HRNA AGE 5 YRS/> REDUCIBLE Left 12/20/2019    Procedure: INGUINAL HERNIA REPAIR;  Surgeon: Mook Eagle DO;  [facility];  Service: General    SKIN CANCER EXCISION      left cheek        Family History   Problem Relation Age of Onset    Coronary artery disease Mother     Coronary artery disease Father     Skin cancer Father     Hyperlipidemia Father     Cancer Father         Had bladder cancer    Coronary artery disease Brother         Social History     Socioeconomic History    Marital status: /Civil Union     Spouse name: Not on file    Number of children: 3    Years of education: Not on file    Highest education level: Not on file   Occupational History    Not on file   Tobacco Use    Smoking status: Never    Smokeless tobacco: Never   Vaping Use    Vaping Use: Never used   Substance and Sexual Activity    Alcohol use: Yes     Alcohol/week: 7.0 standard drinks of alcohol     Types: 7 Glasses of wine per week     Comment: 1 glass beer/wine daily    Drug use: Never    Sexual activity: Not Currently     Partners: Female     Comment: medication not effective since radiation. Has ED. Other Topics Concern    Not on file   Social History Narrative    Not on file     Social Determinants of Health     Financial Resource Strain: Low Risk  (10/18/2023)    Overall Financial Resource Strain (CARDIA)     Difficulty of Paying Living Expenses: Not hard at all   Food Insecurity: Not on file   Transportation Needs: No Transportation Needs (10/18/2023)    PRAPARE - Transportation     Lack of Transportation (Medical): No     Lack of Transportation (Non-Medical):  No   Physical Activity: Not on file   Stress: Not on file   Social Connections: Not on file   Intimate Partner Violence: Not on file   Housing Stability: Not on file        Physical Exam:     /70 (BP Location: Left arm, Patient Position: Sitting, Cuff Size: Standard)   Pulse (!) 50   Temp (!) 96.7 °F (35.9 °C) (Temporal)   Ht 5' 7.5" (1.715 m)   Wt 65 kg (143 lb 6.4 oz)   SpO2 99%   BMI 22.13 kg/m²     Physical Exam  Constitutional:       Appearance: Normal appearance. HENT:      Head: Normocephalic and atraumatic. Right Ear: Tympanic membrane and ear canal normal.      Left Ear: Tympanic membrane and ear canal normal.      Mouth/Throat:      Mouth: Mucous membranes are moist.      Pharynx: Oropharynx is clear. Eyes:      Conjunctiva/sclera: Conjunctivae normal.   Cardiovascular:      Rate and Rhythm: Normal rate and regular rhythm. Pulses: Normal pulses. Heart sounds: Normal heart sounds. No murmur heard. Pulmonary:      Effort: Pulmonary effort is normal. No respiratory distress. Breath sounds: Normal breath sounds. Musculoskeletal:      Cervical back: Neck supple. Lymphadenopathy:      Cervical: No cervical adenopathy. Neurological:      Mental Status: He is alert. Mental status is at baseline. Psychiatric:         Mood and Affect: Mood normal.         Thought Content: Thought content normal.         Operative site has been examined and clear of skin infection and inflammation. Assessment & Recommendations:     Patient is cleared for surgery as detailed above.      Surgical Procedure risk category: Low    Patient specific operative risk categegory: low

## 2023-10-23 NOTE — PROGRESS NOTES
Assessment and Plan:     Problem List Items Addressed This Visit    None       Preventive health issues were discussed with patient, and age appropriate screening tests were ordered as noted in patient's After Visit Summary. Personalized health advice and appropriate referrals for health education or preventive services given if needed, as noted in patient's After Visit Summary. History of Present Illness:     Patient presents for a Medicare Wellness Visit    HPI   Patient Care Team:  Nash Moore MD as PCP - General (Family Medicine)  Tahira Loja MD (Radiation Oncology)  Jose Mayorga MD (Urology)     Review of Systems:     Review of Systems     Problem List:     Patient Active Problem List   Diagnosis    Prostate cancer [facility])    Non-recurrent unilateral inguinal hernia without obstruction or gangrene    Leukopenia    Sick sinus syndrome ([address])    Mixed hyperlipidemia    Pacemaker    Left inguinal pain    Epigastric hernia    Splenic lesion    Abnormal CT of the abdomen    Fractured atrial pacemaker lead wire    Bronchitis    Narcolepsy    Pacemaker generator end of life      Past Medical and Surgical History:     Past Medical History:   Diagnosis Date    Cancer ([address])     skin - L cheek carcinoma    Elevated PSA     Hyperlipidemia     Melanoma ([address])     L cheek, surgically removed    Narcolepsy     Pacemaker 1988    Symptomatic Bradycardia    Prostate cancer ([address])     Snoring      Past Surgical History:   Procedure Laterality Date    ABDOMINAL HERNIA REPAIR N/A 1/19/2023    Procedure: REPAIR HERNIA EPIGASTRIC OPEN;  Surgeon: Marcelino Caputo MD;  Location: [facility];  Service: General    ATRIAL CARDIAC PACEMAKER INSERTION      CARDIAC CATHETERIZATION  02/12/2015    CARDIAC ELECTROPHYSIOLOGY PROCEDURE N/A 3/28/2023    Procedure: Cardiac pacer generator change;  Surgeon: Davis Whitehead DO;  Location: [facility] CARDIAC CATH LAB;   Service: Cardiology    COLONOSCOPY      INSERTION OF FIDUCIAL MARKER (TRANSRECTAL ULTRASOUND GUIDANCE) N/A 2/3/2020    Procedure: INSERTION OF FIDUCIAL MARKER (TRANSRECTAL ULTRASOUND GUIDANCE) SPACEOAR;  Surgeon: Gilford Bott, MD;  [facility];  Service: Urology    IR UPPER EXTREMITY VENOGRAM- DIAGNOSTIC  11/8/2022    [place] RPR 1ST INGUN HRNA AGE 5 YRS/> REDUCIBLE Left 12/20/2019    Procedure: INGUINAL HERNIA REPAIR;  Surgeon: Randolph Gutierrez DO;  Location: [facility];  Service: General    SKIN CANCER EXCISION      left cheek      Family History:     Family History   Problem Relation Age of Onset    Coronary artery disease Mother     Coronary artery disease Father     Skin cancer Father     Hyperlipidemia Father     Cancer Father         Had bladder cancer    Coronary artery disease Brother       Social History:     Social History     Socioeconomic History    Marital status: /Civil Union     Spouse name: None    Number of children: 3    Years of education: None    Highest education level: None   Occupational History    None   Tobacco Use    Smoking status: Never    Smokeless tobacco: Never   Vaping Use    Vaping Use: Never used   Substance and Sexual Activity    Alcohol use: Yes     Alcohol/week: 7.0 standard drinks of alcohol     Types: 7 Glasses of wine per week     Comment: 1 glass beer/wine daily    Drug use: Never    Sexual activity: Not Currently     Partners: Female     Comment: medication not effective since radiation. Has ED. Other Topics Concern    None   Social History Narrative    None     Social Determinants of Health     Financial Resource Strain: Low Risk  (10/18/2023)    Overall Financial Resource Strain (CARDIA)     Difficulty of Paying Living Expenses: Not hard at all   Food Insecurity: Not on file   Transportation Needs: No Transportation Needs (10/18/2023)    PRAPARE - Transportation     Lack of Transportation (Medical): No     Lack of Transportation (Non-Medical):  No   Physical Activity: Not on file   Stress: Not on file   Social Connections: Not on file   Intimate Partner Violence: Not on file   Housing Stability: Not on file      Medications and Allergies:     Current Outpatient Medications   Medication Sig Dispense Refill    atorvastatin (LIPITOR) 20 mg tablet TAKE 1 TABLET BY MOUTH  DAILY IN THE EARLY MORNING 90 tablet 3    cholecalciferol (VITAMIN D3) 1,000 units tablet Take 1,000 Units by mouth daily      Cyanocobalamin (B-12) 1000 MCG CAPS Take by mouth daily      fluticasone (FLONASE) 50 mcg/act nasal spray SPRAY 1 SPRAY INTO EACH NOSTRIL TWICE A DAY 16 mL 5    ipratropium (ATROVENT) 0.06 % nasal spray 2 sprays into each nostril 4 (four) times a day 15 mL 5    mupirocin (BACTROBAN) 2 % ointment Apply topically 3 (three) times a day 22 g 0     No current facility-administered medications for this visit. Allergies   Allergen Reactions    Chlorhexidine Hives and Rash      Immunizations:     Immunization History   Administered Date(s) Administered    COVID-19 MODERNA VACC 0.5 ML IM 01/11/2021, 02/08/2021, 11/01/2021, 05/06/2022    H1N1, All Formulations 01/19/2010    Hep A, adult 03/06/2014    INFLUENZA 10/09/2014, 11/25/2016, 10/05/2018, 10/30/2019, 10/10/2020, 10/01/2021, 09/21/2022    Influenza Split High Dose Preservative Free IM 10/29/2015    Influenza, high dose seasonal 0.7 mL 10/30/2019, 09/21/2022    Pneumococcal Conjugate 13-Valent 09/12/2017, 12/12/2019    Pneumococcal Polysaccharide PPV23 01/19/2010, 02/29/2016, 04/08/2016    Zoster 01/25/2010    Zoster Vaccine Recombinant 06/01/2018, 06/14/2018, 08/27/2018, 08/27/2018      Health Maintenance:         Topic Date Due    Colorectal Cancer Screening  10/05/2031    Hepatitis C Screening  Completed         Topic Date Due    COVID-19 Vaccine (5 - Moderna series) 07/01/2022    Influenza Vaccine (1) 09/01/2023      Medicare Screening Tests and Risk Assessments:     Benji Koch is here for his Subsequent Wellness visit. Health Risk Assessment:   Patient rates overall health as very good. Patient feels that their physical health rating is same. Patient is satisfied with their life. Eyesight was rated as same. Hearing was rated as slightly worse. Patient feels that their emotional and mental health rating is same. Patients states they are sometimes angry. Patient states they are never, rarely unusually tired/fatigued. Pain experienced in the last 7 days has been none. Patient states that he has experienced no weight loss or gain in last 6 months. Fall Risk Screening: In the past year, patient has experienced: no history of falling in past year      Home Safety:  Patient does not have trouble with stairs inside or outside of their home. Patient has working smoke alarms and has working carbon monoxide detector. Home safety hazards include: none. Nutrition:   Current diet is Regular. Medications:   Patient is currently taking over-the-counter supplements. OTC medications include: see medication list. Patient is able to manage medications. Activities of Daily Living (ADLs)/Instrumental Activities of Daily Living (IADLs):   Walk and transfer into and out of bed and chair?: Yes  Dress and groom yourself?: Yes    Bathe or shower yourself?: Yes    Feed yourself? Yes  Do your laundry/housekeeping?: Yes  Manage your money, pay your bills and track your expenses?: Yes  Make your own meals?: Yes    Do your own shopping?: Yes    Previous Hospitalizations:   Any hospitalizations or ED visits within the last 12 months?: Yes    How many hospitalizations have you had in the last year?: 3-4    Advance Care Planning:   Living will: Yes    Durable POA for healthcare:  Yes    Advanced directive: Yes      PREVENTIVE SCREENINGS      Cardiovascular Screening:    General: Screening Not Indicated and History Lipid Disorder      Diabetes Screening:     General: Screening Current      Colorectal Cancer Screening:     General: Screening Current      Prostate Cancer Screening:    General: History Prostate Cancer and Screening Not Indicated      Lung Cancer Screening:     General: Screening Not Indicated      Hepatitis C Screening:    General: Screening Current    Screening, Brief Intervention, and Referral to Treatment (SBIRT)    Screening  Typical number of drinks in a day: 1  Typical number of drinks in a week: 7  Interpretation: Low risk drinking behavior. AUDIT-C Screenin) How often did you have a drink containing alcohol in the past year? 4 or more times a week  2) How many drinks did you have on a typical day when you were drinking in the past year? 1 to 2  3) How often did you have 6 or more drinks on one occasion in the past year? never    AUDIT-C Score: 4  Interpretation: Score 4-12 (male): POSITIVE screen for alcohol misuse    AUDIT Screenin) How often during the last year have you found that you were not able to stop drinking once you had started? 0 - never  5) How often during the last year have you failed to do what was normally expected from you because of drinking? 0 - never  6) How often during the last year have you needed a first drink in the morning to get yourself going after a heavy drinking session?  0 - never  7) How often during the last year have you had a feeling of guilt or remorse after drinking? 0 - never  8) How often during the last year have you been unable to remember what happened the night before because you had been drinking? 0 - never  9) Have you or someone else been injured as a result of your drinking? 0 - no  10) Has a relative or friend or a doctor or another health worker been concerned about your drinking or suggested you cut down? 0 - no    AUDIT Score: 4  Interpretation: Low risk alcohol consumption    Single Item Drug Screening:  How often have you used an illegal drug (including marijuana) or a prescription medication for non-medical reasons in the past year? never    Single Item Drug Screen Score: 0  Interpretation: Negative screen for possible drug use disorder    No results found. Physical Exam:     There were no vitals taken for this visit.     Physical Exam     Julienne Marrero MD

## 2023-10-24 ENCOUNTER — TELEPHONE (OUTPATIENT)
Dept: FAMILY MEDICINE CLINIC | Facility: CLINIC | Age: 78
End: 2023-10-24

## 2023-10-24 ENCOUNTER — APPOINTMENT (OUTPATIENT)
Dept: LAB | Facility: HOSPITAL | Age: 78
End: 2023-10-24
Payer: MEDICARE

## 2023-10-24 DIAGNOSIS — Z01.818 PRE-OP EVALUATION: Chronic | ICD-10-CM

## 2023-10-24 DIAGNOSIS — I49.5 SICK SINUS SYNDROME (HCC): ICD-10-CM

## 2023-10-24 DIAGNOSIS — H02.115 CICATRICIAL ECTROPION OF LEFT LOWER EYELID: ICD-10-CM

## 2023-10-24 LAB
ANION GAP SERPL CALCULATED.3IONS-SCNC: 3 MMOL/L
BASOPHILS # BLD AUTO: 0.05 THOUSANDS/ÂΜL (ref 0–0.1)
BASOPHILS NFR BLD AUTO: 1 % (ref 0–1)
BUN SERPL-MCNC: 18 MG/DL (ref 5–25)
CALCIUM SERPL-MCNC: 8.8 MG/DL (ref 8.4–10.2)
CHLORIDE SERPL-SCNC: 105 MMOL/L (ref 96–108)
CO2 SERPL-SCNC: 30 MMOL/L (ref 21–32)
CREAT SERPL-MCNC: 0.81 MG/DL (ref 0.6–1.3)
EOSINOPHIL # BLD AUTO: 0.21 THOUSAND/ÂΜL (ref 0–0.61)
EOSINOPHIL NFR BLD AUTO: 5 % (ref 0–6)
ERYTHROCYTE [DISTWIDTH] IN BLOOD BY AUTOMATED COUNT: 12.4 % (ref 11.6–15.1)
GFR SERPL CREATININE-BSD FRML MDRD: 84 ML/MIN/1.73SQ M
GLUCOSE P FAST SERPL-MCNC: 90 MG/DL (ref 65–99)
HCT VFR BLD AUTO: 42.6 % (ref 36.5–49.3)
HGB BLD-MCNC: 14.1 G/DL (ref 12–17)
IMM GRANULOCYTES # BLD AUTO: 0.03 THOUSAND/UL (ref 0–0.2)
IMM GRANULOCYTES NFR BLD AUTO: 1 % (ref 0–2)
LYMPHOCYTES # BLD AUTO: 1.28 THOUSANDS/ÂΜL (ref 0.6–4.47)
LYMPHOCYTES NFR BLD AUTO: 27 % (ref 14–44)
MCH RBC QN AUTO: 31.5 PG (ref 26.8–34.3)
MCHC RBC AUTO-ENTMCNC: 33.1 G/DL (ref 31.4–37.4)
MCV RBC AUTO: 95 FL (ref 82–98)
MONOCYTES # BLD AUTO: 0.63 THOUSAND/ÂΜL (ref 0.17–1.22)
MONOCYTES NFR BLD AUTO: 14 % (ref 4–12)
NEUTROPHILS # BLD AUTO: 2.48 THOUSANDS/ÂΜL (ref 1.85–7.62)
NEUTS SEG NFR BLD AUTO: 52 % (ref 43–75)
NRBC BLD AUTO-RTO: 0 /100 WBCS
PLATELET # BLD AUTO: 152 THOUSANDS/UL (ref 149–390)
PMV BLD AUTO: 10.5 FL (ref 8.9–12.7)
POTASSIUM SERPL-SCNC: 4.1 MMOL/L (ref 3.5–5.3)
RBC # BLD AUTO: 4.47 MILLION/UL (ref 3.88–5.62)
SODIUM SERPL-SCNC: 138 MMOL/L (ref 135–147)
WBC # BLD AUTO: 4.68 THOUSAND/UL (ref 4.31–10.16)

## 2023-10-24 PROCEDURE — 85025 COMPLETE CBC W/AUTO DIFF WBC: CPT

## 2023-10-24 PROCEDURE — 80048 BASIC METABOLIC PNL TOTAL CA: CPT

## 2023-10-24 PROCEDURE — 36415 COLL VENOUS BLD VENIPUNCTURE: CPT

## 2023-10-24 NOTE — TELEPHONE ENCOUNTER
----- Message from Dat Tracey MD sent at 10/24/2023 10:02 AM EDT -----  Send copy of these labs to his eye doc at [facility] op) ; as well call him and tell him : all good; expedite the pre op paper work to send   ----- Message -----  From: Lab, Background User  Sent: 10/24/2023   8:15 AM EDT  To: Dat Tracey MD

## 2023-11-13 ENCOUNTER — TELEPHONE (OUTPATIENT)
Dept: RADIATION ONCOLOGY | Facility: CLINIC | Age: 78
End: 2023-11-13

## 2023-11-13 DIAGNOSIS — C61 PROSTATE CANCER (HCC): Primary | ICD-10-CM

## 2023-11-15 ENCOUNTER — APPOINTMENT (OUTPATIENT)
Dept: LAB | Facility: HOSPITAL | Age: 78
End: 2023-11-15
Payer: MEDICARE

## 2023-11-15 DIAGNOSIS — C61 PROSTATE CANCER (HCC): ICD-10-CM

## 2023-11-15 LAB — PSA SERPL-MCNC: 0.13 NG/ML (ref 0–4)

## 2023-11-15 PROCEDURE — 84153 ASSAY OF PSA TOTAL: CPT

## 2023-11-15 PROCEDURE — 36415 COLL VENOUS BLD VENIPUNCTURE: CPT

## 2023-11-28 ENCOUNTER — HOSPITAL ENCOUNTER (OUTPATIENT)
Dept: CT IMAGING | Facility: HOSPITAL | Age: 78
Discharge: HOME | End: 2023-11-28
Attending: SURGERY
Payer: MEDICARE

## 2023-11-28 DIAGNOSIS — D73.89 SPLENIC LESION: ICD-10-CM

## 2023-11-28 PROCEDURE — 74177 CT ABD & PELVIS W/CONTRAST: CPT

## 2023-11-28 PROCEDURE — G1004 CDSM NDSC: HCPCS

## 2023-11-28 RX ADMIN — IOHEXOL 100 ML: 350 INJECTION, SOLUTION INTRAVENOUS at 14:29

## 2023-12-04 NOTE — RESULT ENCOUNTER NOTE
Please call pt with normal or benign or stable results. These do not need any further workup or management. Follow up as indicated on last visit. These are generally benign findings and in this office no further follow-up is needed.

## 2023-12-08 ENCOUNTER — APPOINTMENT (OUTPATIENT)
Dept: RADIATION ONCOLOGY | Facility: CLINIC | Age: 78
End: 2023-12-08
Payer: MEDICARE

## 2023-12-11 ENCOUNTER — CLINICAL SUPPORT (OUTPATIENT)
Dept: RADIATION ONCOLOGY | Facility: CLINIC | Age: 78
End: 2023-12-11
Attending: RADIOLOGY
Payer: MEDICARE

## 2023-12-11 ENCOUNTER — RADIATION ONCOLOGY FOLLOW-UP (OUTPATIENT)
Dept: RADIATION ONCOLOGY | Facility: CLINIC | Age: 78
End: 2023-12-11
Payer: MEDICARE

## 2023-12-11 VITALS
SYSTOLIC BLOOD PRESSURE: 148 MMHG | HEART RATE: 52 BPM | BODY MASS INDEX: 22.05 KG/M2 | DIASTOLIC BLOOD PRESSURE: 78 MMHG | HEIGHT: 68 IN | OXYGEN SATURATION: 100 % | TEMPERATURE: 96.9 F | WEIGHT: 145.5 LBS | RESPIRATION RATE: 16 BRPM

## 2023-12-11 DIAGNOSIS — C61 PROSTATE CANCER (HCC): Primary | ICD-10-CM

## 2023-12-11 PROCEDURE — 99214 OFFICE O/P EST MOD 30 MIN: CPT | Performed by: RADIOLOGY

## 2023-12-11 PROCEDURE — 99211 OFF/OP EST MAY X REQ PHY/QHP: CPT | Performed by: RADIOLOGY

## 2023-12-11 NOTE — PROGRESS NOTES
Follow-up - Radiation Oncology   Tiffanie Davila 1945 77 y.o. male [id number]      History of Present Illness   Cancer Staging   No matching staging information was found for the patient. Interval History:  Tiffanie Davila 1945 is a 77 y.o. male  with adenocarcinoma [place] score 7 (3+ 4) of the prostate. He was initially diagnosed with [place] 6 (3+3) prostate cancer in 11/2018 and was on active surveillance. He had repeat prostate biopsy Sept 2019, revealing [place] 7 (3+4) disease. He completed a course of definitive radiation therapy to the prostate on 4/22/2020. He was last seen by radiation on 12/02/2022. Here for f/u. Follows with Family med/ General surg for splenic lesions. 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 Follow-up with MRI abdomen with and without contrast in 6 months is recommended for better characterization and to assess for stability. PSA, Total   Latest Ref Rng 0.00 - 4.00 ng/mL   11/23/2020 0.5    5/24/2021 0.3    12/6/2021 0.2    11/8/2022 0.2    9/1/2023 0.15    11/15/2023 0.13            11/28/23 CT abdomen pelvis w contrast  REPRODUCTIVE ORGANS: The prostate is mildly enlarged. Punctate metallic markers are redemonstrated. IMPRESSION:  Stable multiple subcentimeter hypodense splenic lesions. Stable additional findings as above. No urology follow ups in chart or scheduled. Historical Information   Oncology History   Prostate cancer ([address])   11/5/2018 Initial Diagnosis    Prostate cancer ([address])     11/5/2018 Biopsy    A.  Prostate, Right Peripheral Zone, Biopsy:  - Prostatic adenocarcinoma, Hi Pattern 3 + 3 (Hi Score 6), Grade Group 1; involving 4 of 6 cores, 20.5 linear mm of carcinoma (approximately 50% of total tissue). B. Prostate, Right Central Zone, Biopsy:  - Prostatic adenocarcinoma, [place] Pattern 3 + 3 ([place] Score 6), Grade Group 1; involving 1 of 2 cores, 9 linear mm of carcinoma (approximately 30% of total tissue). C. Prostate, Left Peripheral Zone, Biopsy:  - Atypical small acinar proliferation (ASAP), focally present in 1 of 2 cores. D. Prostate, Left Central Zone, Biopsy:  - Prostatic adenocarcinoma, [place] Pattern 3 + 3 ([place] Score 6), Grade Group 1; discontinuously involving 4 of 4 cores, 9 linear mm of carcinoma (approximately 20% of total tissue). - High-grade prostatic intraepithelial neoplasia (HGPIN). Dr Ha Ser       9/12/2019 Biopsy    A. Right lateral base prostate core biopsy:  - Adenocarcinoma, acinar type, Hi score 3+4=7, Grade Group 2  - Pattern 4 - 5% of tumor  - Continuously involving 80% of core  - No perineural invasion seen     B. Right lateral mid prostate core biopsy:  - Benign prostatic tissue     C. Right lateral apex prostate core biopsy:  - Benign prostatic tissue     D. Right medial base prostate core biopsy:  - Adenocarcinoma, acinar type, Hi score 3+4=7, Grade Group 2  - Pattern 4 - 10% of tumor  - Discontinuously involving 45% of core  - Perineural invasion is seen     E. Right medial mid prostate core biopsy:  - Adenocarcinoma, acinar type, [place] score 3+4=7, Grade Group 2  - Pattern 4 - 10% of tumor  - Continuously involving 10% of core  - No perineural invasion seen     F. Right medial apex prostate core biopsy:  - Benign prostatic tissue     G. Left medial base  Prostate core biopsy:  - Adenocarcinoma, acinar type, [place] score 3+4=7, Grade Group 2  - Pattern 4 - 25% of tumor  - Continuously involving 5% of core  - No perineural invasion seen     H. Left medial mid prostate core biopsy:  - Benign prostatic tissue     I. Left medial apex prostate core bopsy:  - Benign prostatic tissue     J. Left lateral base prostate core biopsy:   - Adenocarcinoma, acinar type, [place] score 3+4=7, Grade Group 2  - Pattern 4 - 20% of tumor  - Discontinuously involving 25% of core  - No perineural invasion seen     K. Left lateral mid prostate core biopsy:   - Adenocarcinoma, acinar type, [place] score 3+4=7, Grade Group 2  - Pattern 4 - 5% of tumor  - Continuously involving 10% of core  - No perineural invasion seen     L. Left lateral apex prostate core biopsy:  - Benign prostatic tissue     2/20/2020 - 4/22/2020 Radiation    Plan ID Energy Fractions Dose per Fraction (cGy) Dose Correction (cGy) Total Dose Delivered (cGy) Elapsed Days   Prost ProxSV 10X 31 / 44 180 0 5,580 43   Rev ProstPSV 10X 13 / 13 180 0 2,340 16              Past Medical History:   Diagnosis Date    Cancer ([address])     skin - L cheek carcinoma    Elevated PSA     Hyperlipidemia     Melanoma ([address])     L cheek, surgically removed    Microcystic adenexal carcinoma- left eye     2/2023 removal with radiation after with [facility]    Narcolepsy     Pacemaker 1988    Symptomatic Bradycardia    Prostate cancer ([address])     Snoring      Past Surgical History:   Procedure Laterality Date    ABDOMINAL HERNIA REPAIR N/A 01/19/2023    Procedure: REPAIR HERNIA EPIGASTRIC OPEN;  Surgeon: Missael Justice MD;  [facility];  Service: General    ATRIAL CARDIAC PACEMAKER INSERTION      CARDIAC CATHETERIZATION  02/12/2015    CARDIAC ELECTROPHYSIOLOGY PROCEDURE N/A 03/28/2023    Procedure: Cardiac pacer generator change;  Surgeon: Angelita Darby DO;  [facility];   Service: Cardiology    CARDIAC PACEMAKER PLACEMENT  04/2023    Battery replacement    COLONOSCOPY      EYE SURGERY Left 02/2023    removal of microcystic adenexal carcinoma with radiation after in [facility] (TRANSRECTAL ULTRASOUND GUIDANCE) N/A 02/03/2020    Procedure: INSERTION OF FIDUCIAL MARKER (TRANSRECTAL ULTRASOUND GUIDANCE) Zunilda Erwin;  Surgeon: Erika Martinez Lashanda Mooney MD;  Location: [facility] MAIN OR;  Service: Urology    IR UPPER EXTREMITY VENOGRAM- DIAGNOSTIC  11/08/2022    OK RPR 1ST INGUN HRNA AGE 5 YRS/> REDUCIBLE Left 12/20/2019    Procedure: INGUINAL HERNIA REPAIR;  Surgeon: Lizett Zamora DO;  [facility]: [facility];  Service: General    SKIN CANCER EXCISION      left cheek       Social History   Social History     Substance and Sexual Activity   Alcohol Use Yes    Alcohol/week: 7.0 standard drinks of alcohol    Types: 7 Glasses of wine per week    Comment: 1 glass beer/wine daily     Social History     Substance and Sexual Activity   Drug Use Never     Social History     Tobacco Use   Smoking Status Never   Smokeless Tobacco Never         Meds/Allergies     Current Outpatient Medications:     atorvastatin (LIPITOR) 20 mg tablet, TAKE 1 TABLET BY MOUTH  DAILY IN THE EARLY MORNING, Disp: 90 tablet, Rfl: 3    cholecalciferol (VITAMIN D3) 1,000 units tablet, Take 1,000 Units by mouth daily, Disp: , Rfl:     Cyanocobalamin (B-12) 1000 MCG CAPS, Take by mouth daily, Disp: , Rfl:     fluticasone (FLONASE) 50 mcg/act nasal spray, SPRAY 1 SPRAY INTO EACH NOSTRIL TWICE A DAY, Disp: 16 mL, Rfl: 5    ipratropium (ATROVENT) 0.06 % nasal spray, 2 sprays into each nostril 4 (four) times a day, Disp: 15 mL, Rfl: 5  Allergies   Allergen Reactions    Chlorhexidine Hives and Rash         Review of Systems  Constitutional: Negative. Negative for appetite change, chills, fatigue, fever and unexpected weight change. HENT:  Positive for hearing loss (right ear) and rhinorrhea. Eyes: Negative. Negative for visual disturbance. Wears glasses   Respiratory: Negative. Negative for cough and shortness of breath. Cardiovascular: Negative. Negative for chest pain and leg swelling. Gastrointestinal: Negative. Negative for abdominal pain, blood in stool, constipation, diarrhea, nausea and vomiting. Endocrine: Negative. Negative for cold intolerance and heat intolerance. Genitourinary:  Positive for frequency. Negative for decreased urine volume, difficulty urinating, dysuria, hematuria and urgency. Nocturia up to 3x per patient, pushing or straining to urinate rarely. States urinary stream is "normal"     Musculoskeletal:  Positive for back pain (stiffness in lower back). Negative for arthralgias. Skin: Negative. Negative for wound. Allergic/Immunologic: Positive for environmental allergies. Neurological: Negative. Negative for dizziness, weakness, light-headedness, numbness and headaches. Hematological:  Bruises/bleeds easily. Psychiatric/Behavioral: Negative. Negative for sleep disturbance. Clinical Trial: no     IPSS Questionnaire (AUA-7): Over the past month…     1)  How often have you had a sensation of not emptying your bladder completely after you finish urinating? 0 - Not at all   2)  How often have you had to urinate again less than two hours after you finished urinating? 1 - Less than 1 time in 5   3)  How often have you found you stopped and started again several times when you urinated? 0 - Not at all   4) How difficult have you found it to postpone urination? 0 - Not at all   5) How often have you had a weak urinary stream?  0 - Not at all   6) How often have you had to push or strain to begin urination? 1 - Less than 1 time in 5   7) How many times did you most typically get up to urinate from the time you went to bed until the time you got up in the morning? 3 - 3 times   Total Score:  5           OBJECTIVE:   /78 (BP Location: Right arm, Patient Position: Sitting, Cuff Size: Standard)   Pulse (!) 52   Temp (!) 96.9 °F (36.1 °C) (Temporal)   Resp 16   Ht 5' 7.52" (1.715 m)   Wt 66 kg (145 lb 8.1 oz)   SpO2 100%   BMI 22.44 kg/m²   Pain Assessment:  0  ECOG/Zubrod/WHO: 0 - Asymptomatic    Physical Exam   He is conversing appropriately. He has a well-healing scar under the left eye.   No surrounding erythema or drainage. No swelling. Ambulating independently. RESULTS    Lab Results:   Recent Results (from the past 672 hour(s))   PSA Total, Diagnostic    Collection Time: 11/15/23  9:46 AM   Result Value Ref Range    PSA, Diagnostic 0.13 0.00 - 4.00 ng/mL       Imaging Studies:CT abdomen pelvis w contrast    Result Date: 12/3/2023  Narrative: CT ABDOMEN AND PELVIS WITH IV CONTRAST INDICATION:   D73.89: Other diseases of spleen. COMPARISON: 6/5/2023. TECHNIQUE:  CT examination of the abdomen and pelvis was performed. Multiplanar 2D reformatted images were created from the source data. This examination, like all CT scans performed in the [facility], was performed utilizing techniques to minimize radiation dose exposure, including the use of iterative reconstruction and automated exposure control. Radiation dose length product (DLP) for this visit:  479 mGy-cm IV Contrast:  100 mL of iohexol (OMNIPAQUE) Enteric Contrast:  Enteric contrast was administered. FINDINGS: ABDOMEN LOWER CHEST: There is a stable 6 x 4 mm partially calcified subpleural nodule in the lingula. LIVER/BILIARY TREE:  Unremarkable. GALLBLADDER:  No calcified gallstones. No pericholecystic inflammatory change. SPLEEN: Numerous subcentimeter hypodense lesions are redemonstrated, not significantly changed. The largest again is seen measuring up to 8 mm. PANCREAS:  Unremarkable. ADRENAL GLANDS:  Unremarkable. KIDNEYS/URETERS:  Unremarkable. No hydronephrosis. STOMACH AND BOWEL: No bowel obstruction or focal inflammatory process. There is sigmoid diverticulosis without acute diverticulitis. APPENDIX:  No findings to suggest appendicitis. ABDOMINOPELVIC CAVITY:  No ascites. No pneumoperitoneum. No lymphadenopathy. VESSELS: The abdominal aorta is extensively calcified. PELVIS REPRODUCTIVE ORGANS: The prostate is mildly enlarged. Punctate metallic markers are redemonstrated. URINARY BLADDER:  Unremarkable.  ABDOMINAL WALL/INGUINAL REGIONS: Unremarkable. OSSEOUS STRUCTURES:  No acute fracture or destructive osseous lesion. Mild degenerative changes in the lumbar spine and bilateral hips. Impression: Stable multiple subcentimeter hypodense splenic lesions. Stable additional findings as above. Workstation performed: [id number]           Assessment/Plan:  Orders Placed This Encounter   Procedures    PSA Total, Diagnostic        Vignesh Staton is a 77y.o. year old male who is 3-1/2 years post definitive radiation therapy for favorable intermediate risk prostate carcinoma. His PSA from 11/15/2023 returned 0.13. No significant postradiation side effects. 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 Due to high risk features he underwent adjuvant radiation therapy at [facility] which he tolerated well. I have ordered follow-up PSA in 1 year prior to his follow-up visit. Kiana Amador MD  12/11/2023,9:56 AM    Portions of the record may have been created with voice recognition software. Occasional wrong word or "sound a like" substitutions may have occurred due to the inherent limitations of voice recognition software. Read the chart carefully and recognize, using context, where substitutions have occurred.

## 2023-12-11 NOTE — PROGRESS NOTES
Leatha Aggarwal 1945 is a 77 y.o. male  with adenocarcinoma Hi score 7 (3+ 4) of the prostate. He was initially diagnosed with [place] 6 (3+3) prostate cancer in 11/2018 and was on active surveillance. He had repeat prostate biopsy Sept 2019, revealing Hi 7 (3+4) disease. He completed a course of definitive radiation therapy to the prostate on 4/22/2020. He was last seen by radiation on 12/02/2022. Here for f/u. Follows with Family med/ General surg for splenic lesions. 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 Follow-up with MRI abdomen with and without contrast in 6 months is recommended for better characterization and to assess for stability. PSA, Total   Latest Ref Rng 0.00 - 4.00 ng/mL   11/23/2020 0.5    5/24/2021 0.3    12/6/2021 0.2    11/8/2022 0.2    9/1/2023 0.15    11/15/2023 0.13          11/28/23 CT abdomen pelvis w contrast  REPRODUCTIVE ORGANS: The prostate is mildly enlarged. Punctate metallic markers are redemonstrated. IMPRESSION:  Stable multiple subcentimeter hypodense splenic lesions. Stable additional findings as above. No urology follow ups in chart or scheduled. Follow up visit     Oncology History   Prostate cancer ([address])   11/5/2018 Initial Diagnosis    Prostate cancer ([address])     11/5/2018 Biopsy    A. Prostate, Right Peripheral Zone, Biopsy:  - Prostatic adenocarcinoma, Hi Pattern 3 + 3 (Hi Score 6), Grade Group 1; involving 4 of 6 cores, 20.5 linear mm of carcinoma (approximately 50% of total tissue).      B. Prostate, Right Central Zone, Biopsy:  - Prostatic adenocarcinoma, [place] Pattern 3 + 3 (Hi Score 6), Grade Group 1; involving 1 of 2 cores, 9 linear mm of carcinoma (approximately 30% of total tissue). C. Prostate, Left Peripheral Zone, Biopsy:  - Atypical small acinar proliferation (ASAP), focally present in 1 of 2 cores. D. Prostate, Left Central Zone, Biopsy:  - Prostatic adenocarcinoma, Hi Pattern 3 + 3 ([place] Score 6), Grade Group 1; discontinuously involving 4 of 4 cores, 9 linear mm of carcinoma (approximately 20% of total tissue). - High-grade prostatic intraepithelial neoplasia (HGPIN). Dr Monica Hurst       9/12/2019 Biopsy    A. Right lateral base prostate core biopsy:  - Adenocarcinoma, acinar type, [place] score 3+4=7, Grade Group 2  - Pattern 4 - 5% of tumor  - Continuously involving 80% of core  - No perineural invasion seen     B. Right lateral mid prostate core biopsy:  - Benign prostatic tissue     C. Right lateral apex prostate core biopsy:  - Benign prostatic tissue     D. Right medial base prostate core biopsy:  - Adenocarcinoma, acinar type, Hi score 3+4=7, Grade Group 2  - Pattern 4 - 10% of tumor  - Discontinuously involving 45% of core  - Perineural invasion is seen     E. Right medial mid prostate core biopsy:  - Adenocarcinoma, acinar type, Hi score 3+4=7, Grade Group 2  - Pattern 4 - 10% of tumor  - Continuously involving 10% of core  - No perineural invasion seen     F. Right medial apex prostate core biopsy:  - Benign prostatic tissue     G. Left medial base  Prostate core biopsy:  - Adenocarcinoma, acinar type, Hi score 3+4=7, Grade Group 2  - Pattern 4 - 25% of tumor  - Continuously involving 5% of core  - No perineural invasion seen     H. Left medial mid prostate core biopsy:  - Benign prostatic tissue     I. Left medial apex prostate core bopsy:  - Benign prostatic tissue     J. Left lateral base prostate core biopsy:   - Adenocarcinoma, acinar type, [place] score 3+4=7, Grade Group 2  - Pattern 4 - 20% of tumor  - Discontinuously involving 25% of core  - No perineural invasion seen     K.  Left lateral mid prostate core biopsy:   - Adenocarcinoma, acinar type, [place] score 3+4=7, Grade Group 2  - Pattern 4 - 5% of tumor  - Continuously involving 10% of core  - No perineural invasion seen     L. Left lateral apex prostate core biopsy:  - Benign prostatic tissue     2/20/2020 - 4/22/2020 Radiation    Plan ID Energy Fractions Dose per Fraction (cGy) Dose Correction (cGy) Total Dose Delivered (cGy) Elapsed Days   Prost ProxSV 10X 31 / 44 180 0 5,580 43   Rev ProstPSV 10X 13 / 13 180 0 2,340 16              Review of Systems:  Review of Systems   Constitutional: Negative. Negative for appetite change, chills, fatigue, fever and unexpected weight change. HENT:  Positive for hearing loss (right ear) and rhinorrhea. Eyes: Negative. Negative for visual disturbance. Wears glasses   Respiratory: Negative. Negative for cough and shortness of breath. Cardiovascular: Negative. Negative for chest pain and leg swelling. Gastrointestinal: Negative. Negative for abdominal pain, blood in stool, constipation, diarrhea, nausea and vomiting. Endocrine: Negative. Negative for cold intolerance and heat intolerance. Genitourinary:  Positive for frequency. Negative for decreased urine volume, difficulty urinating, dysuria, hematuria and urgency. Nocturia up to 3x per patient, pushing or straining to urinate rarely. States urinary stream is "normal"     Musculoskeletal:  Positive for back pain (stiffness in lower back). Negative for arthralgias. Skin: Negative. Negative for wound. Allergic/Immunologic: Positive for environmental allergies. Neurological: Negative. Negative for dizziness, weakness, light-headedness, numbness and headaches. Hematological:  Bruises/bleeds easily. Psychiatric/Behavioral: Negative. Negative for sleep disturbance. Clinical Trial: no    IPSS Questionnaire (AUA-7):   Over the past month…    1)  How often have you had a sensation of not emptying your bladder completely after you finish urinating? 0 - Not at all   2)  How often have you had to urinate again less than two hours after you finished urinating? 1 - Less than 1 time in 5   3)  How often have you found you stopped and started again several times when you urinated? 0 - Not at all   4) How difficult have you found it to postpone urination? 0 - Not at all   5) How often have you had a weak urinary stream?  0 - Not at all   6) How often have you had to push or strain to begin urination? 1 - Less than 1 time in 5   7) How many times did you most typically get up to urinate from the time you went to bed until the time you got up in the morning?   3 - 3 times   Total Score:  5       Teaching completed    Health Maintenance   Topic Date Due    COVID-19 Vaccine (5 - Moderna series) 07/01/2022    Influenza Vaccine (1) 09/01/2023    Depression Screening  01/03/2024    Fall Risk  10/23/2024    Medicare Annual Wellness Visit (AWV)  10/23/2024    BMI: Adult  10/23/2024    Colorectal Cancer Screening  10/05/2031    Hepatitis C Screening  Completed    Pneumococcal Vaccine: 65+ Years  Completed    HIB Vaccine  Aged Out    IPV Vaccine  Aged Out    Hepatitis A Vaccine  Aged Out    Meningococcal ACWY Vaccine  Aged Out    HPV Vaccine  Aged Out     Patient Active Problem List   Diagnosis    Prostate cancer ([address])    Sick sinus syndrome ([address])    Mixed hyperlipidemia    Pacemaker    Splenic lesion    Abnormal CT of the abdomen    Narcolepsy    Pre-op evaluation     Past Medical History:   Diagnosis Date    Cancer ([address])     skin - L cheek carcinoma    Elevated PSA     Hyperlipidemia     Melanoma ([address])     L cheek, surgically removed    Narcolepsy     Pacemaker 1988    Symptomatic Bradycardia    Prostate cancer ([address])     Snoring      Past Surgical History:   Procedure Laterality Date    ABDOMINAL HERNIA REPAIR N/A 1/19/2023    Procedure: REPAIR HERNIA EPIGASTRIC OPEN;  Surgeon: Ludin Ledbetter MD;  [facility];  Service: General    ATRIAL CARDIAC PACEMAKER INSERTION      CARDIAC CATHETERIZATION  02/12/2015    CARDIAC ELECTROPHYSIOLOGY PROCEDURE N/A 3/28/2023    Procedure: Cardiac pacer generator change;  Surgeon: Dinesh Guadarrama DO;  [facility]; Service: Cardiology    COLONOSCOPY      INSERTION OF FIDUCIAL MARKER (TRANSRECTAL ULTRASOUND GUIDANCE) N/A 2/3/2020    Procedure: INSERTION OF FIDUCIAL MARKER (TRANSRECTAL ULTRASOUND GUIDANCE) Sandra Bhagat;  Surgeon: Shauna Correa MD;  Location[facility];  Service: Urology    IR UPPER EXTREMITY VENOGRAM- DIAGNOSTIC  11/8/2022    MO RPR 1ST INGUN HRNA AGE 5 YRS/> REDUCIBLE Left 12/20/2019    Procedure: INGUINAL HERNIA REPAIR;  Surgeon: Bert Carranza DO;  [facility]: [facility];  Service: General    SKIN CANCER EXCISION      left cheek     Family History   Problem Relation Age of Onset    Coronary artery disease Mother     Coronary artery disease Father     Skin cancer Father     Hyperlipidemia Father     Cancer Father         Had bladder cancer    Coronary artery disease Brother      Social History     Socioeconomic History    Marital status: /Civil Union     Spouse name: Not on file    Number of children: 3    Years of education: Not on file    Highest education level: Not on file   Occupational History    Not on file   Tobacco Use    Smoking status: Never    Smokeless tobacco: Never   Vaping Use    Vaping Use: Never used   Substance and Sexual Activity    Alcohol use: Yes     Alcohol/week: 7.0 standard drinks of alcohol     Types: 7 Glasses of wine per week     Comment: 1 glass beer/wine daily    Drug use: Never    Sexual activity: Not Currently     Partners: Female     Comment: medication not effective since radiation. Has ED.    Other Topics Concern    Not on file   Social History Narrative    Not on file     Social Determinants of Health     Financial Resource Strain: Low Risk  (10/18/2023)    Overall Financial Resource Strain (CARDIA)     Difficulty of Paying Living Expenses: Not hard at all   Food Insecurity: Not on file   Transportation Needs: No Transportation Needs (10/18/2023)    PRAPARE - Transportation     Lack of Transportation (Medical): No     Lack of Transportation (Non-Medical): No   Physical Activity: Not on file   Stress: Not on file   Social Connections: Not on file   Intimate Partner Violence: Not on file   Housing Stability: Not on file       Current Outpatient Medications:     atorvastatin (LIPITOR) 20 mg tablet, TAKE 1 TABLET BY MOUTH  DAILY IN THE EARLY MORNING, Disp: 90 tablet, Rfl: 3    cholecalciferol (VITAMIN D3) 1,000 units tablet, Take 1,000 Units by mouth daily, Disp: , Rfl:     Cyanocobalamin (B-12) 1000 MCG CAPS, Take by mouth daily, Disp: , Rfl:     fluticasone (FLONASE) 50 mcg/act nasal spray, SPRAY 1 SPRAY INTO EACH NOSTRIL TWICE A DAY, Disp: 16 mL, Rfl: 5    ipratropium (ATROVENT) 0.06 % nasal spray, 2 sprays into each nostril 4 (four) times a day, Disp: 15 mL, Rfl: 5    mupirocin (BACTROBAN) 2 % ointment, Apply topically 3 (three) times a day, Disp: 22 g, Rfl: 0  Allergies   Allergen Reactions    Chlorhexidine Hives and Rash     There were no vitals filed for this visit.

## 2024-01-09 ENCOUNTER — REMOTE DEVICE CLINIC VISIT (OUTPATIENT)
Dept: CARDIOLOGY CLINIC | Facility: CLINIC | Age: 79
End: 2024-01-09
Payer: MEDICARE

## 2024-01-09 DIAGNOSIS — Z95.0 PRESENCE OF CARDIAC PACEMAKER: Primary | ICD-10-CM

## 2024-01-09 PROCEDURE — 93294 REM INTERROG EVL PM/LDLS PM: CPT | Performed by: INTERNAL MEDICINE

## 2024-01-09 PROCEDURE — 93296 REM INTERROG EVL PM/IDS: CPT | Performed by: INTERNAL MEDICINE

## 2024-01-09 NOTE — PROGRESS NOTES
Results for orders placed or performed in visit on 01/09/24   Cardiac EP device report    Narrative    SJM DUAL CHAMBER PPM (DDD MODE) - NOT MRI CONDITIONAL  MERLIN TRANSMISSION: BATTERY VOLTAGE ADEQUATE (7 YRS). AP: 25%. : <1%. ALL AVAILABLE LEAD PARAMETERS WITHIN NORMAL LIMITS. 101 NOISE REVERSION EPISODES W/ AVAIL EGMS SHOWING NOISE (HX OF SAME~ RV LEAD UNIPOLAR). 2 AMS EPISODES W/ AVAIL EGM SHOWING PAT, DURATION 12 SECS. AF BURDEN: <1%. PT DOES NOT TAKE ANY BB, AC. NO AVAIL EF IN EPIC. NORMAL DEVICE FUNCTION. CH

## 2024-01-25 ENCOUNTER — OFFICE VISIT (OUTPATIENT)
Dept: FAMILY MEDICINE CLINIC | Facility: CLINIC | Age: 79
End: 2024-01-25
Payer: MEDICARE

## 2024-01-25 VITALS
HEART RATE: 60 BPM | OXYGEN SATURATION: 99 % | HEIGHT: 68 IN | BODY MASS INDEX: 22.07 KG/M2 | TEMPERATURE: 97.9 F | DIASTOLIC BLOOD PRESSURE: 80 MMHG | WEIGHT: 145.6 LBS | SYSTOLIC BLOOD PRESSURE: 120 MMHG

## 2024-01-25 DIAGNOSIS — C61 PROSTATE CANCER (HCC): ICD-10-CM

## 2024-01-25 DIAGNOSIS — I49.5 SICK SINUS SYNDROME (HCC): ICD-10-CM

## 2024-01-25 DIAGNOSIS — J40 BRONCHITIS: Primary | ICD-10-CM

## 2024-01-25 PROCEDURE — 99214 OFFICE O/P EST MOD 30 MIN: CPT | Performed by: FAMILY MEDICINE

## 2024-01-25 RX ORDER — AZITHROMYCIN 250 MG/1
TABLET, FILM COATED ORAL
Qty: 6 TABLET | Refills: 0 | Status: SHIPPED | OUTPATIENT
Start: 2024-01-25 | End: 2024-01-29

## 2024-01-25 NOTE — PROGRESS NOTES
Assessment/Plan:      1. Bronchitis  Assessment & Plan:  Unchanged  In the setting of post-COVID  Symptoms initially improved and now worsened about 2 weeks ago  Concern for bacterial infection  Start azithromycin for 5 days  Discussed return precautions    Orders:  -     azithromycin (ZITHROMAX) 250 mg tablet; Take 2 tablets today then 1 tablet daily x 4 days    2. Sick sinus syndrome (HCC)  Assessment & Plan:  Well-controlled  Has pacer in place which was replaced last year      3. Prostate cancer (HCC)  Assessment & Plan:  Well-controlled  PSA remains low              Subjective:      Patient ID: Juan Manuel Nichole is a 78 y.o. male p[resents today for sick visit.  Symptoms: fatigue, cough, sore throat, chills,   Symptom onset:  2 weeks ago  Medications tried: mucinex  COVID vaccine: fully vaccine  COVID tests: tested positive 1/1/2024  Sick contacts: none  unchanged    Sore Throat   Associated symptoms include coughing. Pertinent negatives include no abdominal pain, diarrhea, ear pain, headaches, shortness of breath or vomiting.   Cough  Associated symptoms include a sore throat. Pertinent negatives include no chest pain, chills, ear pain, fever, headaches, myalgias, postnasal drip, rhinorrhea, shortness of breath or wheezing.       The following portions of the patient's history were reviewed and updated as appropriate: allergies, current medications, past family history, past medical history, past social history, past surgical history, and problem list.    Review of Systems   Constitutional:  Negative for activity change, chills, diaphoresis and fever.   HENT:  Positive for sore throat. Negative for ear pain, hearing loss, postnasal drip, rhinorrhea, sinus pressure, sinus pain and sneezing.    Respiratory:  Positive for cough. Negative for chest tightness, shortness of breath and wheezing.    Cardiovascular:  Negative for chest pain, palpitations and leg swelling.   Gastrointestinal:  Negative for abdominal  "pain, blood in stool, constipation, diarrhea, nausea and vomiting.   Genitourinary:  Negative for dysuria, frequency, hematuria and urgency.   Musculoskeletal:  Negative for arthralgias and myalgias.   Neurological:  Negative for dizziness, syncope, weakness, light-headedness, numbness and headaches.         Objective:      /80 (BP Location: Left arm, Patient Position: Sitting, Cuff Size: Standard)   Pulse 60   Temp 97.9 °F (36.6 °C) (Temporal)   Ht 5' 7.5\" (1.715 m)   Wt 66 kg (145 lb 9.6 oz)   SpO2 99%   BMI 22.47 kg/m²          Physical Exam  Vitals reviewed.   Constitutional:       General: He is not in acute distress.     Appearance: He is well-developed. He is not diaphoretic.   HENT:      Head: Normocephalic and atraumatic.      Right Ear: External ear normal.      Left Ear: External ear normal.      Nose: Nose normal. No congestion.      Mouth/Throat:      Mouth: Mucous membranes are moist.      Pharynx: Oropharynx is clear. No oropharyngeal exudate.   Eyes:      General: No scleral icterus.     Pupils: Pupils are equal, round, and reactive to light.   Neck:      Thyroid: No thyromegaly.      Vascular: No JVD.      Trachea: No tracheal deviation.   Cardiovascular:      Rate and Rhythm: Normal rate and regular rhythm.      Pulses: Normal pulses.      Heart sounds: Normal heart sounds. No murmur heard.     No friction rub.   Pulmonary:      Effort: Pulmonary effort is normal. No respiratory distress.      Breath sounds: Normal breath sounds. No wheezing or rales.   Chest:      Chest wall: No tenderness.   Abdominal:      General: Bowel sounds are normal. There is no distension.      Palpations: Abdomen is soft.      Tenderness: There is no abdominal tenderness. There is no right CVA tenderness, left CVA tenderness, guarding or rebound.   Musculoskeletal:         General: No tenderness. Normal range of motion.      Cervical back: Normal range of motion and neck supple. No tenderness.      Right " lower leg: No edema.      Left lower leg: No edema.   Lymphadenopathy:      Cervical: No cervical adenopathy.   Skin:     General: Skin is warm and dry.   Neurological:      Mental Status: He is alert and oriented to person, place, and time. Mental status is at baseline.      Deep Tendon Reflexes: Reflexes are normal and symmetric.   Psychiatric:         Mood and Affect: Mood normal.         Behavior: Behavior normal.         Thought Content: Thought content normal.         Judgment: Judgment normal.

## 2024-01-25 NOTE — ASSESSMENT & PLAN NOTE
Unchanged  In the setting of post-COVID  Symptoms initially improved and now worsened about 2 weeks ago  Concern for bacterial infection  Start azithromycin for 5 days  Discussed return precautions

## 2024-04-17 ENCOUNTER — REMOTE DEVICE CLINIC VISIT (OUTPATIENT)
Dept: CARDIOLOGY CLINIC | Facility: CLINIC | Age: 79
End: 2024-04-17
Payer: MEDICARE

## 2024-04-17 DIAGNOSIS — Z95.0 PRESENCE OF PERMANENT CARDIAC PACEMAKER: Primary | ICD-10-CM

## 2024-04-17 PROCEDURE — 93296 REM INTERROG EVL PM/IDS: CPT | Performed by: INTERNAL MEDICINE

## 2024-04-17 PROCEDURE — 93294 REM INTERROG EVL PM/LDLS PM: CPT | Performed by: INTERNAL MEDICINE

## 2024-04-17 NOTE — PROGRESS NOTES
SJM DC PM (DDD MODE) - NOT MRI CONDITIONAL   MERLIN TRANSMISSION:  BATTERY VOLTAGE ADEQUATE (6.8-9.9 YR.).  AP 24%  <1%.  ALL LEAD PARAMETERS WITHIN NORMAL LIMITS.  NO SIGNIFICANT HIGH RATE EPISODES.  107 NOISE REVERSION EPISODES WITH 15 AVAILABLE EGMS SHOWING MYOPOTENTIAL OVERSENSING ON BOTH CHANNELS, WHICH IS PRIMARILY OVERSENSED ON THE UNIPOLAR RV LEAD DURING PERIODS OF SR, AND RESULTING IN BRIEF NOISE MODE OPERATION.  NORMAL DEVICE FUNCTION.  RG

## 2024-06-25 DIAGNOSIS — E78.5 HYPERLIPIDEMIA: ICD-10-CM

## 2024-06-26 RX ORDER — ATORVASTATIN CALCIUM 20 MG/1
TABLET, FILM COATED ORAL
Qty: 90 TABLET | Refills: 0 | Status: SHIPPED | OUTPATIENT
Start: 2024-06-26

## 2024-08-27 DIAGNOSIS — E78.5 HYPERLIPIDEMIA: ICD-10-CM

## 2024-08-29 RX ORDER — ATORVASTATIN CALCIUM 20 MG/1
TABLET, FILM COATED ORAL
Qty: 90 TABLET | Refills: 3 | Status: SHIPPED | OUTPATIENT
Start: 2024-08-29

## 2024-09-10 NOTE — PROGRESS NOTES
Electrophysiology Follow Up    [facility]   [facility]  [address]  [place] [address]      Juan Manuel Nichole  : 1945  MRN: [id number]    Date of Visit: 2024       Assessment & Plan     1. Sick sinus syndrome (HCC)  POCT ECG      2. Mixed hyperlipidemia        3. 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  He underwent pacemaker interrogation today which showed appropriate function including lead sensing, thresholds, and impedances.  His RV lead was initially implanted in , thankfully all lead trends are stable.  He is programmed DDD 50, and atrially paces 25% of the time.    We thoroughly reviewed prior device interrogations, which have shown occasional noise on both the atrial and ventricular leads.  This implies that there may be some external interference leading to this noise, however nothing identifiable at this time.  As his device is functioning appropriately and all trends are stable, we  will simply continue to monitor at this time.  If there are changes in his lead function or signs of fracture, we will then need to discuss possible left sided reimplantation versus extraction.  He understands that at this time, watchful waiting will be utilized.    He is maintained on statin therapy, which he is tolerating well.  Labs from last year showed stable LFTs, this will continue to be monitored by his PCP moving forward.    He will continue to follow on a yearly basis, with routine device interrogations as scheduled.  We will contact him if any changes are noted, and he knows to contact us with any questions, concerns, or changes in symptoms.      History of Present Illness     CHIEF COMPLAINT: Yearly follow-up, no significant complaints    HPI/INTERVAL HISTORY: Juan Manuel Nichole is a 79 y.o. 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  His initial pacemaker was placed in 1988.  In 2006 he underwent generator change as well as addition of new right atrial lead, as his prior right atrial lead was clearly fractured and nonfunctional.  His atrial and ventricular pacing percentages are both less than 20%.  Through routine device interrogations it was noted that he was nearing IMER.  There was discussion about possibly adding new leads given that his RV lead is from 1988.  An upper extremity venogram was previously performed which showed narrowing of the central subclavian vein with large proximal collaterals secondary to his pacing wires.  After further discussion and further testing of his wires, it was decided that he would undergo simple pacemaker generator change.  He underwent this procedure 3/2023 without issues.  He is being seen today in routine yearly follow-up, no recent issues noted.     He  continues to do well overall, is extremely active without symptoms or limitations.  He recently did a 3 mile hike around [place] without issues.  In general he denies chest pain or pressure, shortness of breath, palpitations, dizziness, lightheadedness, presyncope, or syncope.  He underwent generator change last year with no current issues with his device site.      Review of Systems   Constitutional:  Negative for fatigue.   Respiratory:  Negative for chest tightness and shortness of breath.    Cardiovascular:  Negative for chest pain, palpitations and leg swelling.   Neurological:  Negative for dizziness, syncope and light-headedness.     ROS as noted above, otherwise 12 point review of systems was performed and is negative.       Historical Information  - I have personally reviewed and updated this information, including social history, medical history, and family history.  Social History     Socioeconomic History    Marital status: /Civil Union     Spouse name: Not on file    Number of children: 3    Years of education: Not on file    Highest education level: Not on file   Occupational History    Not on file   Tobacco Use    Smoking status: Never    Smokeless tobacco: Never   Vaping Use    Vaping status: Never Used   Substance and Sexual Activity    Alcohol use: Yes     Alcohol/week: 7.0 standard drinks of alcohol     Types: 7 Glasses of wine per week     Comment: 1 glass beer/wine daily    Drug use: Never    Sexual activity: Not Currently     Partners: Female     Comment: medication not effective since radiation. Has ED.   Other Topics Concern    Not on file   Social History Narrative    Not on file     Social Determinants of Health     Financial Resource Strain: Low Risk  (10/18/2023)    Overall Financial Resource Strain (CARDIA)     Difficulty of Paying Living Expenses: Not hard at all   Food Insecurity: Not on file   Transportation Needs: No Transportation Needs (10/18/2023)    PRAPARE -  Transportation     Lack of Transportation (Medical): No     Lack of Transportation (Non-Medical): No   Physical Activity: Not on file   Stress: Not on file   Social Connections: Not on file   Intimate Partner Violence: Not on file   Housing Stability: Not on file     Past Medical History:   Diagnosis Date    Cancer (HCC)     skin - L cheek carcinoma    Elevated PSA     Hyperlipidemia     Melanoma (HCC)     L cheek, surgically removed    Microcystic adenexal carcinoma- left eye     2/2023 removal with radiation after with [facility]    Narcolepsy     Pacemaker 1988    Symptomatic Bradycardia    Prostate cancer (HCC)     Snoring      Past Surgical History:   Procedure Laterality Date    ABDOMINAL HERNIA REPAIR N/A 01/19/2023    Procedure: REPAIR HERNIA EPIGASTRIC OPEN;  Surgeon: Sue Mahmood MD;  Location: [facility];  Service: General    ATRIAL CARDIAC PACEMAKER INSERTION      BASAL CELL CARCINOMA EXCISION      CARDIAC CATHETERIZATION  02/12/2015    CARDIAC ELECTROPHYSIOLOGY PROCEDURE N/A 03/28/2023    Procedure: Cardiac pacer generator change;  Surgeon: Dev Vazquez DO;  Location: [facility];  Service: Cardiology    CARDIAC PACEMAKER PLACEMENT  04/2023    Battery replacement    COLONOSCOPY      EYE SURGERY Left 02/2023    removal of microcystic adenexal carcinoma with radiation after in [facility]    EYE SURGERY  11/10/2023    INSERTION OF FIDUCIAL MARKER (TRANSRECTAL ULTRASOUND GUIDANCE) N/A 02/03/2020    Procedure: INSERTION OF FIDUCIAL MARKER (TRANSRECTAL ULTRASOUND GUIDANCE) SPACEOAR;  Surgeon: Wayne Machuca MD;  Location: [facility];  Service: Urology    IR UPPER EXTREMITY VENOGRAM- DIAGNOSTIC  11/08/2022    CT RPR 1ST INGUN HRNA AGE 5 YRS/> REDUCIBLE Left 12/20/2019    Procedure: INGUINAL HERNIA REPAIR;  Surgeon: Jalen Sosa DO;  Location: [facility];  Service: General    SKIN CANCER EXCISION      left cheek     Social History     Substance and Sexual Activity   Alcohol Use Yes    Alcohol/week: 7.0  "standard drinks of alcohol    Types: 7 Glasses of wine per week    Comment: 1 glass beer/wine daily     Social History     Substance and Sexual Activity   Drug Use Never     Social History     Tobacco Use   Smoking Status Never   Smokeless Tobacco Never     Family History   Problem Relation Age of Onset    Coronary artery disease Mother     Coronary artery disease Father     Skin cancer Father     Hyperlipidemia Father     Cancer Father         Had bladder cancer    Coronary artery disease Brother        Meds/Allergies       Current Outpatient Medications:     atorvastatin (LIPITOR) 20 mg tablet, TAKE 1 TABLET BY MOUTH DAILY IN  THE EARLY MORNING, Disp: 90 tablet, Rfl: 3    cholecalciferol (VITAMIN D3) 1,000 units tablet, Take 1,000 Units by mouth daily, Disp: , Rfl:     Cyanocobalamin (B-12) 1000 MCG CAPS, Take by mouth daily, Disp: , Rfl:     ipratropium (ATROVENT) 0.06 % nasal spray, 1 spray into each nostril 4 (four) times a day, Disp: 15 mL, Rfl: 5    mupirocin (BACTROBAN) 2 % ointment, Apply topically daily Applied to the left nostril daily at bedtime, Disp: 22 g, Rfl: 1    fluticasone (FLONASE) 50 mcg/act nasal spray, 1 spray into each nostril 2 (two) times a day (Patient not taking: Reported on 9/11/2024), Disp: 16 mL, Rfl: 5    Allergies   Allergen Reactions    Chlorhexidine Hives and Rash       Objective   Vitals: Blood pressure 124/62, pulse 100, height 5' 7.5\" (1.715 m), weight 64.5 kg (142 lb 3.2 oz).        Physical Exam  GEN: NAD, alert and oriented x 3, well appearing  SKIN: dry without significant lesions or rashes  HEENT: NCAT, PERRL, EOMs intact  NECK: No JVD appreciated  CARDIOVASCULAR: RRR, normal S1, S2 without murmurs, rubs, or gallops appreciated  LUNGS: Clear to auscultation bilaterally without wheezes, rhonchi, or rales  ABDOMEN: Soft, nontender, nondistended  EXTREMITIES/VASCULAR: perfused without clubbing, cyanosis, or LE edema b/l  PSYCH: Normal mood and affect  NEURO: CN ll-Xll grossly " intact      Labs:  No visits with results within 6 Month(s) from this visit.   Latest known visit with results is:   Appointment on 11/15/2023   Component Date Value    PSA, Diagnostic 11/15/2023 0.13          Imaging: No pertinent imaging studies reviewed.    ECHO: No results found for this or any previous visit.      No results found for this or any previous visit.        CATH/STRESS TEST: No recent studies noted      EKG: Atrial pacing with intact ventricular conduction, heart rate 50 bpm

## 2024-09-11 ENCOUNTER — OFFICE VISIT (OUTPATIENT)
Dept: CARDIOLOGY CLINIC | Facility: CLINIC | Age: 79
End: 2024-09-11
Payer: MEDICARE

## 2024-09-11 ENCOUNTER — IN-CLINIC DEVICE VISIT (OUTPATIENT)
Dept: CARDIOLOGY CLINIC | Facility: CLINIC | Age: 79
End: 2024-09-11
Payer: MEDICARE

## 2024-09-11 VITALS
BODY MASS INDEX: 21.55 KG/M2 | SYSTOLIC BLOOD PRESSURE: 124 MMHG | WEIGHT: 142.2 LBS | HEART RATE: 100 BPM | DIASTOLIC BLOOD PRESSURE: 62 MMHG | HEIGHT: 68 IN

## 2024-09-11 DIAGNOSIS — I49.5 SICK SINUS SYNDROME (HCC): Primary | ICD-10-CM

## 2024-09-11 DIAGNOSIS — Z95.0 PACEMAKER: ICD-10-CM

## 2024-09-11 DIAGNOSIS — I49.5 SSS (SICK SINUS SYNDROME) (HCC): Primary | ICD-10-CM

## 2024-09-11 DIAGNOSIS — E78.2 MIXED HYPERLIPIDEMIA: ICD-10-CM

## 2024-09-11 PROCEDURE — 99214 OFFICE O/P EST MOD 30 MIN: CPT | Performed by: INTERNAL MEDICINE

## 2024-09-11 PROCEDURE — 93000 ELECTROCARDIOGRAM COMPLETE: CPT | Performed by: INTERNAL MEDICINE

## 2024-09-11 PROCEDURE — 93280 PM DEVICE PROGR EVAL DUAL: CPT | Performed by: INTERNAL MEDICINE

## 2024-09-11 NOTE — PROGRESS NOTES
Results for orders placed or performed in visit on 09/11/24   Cardiac EP device report    Narrative    SJM DC PM (DDD MODE) - NOT MRI CONDITIONAL  DEVICE INTERROGATED IN THE [place] OFFICE. BATTERY VOLTAGE ADEQUATE (6.4 YRS). AP: 28%. : <1%. ALL LEAD PARAMETERS WITHIN NORMAL LIMITS (NOTE RV LEAD IS UNIPOLAR @ 290 OHMS, LEAD WAS IMPLANTED 1988, PER . DT CONTINUE TO MONITOR IF LEAD IMP <200 OHMS NOTIFY IMMEDIATELY). NOISE REVERSION/AMS EPISODES W/ AVAIL EGMS SHOWING NOISE (HX OF SAME, RV BIP > 3000 OHMS, PT IS PROGRAMMED UNI). PT SEEN TODAY BY DINO READ. NO PROGRAMMING CHANGES MADE TO DEVICE PARAMETERS. NORMAL DEVICE FUNCTION. CH

## 2024-10-07 ENCOUNTER — TELEPHONE (OUTPATIENT)
Dept: FAMILY MEDICINE CLINIC | Facility: CLINIC | Age: 79
End: 2024-10-07

## 2024-10-22 ENCOUNTER — CONSULT (OUTPATIENT)
Dept: FAMILY MEDICINE CLINIC | Facility: CLINIC | Age: 79
End: 2024-10-22
Payer: MEDICARE

## 2024-10-22 ENCOUNTER — APPOINTMENT (OUTPATIENT)
Dept: LAB | Facility: HOSPITAL | Age: 79
End: 2024-10-22
Payer: MEDICARE

## 2024-10-22 VITALS
OXYGEN SATURATION: 98 % | TEMPERATURE: 97.2 F | SYSTOLIC BLOOD PRESSURE: 110 MMHG | HEIGHT: 68 IN | BODY MASS INDEX: 22.16 KG/M2 | DIASTOLIC BLOOD PRESSURE: 70 MMHG | WEIGHT: 146.2 LBS | HEART RATE: 63 BPM

## 2024-10-22 DIAGNOSIS — J30.1 SEASONAL ALLERGIC RHINITIS DUE TO POLLEN: ICD-10-CM

## 2024-10-22 DIAGNOSIS — I49.5 SICK SINUS SYNDROME (HCC): ICD-10-CM

## 2024-10-22 DIAGNOSIS — Z95.0 PACEMAKER: ICD-10-CM

## 2024-10-22 DIAGNOSIS — Z01.818 PRE-OP EVALUATION: Primary | Chronic | ICD-10-CM

## 2024-10-22 DIAGNOSIS — Z01.818 PRE-OP EVALUATION: Chronic | ICD-10-CM

## 2024-10-22 LAB
ANION GAP SERPL CALCULATED.3IONS-SCNC: 6 MMOL/L (ref 4–13)
BASOPHILS # BLD AUTO: 0.03 THOUSANDS/ΜL (ref 0–0.1)
BASOPHILS NFR BLD AUTO: 1 % (ref 0–1)
BUN SERPL-MCNC: 15 MG/DL (ref 5–25)
CALCIUM SERPL-MCNC: 9.5 MG/DL (ref 8.4–10.2)
CHLORIDE SERPL-SCNC: 103 MMOL/L (ref 96–108)
CO2 SERPL-SCNC: 29 MMOL/L (ref 21–32)
CREAT SERPL-MCNC: 0.91 MG/DL (ref 0.6–1.3)
EOSINOPHIL # BLD AUTO: 0.16 THOUSAND/ΜL (ref 0–0.61)
EOSINOPHIL NFR BLD AUTO: 3 % (ref 0–6)
ERYTHROCYTE [DISTWIDTH] IN BLOOD BY AUTOMATED COUNT: 12.6 % (ref 11.6–15.1)
GFR SERPL CREATININE-BSD FRML MDRD: 79 ML/MIN/1.73SQ M
GLUCOSE SERPL-MCNC: 82 MG/DL (ref 65–140)
HCT VFR BLD AUTO: 41.5 % (ref 36.5–49.3)
HGB BLD-MCNC: 14.1 G/DL (ref 12–17)
IMM GRANULOCYTES # BLD AUTO: 0.02 THOUSAND/UL (ref 0–0.2)
IMM GRANULOCYTES NFR BLD AUTO: 0 % (ref 0–2)
LYMPHOCYTES # BLD AUTO: 1.49 THOUSANDS/ΜL (ref 0.6–4.47)
LYMPHOCYTES NFR BLD AUTO: 28 % (ref 14–44)
MCH RBC QN AUTO: 30.7 PG (ref 26.8–34.3)
MCHC RBC AUTO-ENTMCNC: 34 G/DL (ref 31.4–37.4)
MCV RBC AUTO: 90 FL (ref 82–98)
MONOCYTES # BLD AUTO: 0.6 THOUSAND/ΜL (ref 0.17–1.22)
MONOCYTES NFR BLD AUTO: 11 % (ref 4–12)
NEUTROPHILS # BLD AUTO: 2.96 THOUSANDS/ΜL (ref 1.85–7.62)
NEUTS SEG NFR BLD AUTO: 57 % (ref 43–75)
NRBC BLD AUTO-RTO: 0 /100 WBCS
PLATELET # BLD AUTO: 175 THOUSANDS/UL (ref 149–390)
PMV BLD AUTO: 10 FL (ref 8.9–12.7)
POTASSIUM SERPL-SCNC: 4.4 MMOL/L (ref 3.5–5.3)
RBC # BLD AUTO: 4.59 MILLION/UL (ref 3.88–5.62)
SODIUM SERPL-SCNC: 138 MMOL/L (ref 135–147)
WBC # BLD AUTO: 5.26 THOUSAND/UL (ref 4.31–10.16)

## 2024-10-22 PROCEDURE — 85025 COMPLETE CBC W/AUTO DIFF WBC: CPT

## 2024-10-22 PROCEDURE — 99214 OFFICE O/P EST MOD 30 MIN: CPT

## 2024-10-22 PROCEDURE — 80048 BASIC METABOLIC PNL TOTAL CA: CPT

## 2024-10-22 PROCEDURE — 36415 COLL VENOUS BLD VENIPUNCTURE: CPT

## 2024-10-22 RX ORDER — FLUTICASONE PROPIONATE 50 MCG
1 SPRAY, SUSPENSION (ML) NASAL 2 TIMES DAILY
Qty: 16 ML | Refills: 5 | COMMUNITY
Start: 2024-10-22 | End: 2024-10-22

## 2024-10-22 NOTE — PROGRESS NOTES
[facility] PRE-OPERATIVE EVALUATION  [facility] PHYSICIAN GROUP - [facility]    NAME: Juan Manuel Nichole  AGE: 79 y.o. SEX: male  : 1945     DATE: 10/22/2024    [facility] Pre-Operative Evaluation      Chief Complaint: Pre-operative Evaluation     Surgery: Adjacent Tissue transfer   Anticipated Date of Surgery: 2024  Referring Provider: No ref. provider found       History of Present Illness:     Juan Manuel Nichole is a 79 y.o. male who presents to the office today for a preoperative consultation at the request of surgeon, Dr. Valverde, who plans on performing adjacent tissue on 2024. Planned anesthesia is general. Patient has a bleeding risk of: no recent abnormal bleeding. Patient does not have objections to receiving blood products if needed.      Assessment of Chronic Conditions:   - sick sinus rhythm - stable, follows with cardiology and has a pacemaker      Assessment of Cardiac Risk:  Denies unstable or severe angina or MI in the last 6 weeks or history of stent placement in the last year   Denies decompensated heart failure (e.g. New onset heart failure, NYHA functional class IV heart failure, or worsening existing heart failure)  Denies significant arrhythmias such as high grade AV block, symptomatic ventricular arrhythmia, newly recognized ventricular tachycardia, supraventricular tachycardia with resting heart rate >100, or symptomatic bradycardia  Denies severe heart valve disease including aortic stenosis or symptomatic mitral stenosis     Exercise Capacity:  Able to walk 4 blocks without symptoms?: Yes  Able to walk 2 flights without symptoms?: Yes    Prior Anesthesia Reactions: No     Personal history of venous thromboembolic disease? No    History of steroid use for >2 weeks within last year? No         Review of Systems:     Review of Systems   Constitutional:  Negative for chills and fever.   HENT:  Negative for ear pain and sore throat.    Eyes:   Negative for pain and visual disturbance.   Respiratory:  Negative for cough and shortness of breath.    Cardiovascular:  Negative for chest pain and palpitations.   Gastrointestinal:  Negative for abdominal pain and vomiting.   Genitourinary:  Negative for dysuria and hematuria.   Musculoskeletal:  Negative for arthralgias and back pain.   Skin:  Negative for color change and rash.   Neurological:  Negative for seizures and syncope.   All other systems reviewed and are negative.      Current Problem List:     Patient Active Problem List   Diagnosis    Prostate cancer (HCC)    Sick sinus syndrome (HCC)    Mixed hyperlipidemia    Pacemaker    Splenic lesion    Abnormal CT of the abdomen    Bronchitis    Narcolepsy    Pre-op evaluation       Allergies:     Allergies   Allergen Reactions    Chlorhexidine Hives and Rash       Current Medications:       Current Outpatient Medications:     atorvastatin (LIPITOR) 20 mg tablet, TAKE 1 TABLET BY MOUTH DAILY IN  THE EARLY MORNING, Disp: 90 tablet, Rfl: 3    cholecalciferol (VITAMIN D3) 1,000 units tablet, Take 1,000 Units by mouth daily, Disp: , Rfl:     Cyanocobalamin (B-12) 1000 MCG CAPS, Take by mouth daily, Disp: , Rfl:     ipratropium (ATROVENT) 0.06 % nasal spray, 1 spray into each nostril 3 (three) times a day, Disp: 45 mL, Rfl: 3    mupirocin (BACTROBAN) 2 % ointment, Apply topically daily Applied to the left nostril daily at bedtime, Disp: 22 g, Rfl: 1    Past Medical History:       Past Medical History:   Diagnosis Date    Cancer (HCC)     skin - L cheek carcinoma    Coronary artery disease I have a pacemaker    Elevated PSA     HL (hearing loss) Right ear - recently    Hyperlipidemia     Melanoma (HCC)     L cheek, surgically removed    Microcystic adenexal carcinoma- left eye     2/2023 removal with radiation after with [facility]    Narcolepsy     Pacemaker 1988    Symptomatic Bradycardia    Prostate cancer (HCC)     Snoring         Past Surgical History:   Procedure  Laterality Date    ABDOMINAL HERNIA REPAIR N/A 01/19/2023    Procedure: REPAIR HERNIA EPIGASTRIC OPEN;  Surgeon: Sue Mahmood MD;  Location: [facility];  Service: General    ATRIAL CARDIAC PACEMAKER INSERTION      BASAL CELL CARCINOMA EXCISION      CARDIAC CATHETERIZATION  02/12/2015    CARDIAC ELECTROPHYSIOLOGY PROCEDURE N/A 03/28/2023    Procedure: Cardiac pacer generator change;  Surgeon: Dev Vazquez DO;  Location: [facility];  Service: Cardiology    CARDIAC PACEMAKER PLACEMENT  04/2023    Battery replacement    COLONOSCOPY      EYE SURGERY Left 02/2023    removal of microcystic adenexal carcinoma with radiation after in [facility]    EYE SURGERY  11/10/2023    INSERTION OF FIDUCIAL MARKER (TRANSRECTAL ULTRASOUND GUIDANCE) N/A 02/03/2020    Procedure: INSERTION OF FIDUCIAL MARKER (TRANSRECTAL ULTRASOUND GUIDANCE) SPACEOAR;  Surgeon: Wayne Machuca MD;  Location: [facility];  Service: Urology    IR UPPER EXTREMITY VENOGRAM- DIAGNOSTIC  11/08/2022    [place] RPR 1ST INGUN HRNA AGE 5 YRS/> REDUCIBLE Left 12/20/2019    Procedure: INGUINAL HERNIA REPAIR;  Surgeon: Jalen Sosa DO;  Location: [facility];  Service: General    SKIN CANCER EXCISION      left cheek        Family History   Problem Relation Age of Onset    Coronary artery disease Mother     Coronary artery disease Father     Skin cancer Father     Hyperlipidemia Father     Cancer Father         Had bladder cancer    Heart disease Father         Open Heart Surgery    Coronary artery disease Brother     Heart disease Brother         Stint and Arrhythmia    Cancer Brother         Skin Cancer        Social History     Socioeconomic History    Marital status: /Civil Union     Spouse name: Not on file    Number of children: 3    Years of education: Not on file    Highest education level: Not on file   Occupational History    Not on file   Tobacco Use    Smoking status: Never    Smokeless tobacco: Never   Vaping Use    Vaping status: Never Used  "  Substance and Sexual Activity    Alcohol use: Yes     Alcohol/week: 7.0 standard drinks of alcohol     Types: 7 Glasses of wine per week     Comment: 1 glass beer/wine daily    Drug use: Never    Sexual activity: Not Currently     Partners: Female     Comment: medication not effective since radiation. Has ED.   Other Topics Concern    Not on file   Social History Narrative    Not on file     Social Determinants of Health     Financial Resource Strain: Low Risk  (10/18/2023)    Overall Financial Resource Strain (CARDIA)     Difficulty of Paying Living Expenses: Not hard at all   Food Insecurity: Not on file   Transportation Needs: No Transportation Needs (10/18/2023)    PRAPARE - Transportation     Lack of Transportation (Medical): No     Lack of Transportation (Non-Medical): No   Physical Activity: Not on file   Stress: Not on file   Social Connections: Not on file   Intimate Partner Violence: Not on file   Housing Stability: Not on file        Physical Exam:     /70 (BP Location: Left arm, Patient Position: Sitting, Cuff Size: Standard)   Pulse 63   Temp (!) 97.2 °F (36.2 °C) (Temporal)   Ht 5' 7.5\" (1.715 m)   Wt 66.3 kg (146 lb 3.2 oz)   SpO2 98%   BMI 22.56 kg/m²     Physical Exam  Constitutional:       Appearance: Normal appearance.   HENT:      Head: Normocephalic and atraumatic.      Mouth/Throat:      Mouth: Mucous membranes are moist.      Pharynx: Oropharynx is clear.   Cardiovascular:      Rate and Rhythm: Normal rate and regular rhythm.      Pulses: Normal pulses.      Heart sounds: Normal heart sounds.   Pulmonary:      Effort: Pulmonary effort is normal.      Breath sounds: Normal breath sounds.   Abdominal:      General: Bowel sounds are normal.      Palpations: Abdomen is soft.   Skin:     General: Skin is warm.   Neurological:      Mental Status: He is alert and oriented to person, place, and time.   Psychiatric:         Mood and Affect: Mood normal.          Data:     Pre-operative " work-up    Laboratory Results: I have personally reviewed the pertinent laboratory results/reports      EKG: EKG Results reviewed, Sinus rhythm with premature supraventricular complexes    Chest x-ray: Results Review Statement: No pertinent imaging studies reviewed.      Previous cardiopulmonary studies within the past year:  Echocardiogram: N/A  Cardiac Catheterization: N/A  Stress Test: N/A  Pulmonary Function Testing: N/A      Assessment & Recommendations:     No diagnosis found.    Pre-Op Evaluation Assessment  79 y.o. male with planned surgery: Adjacent Tissue transfer .    Known risk factors for perioperative complications: None  Sick sinus rhythm .        Current medications which may produce withdrawal symptoms if withheld perioperatively: N/A.    Pre-Op Evaluation Plan  1. Further preoperative workup as follows:   - None; no further preoperative work-up is required    2. Medication Management/Recommendations:   - None, continue medication regimen including morning of surgery, with sip of water    3. Prophylaxis for cardiac events with perioperative beta-blockers: not indicated.    4. Patient requires further consultation with: None    Clearance  Patient is Optimized for surgery without any additional cardiac testing.     Dayron Shepherd MD  [address] [address]  Phone#  [phone]  Fax#  [phone]

## 2024-10-23 ENCOUNTER — TELEPHONE (OUTPATIENT)
Age: 79
End: 2024-10-23

## 2024-10-23 NOTE — TELEPHONE ENCOUNTER
eGFR is okay, its mildly low actually but could be because he was a little dehydrated. We will monitor in 6 months. Nothing to be concerned about at this time.     Thank you,   Dr. Shepherd

## 2024-10-23 NOTE — TELEPHONE ENCOUNTER
Patient called in regards to being concerned with his egfr, patient stated that it came back high and would like to know if he should be concerned.

## 2024-10-30 ENCOUNTER — TELEPHONE (OUTPATIENT)
Age: 79
End: 2024-10-30

## 2024-10-30 ENCOUNTER — OFFICE VISIT (OUTPATIENT)
Dept: FAMILY MEDICINE CLINIC | Facility: CLINIC | Age: 79
End: 2024-10-30
Payer: MEDICARE

## 2024-10-30 VITALS
WEIGHT: 145.6 LBS | BODY MASS INDEX: 22.07 KG/M2 | RESPIRATION RATE: 18 BRPM | HEART RATE: 51 BPM | OXYGEN SATURATION: 97 % | TEMPERATURE: 98.2 F | HEIGHT: 68 IN | DIASTOLIC BLOOD PRESSURE: 76 MMHG | SYSTOLIC BLOOD PRESSURE: 128 MMHG

## 2024-10-30 DIAGNOSIS — Z00.00 MEDICARE ANNUAL WELLNESS VISIT, SUBSEQUENT: Primary | ICD-10-CM

## 2024-10-30 PROBLEM — J40 BRONCHITIS: Status: RESOLVED | Noted: 2023-01-03 | Resolved: 2024-10-30

## 2024-10-30 PROCEDURE — G0444 DEPRESSION SCREEN ANNUAL: HCPCS

## 2024-10-30 PROCEDURE — G0439 PPPS, SUBSEQ VISIT: HCPCS

## 2024-10-30 NOTE — TELEPHONE ENCOUNTER
Colette from [facility] called and would like pt recent BW and EKG for clearances faxed to office.    Fax: [phone]

## 2024-10-30 NOTE — PROGRESS NOTES
Ambulatory Visit  Name: Juan Manuel Nichole      : 1945      MRN: [id number]  Encounter Provider: Dayron Shepherd MD  Encounter Date: 10/30/2024   Encounter department: [facility]    Assessment & Plan  Medicare annual wellness visit, subsequent            Preventive health issues were discussed with patient, and age appropriate screening tests were ordered as noted in patient's After Visit Summary. Personalized health advice and appropriate referrals for health education or preventive services given if needed, as noted in patient's After Visit Summary.    History of Present Illness     Mr. Juan Manuel Nichole is a pleasant 79 year old male presenting today for the medicare annual wellness. He has no other complaints.     Patient Care Team:  Dayron Shepherd MD as PCP - General (Family Medicine)  Fanny Cast MD (Radiation Oncology)  Kostas Ruth MD (Urology)    Review of Systems   Constitutional:  Negative for chills and fever.   HENT:  Negative for ear pain and sore throat.    Eyes:  Negative for pain and visual disturbance.   Respiratory:  Negative for cough and shortness of breath.    Cardiovascular:  Negative for chest pain and palpitations.   Gastrointestinal:  Negative for abdominal pain and vomiting.   Genitourinary:  Negative for dysuria and hematuria.   Musculoskeletal:  Negative for arthralgias and back pain.   Skin:  Negative for color change and rash.   Neurological:  Negative for seizures and syncope.   All other systems reviewed and are negative.    Medical History Reviewed by provider this encounter:  Tobacco  Allergies  Meds  Problems  Med Hx  Surg Hx  Fam Hx       Annual Wellness Visit Questionnaire   Juan Manuel is here for his Subsequent Wellness visit.     Health Risk Assessment:   Patient rates overall health as very good. Patient feels that their physical health rating is same. Patient is satisfied with their life. Eyesight was rated as  same. Hearing was rated as same. Patient feels that their emotional and mental health rating is same. Patients states they are sometimes angry. Patient states they are sometimes unusually tired/fatigued. Pain experienced in the last 7 days has been none. Patient states that he has experienced no weight loss or gain in last 6 months.     Depression Screening:   PHQ-2 Score: 0      Fall Risk Screening:   In the past year, patient has experienced: no history of falling in past year      Home Safety:  Patient does not have trouble with stairs inside or outside of their home. Patient has working smoke alarms and has working carbon monoxide detector. Home safety hazards include: none.     Nutrition:   Current diet is Regular.     Medications:   Patient is currently taking over-the-counter supplements. OTC medications include: see medication list. Patient is able to manage medications.     Activities of Daily Living (ADLs)/Instrumental Activities of Daily Living (IADLs):   Walk and transfer into and out of bed and chair?: Yes  Dress and groom yourself?: Yes    Bathe or shower yourself?: Yes    Feed yourself? Yes  Do your laundry/housekeeping?: No  Manage your money, pay your bills and track your expenses?: Yes  Make your own meals?: Yes    Do your own shopping?: Yes    Previous Hospitalizations:   Any hospitalizations or ED visits within the last 12 months?: No      Advance Care Planning:   Living will: Yes    Durable POA for healthcare: Yes    Advanced directive: Yes    Advanced directive counseling given: Yes      PREVENTIVE SCREENINGS      Cardiovascular Screening:    General: Screening Not Indicated and History Lipid Disorder      Diabetes Screening:     General: Screening Current      Colorectal Cancer Screening:     General: Screening Current      Prostate Cancer Screening:    General: History Prostate Cancer and Screening Not Indicated      Lung Cancer Screening:     General: Screening Not Indicated      Hepatitis C  "Screening:    General: Screening Current    Social Determinants of Health     Financial Resource Strain: Low Risk  (10/18/2023)    Overall Financial Resource Strain (CARDIA)     Difficulty of Paying Living Expenses: Not hard at all   Food Insecurity: No Food Insecurity (10/30/2024)    Hunger Vital Sign     Worried About Running Out of Food in the Last Year: Never true     Ran Out of Food in the Last Year: Never true   Transportation Needs: No Transportation Needs (10/30/2024)    PRAPARE - Transportation     Lack of Transportation (Medical): No     Lack of Transportation (Non-Medical): No   Housing Stability: Unknown (10/30/2024)    Housing Stability Vital Sign     Unable to Pay for Housing in the Last Year: No     Homeless in the Last Year: No   Utilities: Not At Risk (10/30/2024)    [facility] Utilities     Threatened with loss of utilities: No     No results found.    Objective     /76 (BP Location: Left arm, Patient Position: Sitting, Cuff Size: Standard)   Pulse (!) 51   Temp 98.2 °F (36.8 °C) (Temporal)   Resp 18   Ht 5' 8\" (1.727 m)   Wt 66 kg (145 lb 9.6 oz)   SpO2 97%   BMI 22.14 kg/m²     Physical Exam  Vitals and nursing note reviewed.   Constitutional:       General: He is not in acute distress.     Appearance: He is well-developed.   HENT:      Head: Normocephalic and atraumatic.   Eyes:      Conjunctiva/sclera: Conjunctivae normal.   Cardiovascular:      Rate and Rhythm: Normal rate and regular rhythm.      Heart sounds: No murmur heard.  Pulmonary:      Effort: Pulmonary effort is normal. No respiratory distress.      Breath sounds: Normal breath sounds.   Abdominal:      Palpations: Abdomen is soft.      Tenderness: There is no abdominal tenderness.   Musculoskeletal:         General: No swelling.      Cervical back: Neck supple.   Skin:     General: Skin is warm and dry.      Capillary Refill: Capillary refill takes less than 2 seconds.   Neurological:      Mental Status: He is alert. "   Psychiatric:         Mood and Affect: Mood normal.

## 2024-11-20 ENCOUNTER — APPOINTMENT (OUTPATIENT)
Dept: LAB | Facility: HOSPITAL | Age: 79
End: 2024-11-20
Payer: MEDICARE

## 2024-11-20 DIAGNOSIS — C61 PROSTATE CANCER (HCC): ICD-10-CM

## 2024-11-20 LAB — PSA SERPL-MCNC: 0.17 NG/ML (ref 0–4)

## 2024-11-20 PROCEDURE — 84153 ASSAY OF PSA TOTAL: CPT

## 2024-11-20 PROCEDURE — 36415 COLL VENOUS BLD VENIPUNCTURE: CPT

## 2024-12-09 ENCOUNTER — OFFICE VISIT (OUTPATIENT)
Dept: RADIATION ONCOLOGY | Facility: CLINIC | Age: 79
End: 2024-12-09
Attending: INTERNAL MEDICINE
Payer: MEDICARE

## 2024-12-09 VITALS
DIASTOLIC BLOOD PRESSURE: 64 MMHG | HEIGHT: 68 IN | SYSTOLIC BLOOD PRESSURE: 125 MMHG | RESPIRATION RATE: 16 BRPM | HEART RATE: 55 BPM | BODY MASS INDEX: 22.16 KG/M2 | TEMPERATURE: 96.6 F | WEIGHT: 146.2 LBS | OXYGEN SATURATION: 98 %

## 2024-12-09 DIAGNOSIS — C61 PROSTATE CANCER (HCC): Primary | ICD-10-CM

## 2024-12-09 PROCEDURE — 99213 OFFICE O/P EST LOW 20 MIN: CPT | Performed by: INTERNAL MEDICINE

## 2024-12-09 PROCEDURE — 99211 OFF/OP EST MAY X REQ PHY/QHP: CPT | Performed by: INTERNAL MEDICINE

## 2024-12-09 NOTE — PROGRESS NOTES
Follow-up Visit   Name: Juan Manuel Nichole      : 1945      MRN: [id number]  Encounter Provider: Teresita Keller MD  Encounter Date: 2024   Encounter department: [facility] RADIATION ONCOLOGY  :  Assessment & Plan  Prostate cancer (HCC)  Juan Manuel Nichole is a 79 y.o. male former patient of Dr. Cast who completed definitive radiation therapy for prostate cancer completing in 2020.     He presents for routine f/u now 4 years and 8 months after ocmpletion of therapy. He has not late side effects related to radiation. His PSA remains low at 0.166, last ordered by Dr. Cast at 1 year interval after prior PSA of 0.13 on 11/15/23. He has not seen Urology recently, prior note from 2022 recommended f/u in 1 year. Have encouraged patient to re-establish.   Reviewed standard post-RT follow-up interval of 5 years. Offered patient repeat PSA and f/u visit in 1 year, he prefers to instead re-establish with Urology and expressed that he would contact them himself. He knows we remain available to order this PSA should he have difficulty reestablishing urologic care.  I stressed the importance of compliance with PSA surveillance, he understands.            History of Present Illness   Chief Complaint   Patient presents with    Follow-up   Pertinent Medical History   Juan Manuel Nichole 1945 is a 79 y.o. male with adenocarcinoma Morenci score 7 (3+ 4) of the prostate. He was initially diagnosed with Morenci 6 (3+3) prostate cancer in 2018 and was on active surveillance. He had repeat prostate biopsy 2019, revealing Hi 7 (3+4) disease. He completed a course of definitive radiation therapy to the prostate on 2020. He was last seen in radiation oncology with Dr. Cast on 23       PSA   Latest Ref Rng 0.000 - 4.000 ng/mL   2020 1.6    2020 0.5    2021 0.3    2021 0.2    2022 0.2    2023 0.15    11/15/2023 0.13    2024 0.166      He feels well  overall. He has some frequency and mild nocturia. His IPSS score is 13. No bowel issues.      Oncology History   Oncology History   Prostate cancer (HCC)   11/5/2018 Initial Diagnosis    Prostate cancer (HCC)     11/5/2018 Biopsy    A. Prostate, Right Peripheral Zone, Biopsy:  - Prostatic adenocarcinoma, Hi Pattern 3 + 3 (Hi Score 6), Grade Group 1; involving 4 of 6 cores, 20.5 linear mm of carcinoma (approximately 50% of total tissue).     B. Prostate, Right Central Zone, Biopsy:  - Prostatic adenocarcinoma, Hi Pattern 3 + 3 (Malone Score 6), Grade Group 1; involving 1 of 2 cores, 9 linear mm of carcinoma (approximately 30% of total tissue).     C. Prostate, Left Peripheral Zone, Biopsy:  - Atypical small acinar proliferation (ASAP), focally present in 1 of 2 cores.     D. Prostate, Left Central Zone, Biopsy:  - Prostatic adenocarcinoma, Hi Pattern 3 + 3 (Malone Score 6), Grade Group 1; discontinuously involving 4 of 4 cores, 9 linear mm of carcinoma (approximately 20% of total tissue).  - High-grade prostatic intraepithelial neoplasia (HGPIN).    Dr Kostas Ruth       9/12/2019 Biopsy    A. Right lateral base prostate core biopsy:  - Adenocarcinoma, acinar type, Hi score 3+4=7, Grade Group 2  - Pattern 4 - 5% of tumor  - Continuously involving 80% of core  - No perineural invasion seen     B. Right lateral mid prostate core biopsy:  - Benign prostatic tissue     C. Right lateral apex prostate core biopsy:  - Benign prostatic tissue     D. Right medial base prostate core biopsy:  - Adenocarcinoma, acinar type, Malone score 3+4=7, Grade Group 2  - Pattern 4 - 10% of tumor  - Discontinuously involving 45% of core  - Perineural invasion is seen     E. Right medial mid prostate core biopsy:  - Adenocarcinoma, acinar type, Hi score 3+4=7, Grade Group 2  - Pattern 4 - 10% of tumor  - Continuously involving 10% of core  - No perineural invasion seen     F. Right medial apex prostate  "core biopsy:  - Benign prostatic tissue     G. Left medial base  Prostate core biopsy:  - Adenocarcinoma, acinar type, Raleigh score 3+4=7, Grade Group 2  - Pattern 4 - 25% of tumor  - Continuously involving 5% of core  - No perineural invasion seen     H. Left medial mid prostate core biopsy:  - Benign prostatic tissue     I. Left medial apex prostate core bopsy:  - Benign prostatic tissue     J. Left lateral base prostate core biopsy:   - Adenocarcinoma, acinar type, Raleigh score 3+4=7, Grade Group 2  - Pattern 4 - 20% of tumor  - Discontinuously involving 25% of core  - No perineural invasion seen     K. Left lateral mid prostate core biopsy:   - Adenocarcinoma, acinar type, Raleigh score 3+4=7, Grade Group 2  - Pattern 4 - 5% of tumor  - Continuously involving 10% of core  - No perineural invasion seen     L. Left lateral apex prostate core biopsy:  - Benign prostatic tissue     2/20/2020 - 4/22/2020 Radiation    Plan ID Energy Fractions Dose per Fraction (cGy) Dose Correction (cGy) Total Dose Delivered (cGy) Elapsed Days   Prost ProxSV 10X 31 / 44 180 0 5,580 43   Rev ProstPSV 10X 13 / 13 180 0 2,340 16             Review of Systems Refer to nursing note.          Objective   /64 (BP Location: Left arm)   Pulse 55   Temp (!) 96.6 °F (35.9 °C) (Temporal)   Resp 16   Ht 5' 8\" (1.727 m)   Wt 66.3 kg (146 lb 3.2 oz)   SpO2 98%   BMI 22.23 kg/m²     Pain Screening:  Pain Score: 0-No pain  ECOG    Physical Exam   General Appearance:  Alert, cooperative, no distress, appears stated age  HEENT: normocephalic/atraumatic  Lungs: Respirations unlabored, no cyanosis, able to speak in complete sentences without dyspnea.  Abdomen: Non-distended  Skin: No generalized rash or dermatitis  Neurologic: ANOx3, speech and cognition intact.    Administrative Statements   I have spent a total time of 20 minutes in caring for this patient on the day of the visit/encounter including Patient and family education, " "Importance of tx compliance, Counseling / Coordination of care, and Documenting in the medical record.   Portions of the record may have been created with voice recognition software.  Occasional wrong word or \"sound a like\" substitutions may have occurred due to the inherent limitations of voice recognition software.  Read the chart carefully and recognize, using context, where substitutions have occurred.  "

## 2024-12-09 NOTE — PROGRESS NOTES
Juan Manuel Nichole 1945 is a 79 y.o. male with adenocarcinoma Hi score 7 (3+ 4) of the prostate. He was initially diagnosed with Hi 6 (3+3) prostate cancer in 11/2018 and was on active surveillance. He had repeat prostate biopsy Sept 2019, revealing Hi 7 (3+4) disease. He completed a course of definitive radiation therapy to the prostate on 4/22/2020. He was last seen in radiation oncology with Dr. Cast on 12/11/23.     PSA   Latest Ref Rng 0.000 - 4.000 ng/mL   7/22/2020 1.6    11/23/2020 0.5    5/24/2021 0.3    12/6/2021 0.2    11/8/2022 0.2    9/1/2023 0.15    11/15/2023 0.13    11/20/2024 0.166      No recent or upcoming urology appointments found in system    Follow up visit     Oncology History Overview Note   with adenocarcinoma Hi score 7 (3+ 4) of the prostate. He was initially diagnosed with Hi 6 (3+3) prostate cancer in 11/2018 and was on active surveillance. He had repeat prostate biopsy Sept 2019, revealing [place] 7 (3+4) disease. He completed a course of definitive radiation therapy to the prostate on 4/22/2020. He was last seen in radiation oncology with Dr. Cast on 12/11/23     PSA   Latest Ref Rng 0.000 - 4.000 ng/mL   7/22/2020 1.6    11/23/2020 0.5    5/24/2021 0.3    12/6/2021 0.2    11/8/2022 0.2    9/1/2023 0.15    11/15/2023 0.13    11/20/2024 0.166      No recent or upcoming urology appointments found in system     Prostate cancer (HCC)   11/5/2018 Initial Diagnosis    Prostate cancer (HCC)     11/5/2018 Biopsy    A. Prostate, Right Peripheral Zone, Biopsy:  - Prostatic adenocarcinoma, Hi Pattern 3 + 3 (Hi Score 6), Grade Group 1; involving 4 of 6 cores, 20.5 linear mm of carcinoma (approximately 50% of total tissue).     B. Prostate, Right Central Zone, Biopsy:  - Prostatic adenocarcinoma, Hi Pattern 3 + 3 (Hi Score 6), Grade Group 1; involving 1 of 2 cores, 9 linear mm of carcinoma (approximately 30% of total tissue).     C. Prostate, Left  Peripheral Zone, Biopsy:  - Atypical small acinar proliferation (ASAP), focally present in 1 of 2 cores.     D. Prostate, Left Central Zone, Biopsy:  - Prostatic adenocarcinoma, Delbarton Pattern 3 + 3 (Delbarton Score 6), Grade Group 1; discontinuously involving 4 of 4 cores, 9 linear mm of carcinoma (approximately 20% of total tissue).  - High-grade prostatic intraepithelial neoplasia (HGPIN).    Dr Kostas Ruth       9/12/2019 Biopsy    A. Right lateral base prostate core biopsy:  - Adenocarcinoma, acinar type, Hi score 3+4=7, Grade Group 2  - Pattern 4 - 5% of tumor  - Continuously involving 80% of core  - No perineural invasion seen     B. Right lateral mid prostate core biopsy:  - Benign prostatic tissue     C. Right lateral apex prostate core biopsy:  - Benign prostatic tissue     D. Right medial base prostate core biopsy:  - Adenocarcinoma, acinar type, Delbarton score 3+4=7, Grade Group 2  - Pattern 4 - 10% of tumor  - Discontinuously involving 45% of core  - Perineural invasion is seen     E. Right medial mid prostate core biopsy:  - Adenocarcinoma, acinar type, Delbarton score 3+4=7, Grade Group 2  - Pattern 4 - 10% of tumor  - Continuously involving 10% of core  - No perineural invasion seen     F. Right medial apex prostate core biopsy:  - Benign prostatic tissue     G. Left medial base  Prostate core biopsy:  - Adenocarcinoma, acinar type, Delbarton score 3+4=7, Grade Group 2  - Pattern 4 - 25% of tumor  - Continuously involving 5% of core  - No perineural invasion seen     H. Left medial mid prostate core biopsy:  - Benign prostatic tissue     I. Left medial apex prostate core bopsy:  - Benign prostatic tissue     J. Left lateral base prostate core biopsy:   - Adenocarcinoma, acinar type, Hi score 3+4=7, Grade Group 2  - Pattern 4 - 20% of tumor  - Discontinuously involving 25% of core  - No perineural invasion seen     K. Left lateral mid prostate core biopsy:   - Adenocarcinoma, acinar type,  Hi score 3+4=7, Grade Group 2  - Pattern 4 - 5% of tumor  - Continuously involving 10% of core  - No perineural invasion seen     L. Left lateral apex prostate core biopsy:  - Benign prostatic tissue     2/20/2020 - 4/22/2020 Radiation    Plan ID Energy Fractions Dose per Fraction (cGy) Dose Correction (cGy) Total Dose Delivered (cGy) Elapsed Days   Prost ProxSV 10X 31 / 44 180 0 5,580 43   Rev ProstPSV 10X 13 / 13 180 0 2,340 16              Review of Systems:  Review of Systems   Constitutional:  Negative for activity change, appetite change and fatigue.   HENT:  Positive for congestion.    Eyes: Negative.    Respiratory:  Positive for cough.    Cardiovascular: Negative.    Gastrointestinal: Negative.    Endocrine: Negative.    Genitourinary:  Positive for frequency. Negative for difficulty urinating, dysuria, hematuria and urgency.   Musculoskeletal: Negative.    Skin: Negative.    Allergic/Immunologic: Negative.    Neurological: Negative.    Hematological: Negative.    Psychiatric/Behavioral: Negative.         Clinical Trial: no    IPSS Questionnaire (AUA-7):  Over the past month…    1)  How often have you had a sensation of not emptying your bladder completely after you finish urinating?  0 - Not at all   2)  How often have you had to urinate again less than two hours after you finished urinating? 5 - Almost always   3)  How often have you found you stopped and started again several times when you urinated?  2 - Less than half the time   4) How difficult have you found it to postpone urination?  0 - Not at all   5) How often have you had a weak urinary stream?  1 - Less than 1 time in 5   6) How often have you had to push or strain to begin urination?  2 - Less than half the time   7) How many times did you most typically get up to urinate from the time you went to bed until the time you got up in the morning?  3 - 3 times   Total Score:  13       Teaching completed     Health Maintenance   Topic Date Due     RSV Vaccine Age 60+ Years (1 - 1-dose 75+ series) Never done    Fall Risk  10/30/2025    Depression Screening  10/30/2025    Medicare Annual Wellness Visit (AWV)  10/30/2025    BMI: Adult  10/30/2025    Colorectal Cancer Screening  10/05/2031    Hepatitis C Screening  Completed    Zoster Vaccine  Completed    Pneumococcal Vaccine: 65+ Years  Completed    Influenza Vaccine  Completed    COVID-19 Vaccine  Completed    RSV Vaccine age 0-20 Months  Aged Out    HIB Vaccine  Aged Out    IPV Vaccine  Aged Out    Hepatitis A Vaccine  Aged Out    Meningococcal ACWY Vaccine  Aged Out    HPV Vaccine  Aged Out     Patient Active Problem List   Diagnosis    Prostate cancer (HCC)    Sick sinus syndrome (HCC)    Mixed hyperlipidemia    Pacemaker    Splenic lesion    Abnormal CT of the abdomen    Narcolepsy    Pre-op evaluation     Past Medical History:   Diagnosis Date    Cancer (HCC)     skin - L cheek carcinoma    Coronary artery disease I have a pacemaker    Elevated PSA     HL (hearing loss) Right ear - recently    Hyperlipidemia     Melanoma (HCC)     L cheek, surgically removed    Microcystic adenexal carcinoma- left eye     [facility]    Narcolepsy     Pacemaker 1988    Symptomatic Bradycardia    Prostate cancer (HCC)     Snoring      Past Surgical History:   Procedure Laterality Date    ABDOMINAL HERNIA REPAIR N/A 01/19/2023    Procedure: REPAIR HERNIA EPIGASTRIC OPEN;  Surgeon: Sue Mahmood MD;  [facility];  Service: General    ATRIAL CARDIAC PACEMAKER INSERTION      BASAL CELL CARCINOMA EXCISION      CARDIAC CATHETERIZATION  02/12/2015    CARDIAC ELECTROPHYSIOLOGY PROCEDURE N/A 03/28/2023    Procedure: Cardiac pacer generator change;  Surgeon: Dev Vazquez DO;  [facility];  Service: Cardiology    CARDIAC PACEMAKER PLACEMENT  04/2023    Battery replacement    COLONOSCOPY      EYE SURGERY Left 02/2023    removal of microcystic adenexal carcinoma with  radiation after in [facility]    EYE SURGERY  11/10/2023    INSERTION OF FIDUCIAL MARKER (TRANSRECTAL ULTRASOUND GUIDANCE) N/A 02/03/2020    Procedure: INSERTION OF FIDUCIAL MARKER (TRANSRECTAL ULTRASOUND GUIDANCE) SPACEOAR;  Surgeon: Wayne Machuca MD;  [facility];  Service: Urology    IR UPPER EXTREMITY VENOGRAM- DIAGNOSTIC  11/08/2022    KS RPR 1ST INGUN HRNA AGE 5 YRS/> REDUCIBLE Left 12/20/2019    Procedure: INGUINAL HERNIA REPAIR;  Surgeon: Jalen Sosa DO;  [facility];  Service: General    SKIN CANCER EXCISION      left cheek     Family History   Problem Relation Age of Onset    Coronary artery disease Mother     Coronary artery disease Father     Skin cancer Father     Hyperlipidemia Father     Cancer Father         Had bladder cancer    Heart disease Father         Open Heart Surgery    Coronary artery disease Brother     Heart disease Brother         Stint and Arrhythmia    Cancer Brother         Skin Cancer     Social History     Socioeconomic History    Marital status: /Civil Union     Spouse name: Not on file    Number of children: 3    Years of education: Not on file    Highest education level: Not on file   Occupational History    Not on file   Tobacco Use    Smoking status: Never    Smokeless tobacco: Never   Vaping Use    Vaping status: Never Used   Substance and Sexual Activity    Alcohol use: Yes     Alcohol/week: 7.0 standard drinks of alcohol     Types: 7 Glasses of wine per week     Comment: 1 glass beer/wine daily    Drug use: Never    Sexual activity: Not Currently     Partners: Female     Comment: medication not effective since radiation. Has ED.   Other Topics Concern    Not on file   Social History Narrative    Not on file     Social Drivers of Health     Financial Resource Strain: Low Risk  (10/18/2023)    Overall Financial Resource Strain (CARDIA)     Difficulty of Paying Living Expenses: Not hard at all   Food Insecurity: No Food Insecurity (10/30/2024)     "Hunger Vital Sign     Worried About Running Out of Food in the Last Year: Never true     Ran Out of Food in the Last Year: Never true   Transportation Needs: No Transportation Needs (10/30/2024)    PRAPARE - Transportation     Lack of Transportation (Medical): No     Lack of Transportation (Non-Medical): No   Physical Activity: Not on file   Stress: Not on file   Social Connections: Not on file   Intimate Partner Violence: Not on file   Housing Stability: Unknown (10/30/2024)    Housing Stability Vital Sign     Unable to Pay for Housing in the Last Year: No     Number of Times Moved in the Last Year: Not on file     Homeless in the Last Year: No       Current Outpatient Medications:     atorvastatin (LIPITOR) 20 mg tablet, TAKE 1 TABLET BY MOUTH DAILY IN  THE EARLY MORNING, Disp: 90 tablet, Rfl: 3    cholecalciferol (VITAMIN D3) 1,000 units tablet, Take 1,000 Units by mouth daily, Disp: , Rfl:     Cyanocobalamin (B-12) 1000 MCG CAPS, Take by mouth daily, Disp: , Rfl:     ipratropium (ATROVENT) 0.06 % nasal spray, 1 spray into each nostril 3 (three) times a day, Disp: 45 mL, Rfl: 3    mupirocin (BACTROBAN) 2 % ointment, Apply topically daily Applied to the left nostril daily at bedtime, Disp: 22 g, Rfl: 1  Allergies   Allergen Reactions    Chlorhexidine Hives and Rash     Vitals:    12/09/24 0805   BP: 125/64   BP Location: Left arm   Pulse: 55   Resp: 16   Temp: (!) 96.6 °F (35.9 °C)   TempSrc: Temporal   SpO2: 98%   Weight: 66.3 kg (146 lb 3.2 oz)   Height: 5' 8\" (1.727 m)      Pain Score: 0-No pain  "

## 2024-12-09 NOTE — ASSESSMENT & PLAN NOTE
Juan Manuel Nichole is a 79 y.o. male former patient of Dr. Cast who completed definitive radiation therapy for prostate cancer completing in April 2020.     He presents for routine f/u now 4 years and 8 months after ocmpletion of therapy. He has not late side effects related to radiation. His PSA remains low at 0.166, last ordered by Dr. Cast at 1 year interval after prior PSA of 0.13 on 11/15/23. He has not seen Urology recently, prior note from March 2022 recommended f/u in 1 year. Have encouraged patient to re-establish.   Reviewed standard post-RT follow-up interval of 5 years. Offered patient repeat PSA and f/u visit in 1 year, he prefers to instead re-establish with Urology and expressed that he would contact them himself. He knows we remain available to order this PSA should he have difficulty reestablishing urologic care.  I stressed the importance of compliance with PSA surveillance, he understands.           Vitals completed successfully and medication reconciled. Kelly Dupree MA  Chief Complaint   Patient presents with     Follow Up      6 Mo F/U

## 2024-12-18 ENCOUNTER — REMOTE DEVICE CLINIC VISIT (OUTPATIENT)
Dept: CARDIOLOGY CLINIC | Facility: CLINIC | Age: 79
End: 2024-12-18
Payer: MEDICARE

## 2024-12-18 ENCOUNTER — RESULTS FOLLOW-UP (OUTPATIENT)
Dept: NON INVASIVE DIAGNOSTICS | Facility: HOSPITAL | Age: 79
End: 2024-12-18

## 2024-12-18 DIAGNOSIS — Z95.0 PRESENCE OF PERMANENT CARDIAC PACEMAKER: Primary | ICD-10-CM

## 2024-12-18 PROCEDURE — 93294 REM INTERROG EVL PM/LDLS PM: CPT | Performed by: STUDENT IN AN ORGANIZED HEALTH CARE EDUCATION/TRAINING PROGRAM

## 2024-12-18 PROCEDURE — 93296 REM INTERROG EVL PM/IDS: CPT | Performed by: STUDENT IN AN ORGANIZED HEALTH CARE EDUCATION/TRAINING PROGRAM

## 2024-12-18 NOTE — PROGRESS NOTES
SJM DC PM (DDD MODE) - NOT MRI CONDITIONAL   MERLIN TRANSMISSION:  BATTERY VOLTAGE ADEQUATE (6.3-9.1 YR.).  AP 25%  <1%.  ALL LEAD PARAMETERS WITHIN NORMAL LIMITS.  31 AMS  NOISE REVERSION EPISODES.  EGMS SHOW BRIEF AT AND NOISE OVERSENSED PRIMARILY ON THE VENTRICULAR CHANNEL (PROGRAMMED UNIPOLAR, KNOWN HX OF BIPOLAR IMP. > 3 KOHMS).  NORMAL DEVICE FUNCTION.  RG

## 2025-01-14 ENCOUNTER — TELEPHONE (OUTPATIENT)
Dept: CARDIOLOGY CLINIC | Facility: CLINIC | Age: 80
End: 2025-01-14

## 2025-03-07 ENCOUNTER — NURSE TRIAGE (OUTPATIENT)
Age: 80
End: 2025-03-07

## 2025-03-07 NOTE — TELEPHONE ENCOUNTER
"Dorys to give blood today and BP and systolic was 177 and then 198  Reason for Disposition   Systolic BP >= 160 OR Diastolic >= 100    Answer Assessment - Initial Assessment Questions  1. BLOOD PRESSURE: \"What is your blood pressure?\" \"Did you take at least two measurements 5 minutes apart?\"      States that he went to give blood earlier today and his BP was elevated.  Reports that his systolic BP was 177 and then on recheck was 198.  He does not remember what the diastolic readings were  2. ONSET: \"When did you take your blood pressure?\"      Has it checked earlier today when he went to give blood.  3. HOW: \"How did you take your blood pressure?\" (e.g., automatic home BP monitor, visiting nurse)      Was checked by a nurse.  He does not have a BP cuff in the home so he is unable to check his BP at home.   4. HISTORY: \"Do you have a history of high blood pressure?\"      Denies  5. MEDICINES: \"Are you taking any medicines for blood pressure?\" \"Have you missed any doses recently?\"      Denies  6. OTHER SYMPTOMS: \"Do you have any symptoms?\" (e.g., blurred vision, chest pain, difficulty breathing, headache, weakness)      Denies any symptoms    Protocols used: Blood Pressure - High-Adult-OH     FOLLOW UP: Patient scheduled for appointment on Monday 3/10    REASON FOR CONVERSATION: Hypertension    SYMPTOMS: Denies any symptoms    OTHER:  Had BP checked today when he went to donate blood.  Reports BP was elevated. Does not remember exact readings but states that systolic was 177 and then 198.  At pulmonary appointment on 3/4 BP was 124/64.  He does not have a BP cuff in the home to check his pressure.     DISPOSITION: See Within 3 Days in Office    "

## 2025-03-10 ENCOUNTER — OFFICE VISIT (OUTPATIENT)
Dept: FAMILY MEDICINE CLINIC | Facility: CLINIC | Age: 80
End: 2025-03-10
Payer: MEDICARE

## 2025-03-10 VITALS
OXYGEN SATURATION: 98 % | RESPIRATION RATE: 18 BRPM | TEMPERATURE: 98.2 F | BODY MASS INDEX: 22.43 KG/M2 | HEIGHT: 68 IN | WEIGHT: 148 LBS | SYSTOLIC BLOOD PRESSURE: 128 MMHG | HEART RATE: 66 BPM | DIASTOLIC BLOOD PRESSURE: 70 MMHG

## 2025-03-10 DIAGNOSIS — R03.0 ELEVATED BP WITHOUT DIAGNOSIS OF HYPERTENSION: Primary | ICD-10-CM

## 2025-03-10 PROCEDURE — G2211 COMPLEX E/M VISIT ADD ON: HCPCS

## 2025-03-10 PROCEDURE — 99213 OFFICE O/P EST LOW 20 MIN: CPT

## 2025-03-10 NOTE — PROGRESS NOTES
Name: Juan Manuel Nichole      : 1945      MRN: [id number]  Encounter Provider: Dayron Shepherd MD  Encounter Date: 3/10/2025   Encounter department: [facility]    Assessment & Plan  Elevated BP without diagnosis of hypertension  Episode of elevated BP of 177 and 186 systolics when going to go give blood.   Within normal range today   No additional caffeine or salt intake  Monitor Bps for next two weeks  Come in for nurse visit in 2 weeks            History of Present Illness     Juan Manuel Nichole is a 79 y.o. male with pmhx of HLD presenting today episode of elevated BP.     Episode of elevated BP of 177 and 186 systolics when going to donate blood.       Review of Systems   Constitutional:  Negative for chills and fever.   HENT:  Negative for ear pain and sore throat.    Eyes:  Negative for pain and visual disturbance.   Respiratory:  Negative for cough and shortness of breath.    Cardiovascular:  Negative for chest pain and palpitations.   Gastrointestinal:  Negative for abdominal pain and vomiting.   Genitourinary:  Negative for dysuria and hematuria.   Musculoskeletal:  Negative for arthralgias and back pain.   Skin:  Negative for color change and rash.   Neurological:  Negative for seizures and syncope.   All other systems reviewed and are negative.    Past Medical History:   Diagnosis Date   • Cancer (HCC)     skin - L cheek carcinoma   • Coronary artery disease I have a pacemaker   • Elevated PSA    • HL (hearing loss) Right ear - recently   • Hyperlipidemia    • Melanoma (HCC)     L cheek, surgically removed   • Microcystic adenexal carcinoma- left eye     2023 removal with radiation after with [facility]   • Narcolepsy    • Pacemaker     Symptomatic Bradycardia   • Prostate cancer (HCC)    • Snoring      Past Surgical History:   Procedure Laterality Date   • ABDOMINAL HERNIA REPAIR N/A 2023    Procedure: REPAIR HERNIA EPIGASTRIC OPEN;  Surgeon: Sue Mahmood MD;   [facility];  Service: General   • ATRIAL CARDIAC PACEMAKER INSERTION     • BASAL CELL CARCINOMA EXCISION     • CARDIAC CATHETERIZATION  02/12/2015   • CARDIAC ELECTROPHYSIOLOGY PROCEDURE N/A 03/28/2023    Procedure: Cardiac pacer generator change;  Surgeon: Dev Vazquez DO;  [facility];  Service: Cardiology   • CARDIAC PACEMAKER PLACEMENT  04/2023    Battery replacement   • COLONOSCOPY     • EYE SURGERY Left 02/2023    removal of microcystic adenexal carcinoma with radiation after in [facility]   • EYE SURGERY  11/10/2023   • INSERTION OF FIDUCIAL MARKER (TRANSRECTAL ULTRASOUND GUIDANCE) N/A 02/03/2020    Procedure: INSERTION OF FIDUCIAL MARKER (TRANSRECTAL ULTRASOUND GUIDANCE) SPACEOAR;  Surgeon: Wayne Machuca MD;  [facility];  Service: Urology   • IR UPPER EXTREMITY VENOGRAM- DIAGNOSTIC  11/08/2022   • [place] RPR 1ST INGUN HRNA AGE 5 YRS/> REDUCIBLE Left 12/20/2019    Procedure: INGUINAL HERNIA REPAIR;  Surgeon: Jalen Sosa DO;  [facility];  Service: General   • SKIN CANCER EXCISION      left cheek     Family History   Problem Relation Age of Onset   • Coronary artery disease Mother    • Coronary artery disease Father    • Skin cancer Father    • Hyperlipidemia Father    • Cancer Father         Had bladder cancer   • Heart disease Father         Open Heart Surgery   • Coronary artery disease Brother    • Heart disease Brother         Stint and Arrhythmia   • Cancer Brother         Skin Cancer     Social History     Tobacco Use   • Smoking status: Never   • Smokeless tobacco: Never   Vaping Use   • Vaping status: Never Used   Substance and Sexual Activity   • Alcohol use: Yes     Alcohol/week: 7.0 standard drinks of alcohol     Types: 7 Glasses of wine per week     Comment: 1 glass beer/wine daily   • Drug use: Never   • Sexual activity: Not Currently     Partners: Female     Comment: medication not effective since radiation. Has ED.     Current Outpatient  "Medications on File Prior to Visit   Medication Sig   • atorvastatin (LIPITOR) 20 mg tablet TAKE 1 TABLET BY MOUTH DAILY IN  THE EARLY MORNING   • azelastine (ASTELIN) 0.1 % nasal spray 1 spray into each nostril 2 (two) times a day Use in each nostril as directed   • cholecalciferol (VITAMIN D3) 1,000 units tablet Take 1,000 Units by mouth daily   • Cyanocobalamin (B-12) 1000 MCG CAPS Take by mouth daily   • ipratropium (ATROVENT) 0.06 % nasal spray 1 spray into each nostril 3 (three) times a day   • MODAFINIL PO Take 50 mg by mouth in the morning   • mupirocin (BACTROBAN) 2 % ointment Apply topically daily Applied to the left nostril daily at bedtime (Patient not taking: Reported on 3/10/2025)     Allergies   Allergen Reactions   • Chlorhexidine Hives and Rash     Immunization History   Administered Date(s) Administered   • COVID-19 MODERNA VACC 0.5 ML IM 01/11/2021, 02/08/2021, 11/01/2021, 05/06/2022   • COVID-19 Pfizer Vac BIVALENT [place]-sucrose 12 Yr+ IM 10/13/2022   • COVID-19 Pfizer mRNA vacc PF [place]-sucrose 12 yr and older (Comirnaty) 09/27/2023, 09/26/2024   • H1N1, All Formulations 01/19/2010   • Hep A, adult 03/06/2014   • INFLUENZA 10/09/2014, 11/25/2016, 10/05/2018, 10/30/2019, 10/10/2020, 10/01/2021, 10/18/2021, 09/21/2022, 10/08/2023, 10/24/2023   • Influenza Split High Dose Preservative Free IM 10/29/2015   • Influenza, high dose seasonal 0.7 mL 10/30/2019, 09/21/2022   • Pneumococcal Conjugate 13-Valent 09/12/2017, 12/12/2019   • Pneumococcal Polysaccharide PPV23 01/19/2010, 02/29/2016, 04/08/2016   • Tdap 04/05/2022   • Zoster 01/25/2010   • Zoster Vaccine Recombinant 06/01/2018, 06/14/2018, 08/10/2018, 08/27/2018, 08/27/2018   • influenza, trivalent, adjuvanted 09/26/2024     Objective   /70 (BP Location: Left arm, Patient Position: Sitting, Cuff Size: Standard)   Pulse 66   Temp 98.2 °F (36.8 °C) (Temporal)   Resp 18   Ht 5' 8\" (1.727 m)   Wt 67.1 kg (148 lb)   SpO2 98%   BMI 22.50 " kg/m²     Physical Exam  Vitals and nursing note reviewed.   Constitutional:       General: He is not in acute distress.     Appearance: He is well-developed.   HENT:      Head: Normocephalic and atraumatic.   Eyes:      Conjunctiva/sclera: Conjunctivae normal.   Cardiovascular:      Rate and Rhythm: Normal rate and regular rhythm.      Heart sounds: No murmur heard.  Pulmonary:      Effort: Pulmonary effort is normal. No respiratory distress.      Breath sounds: Normal breath sounds.   Abdominal:      Palpations: Abdomen is soft.      Tenderness: There is no abdominal tenderness.   Musculoskeletal:         General: No swelling.      Cervical back: Neck supple.   Skin:     General: Skin is warm and dry.      Capillary Refill: Capillary refill takes less than 2 seconds.   Neurological:      Mental Status: He is alert.   Psychiatric:         Mood and Affect: Mood normal.

## 2025-03-19 ENCOUNTER — REMOTE DEVICE CLINIC VISIT (OUTPATIENT)
Dept: CARDIOLOGY CLINIC | Facility: CLINIC | Age: 80
End: 2025-03-19
Payer: MEDICARE

## 2025-03-19 DIAGNOSIS — Z95.0 PACEMAKER: Primary | ICD-10-CM

## 2025-03-19 PROCEDURE — 93296 REM INTERROG EVL PM/IDS: CPT | Performed by: STUDENT IN AN ORGANIZED HEALTH CARE EDUCATION/TRAINING PROGRAM

## 2025-03-19 PROCEDURE — 93294 REM INTERROG EVL PM/LDLS PM: CPT | Performed by: STUDENT IN AN ORGANIZED HEALTH CARE EDUCATION/TRAINING PROGRAM

## 2025-03-19 NOTE — PROGRESS NOTES
Results for orders placed or performed in visit on 03/19/25   Cardiac EP device report    Narrative    SJM DC PM (DDD MODE) - NOT MRI CONDITIONAL  MERLIN TRANSMISSION:  BATTERY VOLTAGE ADEQUATE (6.2 -8.9 YR.).  AP 27%  <1% (DDD 50 PPM, -200 MS).  ALL AVAILABLE LEAD PARAMETERS WITHIN NORMAL LIMITS. 2 VHR, 1 AMS, 104 NOISE REVERSION EPISODES.  EGMS SHOW BRIEF AT AND NOISE OVERSENSED PRIMARILY ON THE VENTRICULAR CHANNEL (PROGRAMMED UNIPOLAR, KNOWN HX OF BIPOLAR IMP. > 3000 OHMS).  NORMAL DEVICE FUNCTION.   ES

## 2025-03-20 ENCOUNTER — RESULTS FOLLOW-UP (OUTPATIENT)
Dept: NON INVASIVE DIAGNOSTICS | Facility: HOSPITAL | Age: 80
End: 2025-03-20

## 2025-06-18 ENCOUNTER — REMOTE DEVICE CLINIC VISIT (OUTPATIENT)
Dept: CARDIOLOGY CLINIC | Facility: CLINIC | Age: 80
End: 2025-06-18
Payer: MEDICARE

## 2025-06-18 ENCOUNTER — RESULTS FOLLOW-UP (OUTPATIENT)
Dept: CARDIOLOGY CLINIC | Facility: CLINIC | Age: 80
End: 2025-06-18

## 2025-06-18 DIAGNOSIS — I49.5 SSS (SICK SINUS SYNDROME) (HCC): Primary | ICD-10-CM

## 2025-06-18 PROCEDURE — 93296 REM INTERROG EVL PM/IDS: CPT | Performed by: STUDENT IN AN ORGANIZED HEALTH CARE EDUCATION/TRAINING PROGRAM

## 2025-06-18 PROCEDURE — 93294 REM INTERROG EVL PM/LDLS PM: CPT | Performed by: STUDENT IN AN ORGANIZED HEALTH CARE EDUCATION/TRAINING PROGRAM

## 2025-06-18 NOTE — PROGRESS NOTES
Results for orders placed or performed in visit on 06/18/25   Cardiac EP device report    Narrative    SJM DC PM (DDD MODE) - NOT MRI CONDITIONAL  MERLIN TRANSMISSION:  BATTERY VOLTAGE ADEQUATE (5.9 YRS.).  AP: 30%. : 1.1%.  ALL AVAILABLE LEAD PARAMETERS WITHIN NORMAL LIMITS. 2 VHR EPISODES W/ EGM SHOWING FFRVOS (HX OF SAME).  6 AMS EPISODES W/ AVAIL EGMS SHOWING PAT, MAX DURATION 8SECS. AF BURDEN: <1%.  108 NOISE REVERSION EPISODES W/ AVAIL EGMS SHOWING  BRIEF NOISE ON BOTH RA & RV LEADS (PROGRAMMED UNIPOLAR, KNOWN HX OF BIPOLAR IMP. > 3000 OHMS).  NORMAL DEVICE FUNCTION. CH

## (undated) DEVICE — THE SPACEOAR SYSTEM CONSISTS OF COMPONENTS FOR PREPARATION OF A SYNTHETIC, ABSORBABLE HYDROGEL SPACER AND A DELIVERY SYSTEM PACKAGED FOR SINGLE USE.: Brand: SPACEOAR SYSTEM

## (undated) DEVICE — PAD GROUNDING ADULT

## (undated) DEVICE — SUT MONOCRYL 4-0 PS-2 27 IN Y426H

## (undated) DEVICE — VIAL DECANTER

## (undated) DEVICE — SYRINGE CATH TIP 50ML

## (undated) DEVICE — NEEDLE 22 G X 1 1/2 SAFETY

## (undated) DEVICE — PLUMEPEN PRO 10FT

## (undated) DEVICE — LIGHT HANDLE COVER SLEEVE DISP BLUE STELLAR

## (undated) DEVICE — ADHESIVE SKIN HIGH VISCOSITY EXOFIN 1ML

## (undated) DEVICE — TOWEL SURG XR DETECT GREEN STRL RFD

## (undated) DEVICE — INTENDED FOR TISSUE SEPARATION, AND OTHER PROCEDURES THAT REQUIRE A SHARP SURGICAL BLADE TO PUNCTURE OR CUT.: Brand: BARD-PARKER SAFETY BLADES SIZE 11, STERILE

## (undated) DEVICE — 2963 MEDIPORE SOFT CLOTH TAPE 3 IN X 10 YD 12 RLS/CS: Brand: 3M™ MEDIPORE™

## (undated) DEVICE — DRAPE EQUIPMENT RF WAND

## (undated) DEVICE — HEAVY DUTY TABLE COVER: Brand: CONVERTORS

## (undated) DEVICE — PENCIL ELECTROSURG E-Z CLEAN -0035H

## (undated) DEVICE — SPONGE LAP 18 X 18 IN

## (undated) DEVICE — CHLORAPREP HI-LITE 26ML ORANGE

## (undated) DEVICE — GLOVE SRG BIOGEL 7.5

## (undated) DEVICE — [HIGH FLOW INSUFFLATOR,  DO NOT USE IF PACKAGE IS DAMAGED,  KEEP DRY,  KEEP AWAY FROM SUNLIGHT,  PROTECT FROM HEAT AND RADIOACTIVE SOURCES.]: Brand: PNEUMOSURE

## (undated) DEVICE — INTENDED FOR TISSUE SEPARATION, AND OTHER PROCEDURES THAT REQUIRE A SHARP SURGICAL BLADE TO PUNCTURE OR CUT.: Brand: BARD-PARKER SAFETY BLADES SIZE 15, STERILE

## (undated) DEVICE — SUT VICRYL 3-0 SH 27 IN J416H

## (undated) DEVICE — PMI DISPOSABLE PUNCTURE CLOSURE DEVICE / SUTURE GRASPER: Brand: PMI

## (undated) DEVICE — GLOVE INDICATOR PI UNDERGLOVE SZ 6.5 BLUE

## (undated) DEVICE — DRESSING MEPILEX AG BORDER POST-OP 4 X 6 IN

## (undated) DEVICE — SYRINGE 10ML LL

## (undated) DEVICE — UNDYED BRAIDED (POLYGLACTIN 910), SYNTHETIC ABSORBABLE SUTURE: Brand: COATED VICRYL

## (undated) DEVICE — 3M™ STERI-STRIP™ REINFORCED ADHESIVE SKIN CLOSURES, R1547, 1/2 IN X 4 IN (12 MM X 100 MM), 6 STRIPS/ENVELOPE: Brand: 3M™ STERI-STRIP™

## (undated) DEVICE — GLOVE SRG BIOGEL ORTHOPEDIC 8

## (undated) DEVICE — 3M™ STERI-STRIP™ REINFORCED ADHESIVE SKIN CLOSURES, R1542, 1/4 IN X 1-1/2 IN (6 MM X 38 MM), 6 STRIPS/ENVELOPE: Brand: 3M™ STERI-STRIP™

## (undated) DEVICE — ALLENTOWN LAP CHOLE APP PACK: Brand: CARDINAL HEALTH

## (undated) DEVICE — SUT GORTEX CV-2 THX-26 2N05B

## (undated) DEVICE — NEEDLE SPINAL 22G X 3.5IN  QUINCKE

## (undated) DEVICE — SUT PROLENE 0 CT-2 30 IN 8412H

## (undated) DEVICE — SUT VICRYL 2-0 SH 27 IN UNDYED J417H

## (undated) DEVICE — ADHESIVE SKIN HIGH VISCOSITY EXOFIN MICRO 0.5ML

## (undated) DEVICE — BETHLEHEM UNIVERSAL MINOR GEN: Brand: CARDINAL HEALTH

## (undated) DEVICE — GLOVE SRG BIOGEL 6.5

## (undated) DEVICE — SPONGE STICK WITH PVP-I: Brand: KENDALL

## (undated) DEVICE — IV FLUSH NSS 10ML POSIFLUSH

## (undated) DEVICE — SKIN MARKER DUAL TIP WITH RULER CAP, FLEXIBLE RULER AND LABELS: Brand: DEVON

## (undated) DEVICE — SCD SEQUENTIAL COMPRESSION COMFORT SLEEVE MEDIUM KNEE LENGTH: Brand: KENDALL SCD

## (undated) DEVICE — 3M™ IOBAN™ 2 ANTIMICROBIAL INCISE DRAPE 6648EZ: Brand: IOBAN™ 2

## (undated) DEVICE — PLASMABLADE PS200-040 4.0: Brand: PLASMABLADE™

## (undated) DEVICE — SUT PDS II 1 CT-1 27 IN Z347H

## (undated) DEVICE — TUBING SMOKE EVAC W/FILTRATION DEVICE PLUMEPORT ACTIV

## (undated) DEVICE — BULB SYRINGE,IRRIGATION WITH PROTECTIVE CAP: Brand: DOVER